# Patient Record
Sex: FEMALE | Race: WHITE | NOT HISPANIC OR LATINO | Employment: OTHER | URBAN - METROPOLITAN AREA
[De-identification: names, ages, dates, MRNs, and addresses within clinical notes are randomized per-mention and may not be internally consistent; named-entity substitution may affect disease eponyms.]

---

## 2017-01-09 ENCOUNTER — ALLSCRIPTS OFFICE VISIT (OUTPATIENT)
Dept: OTHER | Facility: OTHER | Age: 77
End: 2017-01-09

## 2017-04-17 ENCOUNTER — ALLSCRIPTS OFFICE VISIT (OUTPATIENT)
Dept: OTHER | Facility: OTHER | Age: 77
End: 2017-04-17

## 2017-05-08 ENCOUNTER — LAB REQUISITION (OUTPATIENT)
Dept: LAB | Facility: HOSPITAL | Age: 77
End: 2017-05-08
Payer: MEDICARE

## 2017-05-08 ENCOUNTER — ALLSCRIPTS OFFICE VISIT (OUTPATIENT)
Dept: OTHER | Facility: OTHER | Age: 77
End: 2017-05-08

## 2017-05-08 DIAGNOSIS — R30.0 DYSURIA: ICD-10-CM

## 2017-05-08 LAB
BILIRUB UR QL STRIP: NORMAL
CLARITY UR: NORMAL
COLOR UR: YELLOW
GLUCOSE (HISTORICAL): NORMAL
HGB UR QL STRIP.AUTO: 50
KETONES UR STRIP-MCNC: NORMAL MG/DL
LEUKOCYTE ESTERASE UR QL STRIP: 500
NITRITE UR QL STRIP: NORMAL
PH UR STRIP.AUTO: 5 [PH]
PROT UR STRIP-MCNC: NORMAL MG/DL
SP GR UR STRIP.AUTO: 1.02
UROBILINOGEN UR QL STRIP.AUTO: NORMAL

## 2017-05-08 PROCEDURE — 87186 SC STD MICRODIL/AGAR DIL: CPT | Performed by: FAMILY MEDICINE

## 2017-05-08 PROCEDURE — 87086 URINE CULTURE/COLONY COUNT: CPT | Performed by: FAMILY MEDICINE

## 2017-05-08 PROCEDURE — 87077 CULTURE AEROBIC IDENTIFY: CPT | Performed by: FAMILY MEDICINE

## 2017-05-10 LAB — BACTERIA UR CULT: NORMAL

## 2017-05-18 ENCOUNTER — GENERIC CONVERSION - ENCOUNTER (OUTPATIENT)
Dept: OTHER | Facility: OTHER | Age: 77
End: 2017-05-18

## 2017-07-24 ENCOUNTER — TRANSCRIBE ORDERS (OUTPATIENT)
Dept: ADMINISTRATIVE | Facility: HOSPITAL | Age: 77
End: 2017-07-24

## 2017-07-24 DIAGNOSIS — Z12.31 ENCOUNTER FOR MAMMOGRAM TO ESTABLISH BASELINE MAMMOGRAM: Primary | ICD-10-CM

## 2017-07-31 ENCOUNTER — HOSPITAL ENCOUNTER (OUTPATIENT)
Dept: RADIOLOGY | Facility: HOSPITAL | Age: 77
Discharge: HOME | End: 2017-07-31
Attending: OBSTETRICS & GYNECOLOGY
Payer: MEDICARE

## 2017-07-31 DIAGNOSIS — Z12.31 ENCOUNTER FOR MAMMOGRAM TO ESTABLISH BASELINE MAMMOGRAM: ICD-10-CM

## 2017-07-31 PROCEDURE — G0202 SCR MAMMO BI INCL CAD: HCPCS

## 2017-07-31 PROCEDURE — 77063 BREAST TOMOSYNTHESIS BI: CPT

## 2017-08-18 ENCOUNTER — TRANSCRIBE ORDERS (OUTPATIENT)
Dept: ADMINISTRATIVE | Facility: HOSPITAL | Age: 77
End: 2017-08-18

## 2017-08-18 DIAGNOSIS — R10.2 PELVIC PAIN IN FEMALE: Primary | ICD-10-CM

## 2017-08-28 ENCOUNTER — HOSPITAL ENCOUNTER (OUTPATIENT)
Dept: RADIOLOGY | Facility: HOSPITAL | Age: 77
Discharge: HOME | End: 2017-08-28
Attending: OBSTETRICS & GYNECOLOGY
Payer: MEDICARE

## 2017-08-28 DIAGNOSIS — R10.2 PELVIC PAIN IN FEMALE: ICD-10-CM

## 2017-08-28 PROCEDURE — 76830 TRANSVAGINAL US NON-OB: CPT

## 2017-08-28 PROCEDURE — 76856 US EXAM PELVIC COMPLETE: CPT

## 2017-10-16 ENCOUNTER — GENERIC CONVERSION - ENCOUNTER (OUTPATIENT)
Dept: OTHER | Facility: OTHER | Age: 77
End: 2017-10-16

## 2017-10-16 DIAGNOSIS — R06.09 OTHER FORMS OF DYSPNEA: ICD-10-CM

## 2017-10-16 DIAGNOSIS — R00.2 PALPITATIONS: ICD-10-CM

## 2017-10-16 DIAGNOSIS — R10.30 LOWER ABDOMINAL PAIN: ICD-10-CM

## 2017-10-18 ENCOUNTER — GENERIC CONVERSION - ENCOUNTER (OUTPATIENT)
Dept: OTHER | Facility: OTHER | Age: 77
End: 2017-10-18

## 2017-10-18 ENCOUNTER — ALLSCRIPTS OFFICE VISIT (OUTPATIENT)
Dept: OTHER | Facility: OTHER | Age: 77
End: 2017-10-18

## 2017-10-18 ENCOUNTER — TRANSCRIBE ORDERS (OUTPATIENT)
Dept: ADMINISTRATIVE | Facility: HOSPITAL | Age: 77
End: 2017-10-18

## 2017-10-18 ENCOUNTER — HOSPITAL ENCOUNTER (OUTPATIENT)
Dept: RADIOLOGY | Facility: HOSPITAL | Age: 77
Discharge: HOME | End: 2017-10-18
Attending: FAMILY MEDICINE
Payer: MEDICARE

## 2017-10-18 DIAGNOSIS — G47.19 TRANSIENT DISORDER OF INITIATING OR MAINTAINING WAKEFULNESS: Primary | ICD-10-CM

## 2017-10-18 DIAGNOSIS — R10.30 LOWER ABDOMINAL PAIN: ICD-10-CM

## 2017-10-18 DIAGNOSIS — R06.00 DOE (DYSPNEA ON EXERTION): ICD-10-CM

## 2017-10-18 PROCEDURE — 73502 X-RAY EXAM HIP UNI 2-3 VIEWS: CPT

## 2017-10-20 ENCOUNTER — GENERIC CONVERSION - ENCOUNTER (OUTPATIENT)
Dept: OTHER | Facility: OTHER | Age: 77
End: 2017-10-20

## 2017-10-24 ENCOUNTER — HOSPITAL ENCOUNTER (OUTPATIENT)
Dept: NON INVASIVE DIAGNOSTICS | Facility: HOSPITAL | Age: 77
Discharge: HOME | End: 2017-10-24
Payer: MEDICARE

## 2017-10-24 DIAGNOSIS — R06.00 DOE (DYSPNEA ON EXERTION): ICD-10-CM

## 2017-10-24 PROCEDURE — 93306 TTE W/DOPPLER COMPLETE: CPT

## 2017-10-26 ENCOUNTER — ALLSCRIPTS OFFICE VISIT (OUTPATIENT)
Dept: OTHER | Facility: OTHER | Age: 77
End: 2017-10-26

## 2017-10-27 ENCOUNTER — HOSPITAL ENCOUNTER (OUTPATIENT)
Dept: SLEEP CENTER | Facility: CLINIC | Age: 77
Discharge: HOME | End: 2017-10-27
Payer: MEDICARE

## 2017-10-27 DIAGNOSIS — G47.19 TRANSIENT DISORDER OF INITIATING OR MAINTAINING WAKEFULNESS: ICD-10-CM

## 2017-10-27 PROCEDURE — 95810 POLYSOM 6/> YRS 4/> PARAM: CPT

## 2017-10-28 ENCOUNTER — GENERIC CONVERSION - ENCOUNTER (OUTPATIENT)
Dept: OTHER | Facility: OTHER | Age: 77
End: 2017-10-28

## 2017-10-28 NOTE — CONSULTS
Assessment  1  Dyspnea on exertion (786 09) (R06 09)   2  Palpitations (785 1) (R00 2)   3  Bilateral leg edema (782 3) (R60 0)   4  Nausea (787 02) (R11 0)   5  Benign essential hypertension (401 1) (I10)    Plan  Palpitations    · HOLTER MONITOR - 48 HOUR; Status:Active; Requested VLL:[id number];    Perform:[facility]; NBP:08TUK2461; Last Updated By:Arlet Wiseman; 10/26/2017 2:52:34 PM;Ordered; For:Palpitations; Ordered By:Yo Saab;   · STRESS TEST ONLY, EXERCISE; Status:Active; Requested UEZ:[id number];    Perform:[facility]; YZL:[id number]; Last Updated By:Arlet Wiseman; 10/26/2017 2:52:34 PM;Ordered; For:Palpitations; Ordered By:Yo Saab;    Discussion/Summary    Dyspnea on exertion- unclear etiology  grade 1 diastolic dysfunction? normal EF  Normal PFT  possible component from deconditioning? uncontrolled bp with exertion?exercise treadmill stress  circadium rhythm/fatiguesleep study pending  holter monitor  The patient was counseled regarding diagnostic results,-- instructions for management,-- prognosis,-- patient and family education,-- risks and benefits of treatment options,-- importance of compliance with treatment  Chief Complaint  NP - echo done 10/24/17; breathing problems at night, left-sided chest heaviness  ylm/ma      History of Present Illness  Cardiology HPI Free Text Note Form [facility]: 68 yo with no prior cardiac hx with worsening shortness of breath and fluttering in her chest  Feels severe daytime fatigue  Previously could walk 2 blocks without difficulty  She now has to stop frequently  Some swelling in her legs at the end of day  Has significant arthritis  No recent fevers or chills  No syncope  HxDoctor- salon+ chemical exposuresmokingsignifcant drinkingfatigue + snoring  HxMI- latel 60s      Review of Systems      Cardiac: No complaints of chest pain, no palpitations, no fainting  Skin: No complaints of nonhealing sores or skin rash     Genitourinary: No complaints of recurrent urinary tract infections, frequent urination at night, difficult urination, blood in urine, kidney stones, loss of bladder control, kidney problems, denies any birth control or hormone replacement, is not post menopausal, not currently pregnant  Psychological: No complaints of feeling depressed, anxiety, panic attacks, or difficulty concentrating  General: No complaints of trouble sleeping, lack of energy, fatigue, appetite changes, weight changes, fever, frequent infections, or night sweats  Respiratory: shortness of breath, but-- No complaints of shortness of breath, cough with sputum, or wheezing  HEENT: No complaints of serious problems, hearing problems, nose problems, throat problems, or snoring  Gastrointestinal: No complaints of liver problems, nausea, vomiting, heartburn, constipation, bloody stools, diarrhea, problems swallowing, adbominal pain, or rectal bleeding  Hematologic: No complaints of bleeding disorders, anemia, blood clots, or excessive brusing  Neurological: No complaints of numbness, tingling, dizziness, weakness, seizures, headaches, syncope or fainting, AM fatigue, daytime sleepiness, no witnessed apnea episodes  Musculoskeletal: No complaints of arthritis, back pain, or painfull swelling  ROS reviewed  Active Problems  1  Abrasion On The Chest (911 0)   2  Acute bronchitis with bronchospasm (466 0) (J20 9)   3  Acute sinusitis (461 9) (J01 90)   4  Acute upper respiratory infection (465 9) (J06 9)   5  Allergic rhinitis (477 9) (J30 9)   6  Arm bruise (923 9) (S40 029A)   7  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)   8  Benign essential hypertension (401 1) (I10)   9  Bilateral leg edema (782 3) (R60 0)   10  BMI 35 0-35 9,adult (V85 35) (Z68 35)   11  BMI 36 0-36 9,adult (V85 36) (Z68 36)   12  Cataract (366 9) (H26 9)   13  Colon, diverticulosis (562 10) (K57 30)   14  Colonoscopy (Fiberoptic) Screening   15   Costochondritis (733 6) (M94 0) 16  Cough (786 2) (R05)   17  Daytime hypersomnolence (780 54) (G47 19)   18  Depression screen (V79 0) (Z13 89)   19  Diarrhea (787 91) (R19 7)   20  Dyspnea on exertion (786 09) (R06 09)   21  Dysuria (788 1) (R30 0)   22  Exposure to herpes (V01 79) (Z20 828)   23  Fear Of Possible Disease (V65 5)   24  Feeling weak (780 79) (R53 1)   25  Foreign body granuloma of skin and subcutaneous tissue (709 4) (L92 3)   26  Fracture of humerus (812 20) (S42 309A)   27  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) (M15 9)   28  Influenza vaccination declined (V64 06) (Z28 21)   29  Left buttock pain (729 1) (M79 1)   30  Nausea (787 02) (R11 0)   31  Overweight (278 02) (E66 3)   32  Preop examination (V72 84) (Z01 818)   33  Right foot pain (729 5) (M79 671)   34  Right-sided chest wall pain (786 52) (R07 89)   35  Screening for cardiovascular condition (V81 2) (Z13 6)   36  Screening for diabetes mellitus (V77 1) (Z13 1)   37  Screening for genitourinary condition (V81 6) (Z13 89)   38  Screening for thyroid disorder (V77 0) (Z13 29)   39  Severe lt groin pain (789 09) (R10 30)   40  Sleep disorder breathing (780 59) (G47 30)   41  Upper back pain on right side (724 5) (M54 9)   42  Urgency of urination (788 63) (R39 15)   43  Urinary tract infection (599 0) (N39 0)   44  UTI (urinary tract infection) (599 0) (N39 0)   45   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 (454 2) (I83 229,I83 219)    Past Medical History   · Acute bronchitis (466 0) (J20 9)   · History of Anxiety (300 00) (F41 9)   · History of Bleeding from varicose vein (454 8) (I83 899)   · History of Environmental allergies (V15 09) (Z91 09)   · History of abdominal pain (V13 89) (Z43 082)   · History of fracture of radius (V15 51) (Z87 81)   · History of obesity (V13 89) (Z86 39)   · History of scoliosis (V13 59) (Z87 39)   · History of Normal ECG   · History of Rash (782 1) (R21)   · History of Screening for genitourinary condition (V81 6) (Z13 89)   · History of Vaginitis and vulvovaginitis (616 10) (N76 0)    The active problems and past medical history were reviewed and updated today  Surgical History   · History of Cataract Surgery    The surgical history was reviewed and updated today  Family History  Mother    · No pertinent family history  Family History Reviewed: The family history was reviewed and updated today  Social History   · Being A Social Drinker   · Never A Smoker  The social history was reviewed and updated today  Current Meds   1  Aleve 220 MG Oral Tablet; take 1 tablet as needed; Therapy: (Recorded:08Jun2015) to Recorded   2  B Complex Vitamins CAPS; TAKE 1 CAPSULE Daily Recorded   3  Ciprofloxacin HCl - 500 MG Oral Tablet; TAKE 1 TABLET EVERY 12 HOURS DAILY; Therapy: 41FYY4180 to (Evaluate:19Tpe3860)  Requested for: 96DOA2597; Last   OQ:03NNV9707 Ordered   4  Fluticasone Propionate 50 MCG/ACT Nasal Suspension; USE 2 SPRAYS IN EACH   NOSTRIL DAILY  Requested for: 03XDH1124; Last Rx:74Kzp1155 Ordered   5  Nystatin-Triamcinolone 893884-1 0 UNIT/GM-% External Cream;   Therapy: (Recorded:26Sep2016) to Recorded   6  ZyrTEC Allergy 10 MG Oral Capsule; 1 Cap daily Recorded    The medication list was reviewed and updated today  Allergies  1  Penicillins   2  Sulfa Drugs    Vitals  Signs   Heart Rate: 68, L Radial  Systolic: 796, LUE, Sitting  Diastolic: 72, LUE, Sitting  Height: 5 ft 1 in  Weight: 206 lb   BMI Calculated: 38 92  BSA Calculated: 1 91  O2 Saturation: 99, RA    Physical Exam    Constitutional   General appearance: No acute distress, well appearing and well nourished  Eyes   Conjunctiva and Sclera examination: Conjunctiva pink, sclera anicteric  Ears, Nose, Mouth, and Throat - Oropharynx: Abnormal    Neck   Neck and thyroid: Normal, supple, trachea midline, no thyromegaly  Pulmonary   Respiratory effort: No increased work of breathing or signs of respiratory distress  Auscultation of lungs: Clear to auscultation, no rales, no rhonchi, no wheezing, good air movement  Cardiovascular rrr +s1 +s2 no murmur  Carotid pulses: Normal, 2+ bilaterally  Peripheral vascular exam: Normal pulses throughout, no tenderness, erythema or swelling  Pedal pulses: Normal, 2+ bilaterally  Examination of extremities for edema and/or varicosities: Abnormal     Abdomen   Abdomen: Non-tender and no distention  Liver and spleen: No hepatomegaly or splenomegaly  Musculoskeletal Gait and station: Normal gait  -- Digits and nails: Normal without clubbing or cyanosis  -- Inspection/palpation of joints, bones, and muscles: Normal, ROM normal     Skin - Skin and subcutaneous tissue: Normal without rashes or lesions  Skin is warm and well perfused, normal turgor  Neurologic - Cranial nerves: II - XII intact  -- Speech: Normal     Psychiatric - Orientation to person, place, and time: Normal -- Mood and affect: Normal       Results/Data  I personally reviewed the recording/images in the office today  My interpretation follows  normal sinus rhythm no acute st/t wave changes      Future Appointments    Date/Time Provider Specialty Site   01/22/2018 01:00 PM ORVILLE Nicole  [address] [place] [address]   12/04/2017 11:40 AM ORVILLE Harris  Cardiology [facility] CARDIOLOGY [facility]     End of Encounter Meds  1  Fluticasone Propionate 50 MCG/ACT Nasal Suspension; USE 2 SPRAYS IN EACH   NOSTRIL DAILY  Requested for: 99DSQ4554; Last Rx:29Lpm8788 Ordered  2  Ciprofloxacin HCl - 500 MG Oral Tablet; TAKE 1 TABLET EVERY 12 HOURS DAILY; Therapy: 51ABT9729 to (Evaluate:81Glr1720)  Requested for: 43NZT4721; Last   JW:[id number] Ordered  3  Aleve 220 MG Oral Tablet; take 1 tablet as needed; Therapy: (Recorded:08Jun2015) to Recorded   4  B Complex Vitamins CAPS; TAKE 1 CAPSULE Daily Recorded   5   Nystatin-Triamcinolone 287238-0 1 UNIT/GM-% External Cream;   Therapy: (Recorded:86Buq2363) to Recorded   6   ZyrTEC Allergy 10 MG Oral Capsule; 1 Cap daily Recorded    Signatures   Electronically signed by : ORVILLE Sue ; Oct 27 2017 10:06AM EST                       (Author)

## 2017-11-06 ENCOUNTER — HOSPITAL ENCOUNTER (OUTPATIENT)
Dept: NON INVASIVE DIAGNOSTICS | Facility: HOSPITAL | Age: 77
Discharge: HOME | End: 2017-11-06
Attending: INTERNAL MEDICINE
Payer: MEDICARE

## 2017-11-06 ENCOUNTER — GENERIC CONVERSION - ENCOUNTER (OUTPATIENT)
Dept: OTHER | Facility: OTHER | Age: 77
End: 2017-11-06

## 2017-11-06 DIAGNOSIS — R00.2 PALPITATIONS: ICD-10-CM

## 2017-11-06 PROCEDURE — 93226 XTRNL ECG REC<48 HR SCAN A/R: CPT

## 2017-11-06 PROCEDURE — 93225 XTRNL ECG REC<48 HRS REC: CPT

## 2017-11-06 PROCEDURE — 93017 CV STRESS TEST TRACING ONLY: CPT

## 2017-11-06 RX ORDER — COVID-19 ANTIGEN TEST
1 KIT MISCELLANEOUS DAILY
COMMUNITY

## 2017-11-08 ENCOUNTER — TRANSCRIBE ORDERS (OUTPATIENT)
Dept: ADMINISTRATIVE | Facility: HOSPITAL | Age: 77
End: 2017-11-08

## 2017-11-08 DIAGNOSIS — G47.33 OBSTRUCTIVE SLEEP APNEA SYNDROME: Primary | ICD-10-CM

## 2017-11-16 ENCOUNTER — HOSPITAL ENCOUNTER (OUTPATIENT)
Dept: SLEEP CENTER | Facility: CLINIC | Age: 77
Discharge: HOME | End: 2017-11-16
Payer: MEDICARE

## 2017-11-16 DIAGNOSIS — G47.33 OBSTRUCTIVE SLEEP APNEA SYNDROME: ICD-10-CM

## 2017-11-16 PROCEDURE — 95811 POLYSOM 6/>YRS CPAP 4/> PARM: CPT

## 2017-11-17 LAB
CHEST PAIN STATEMENT: NORMAL
MAX DIASTOLIC BP: 82 MMHG
MAX HEART RATE: 150 BPM
MAX PREDICTED HEART RATE: 144 BPM
MAX. SYSTOLIC BP: 200 MMHG
PROTOCOL NAME: NORMAL
TARGET HR FORMULA: NORMAL
TEST INDICATION: NORMAL
TIME IN EXERCISE PHASE: NORMAL

## 2017-12-01 ENCOUNTER — GENERIC CONVERSION - ENCOUNTER (OUTPATIENT)
Dept: OTHER | Facility: OTHER | Age: 77
End: 2017-12-01

## 2017-12-04 ENCOUNTER — ALLSCRIPTS OFFICE VISIT (OUTPATIENT)
Dept: OTHER | Facility: OTHER | Age: 77
End: 2017-12-04

## 2017-12-04 ENCOUNTER — TRANSCRIBE ORDERS (OUTPATIENT)
Dept: ADMINISTRATIVE | Facility: HOSPITAL | Age: 77
End: 2017-12-04

## 2017-12-04 DIAGNOSIS — R94.39 ABNORMAL STRESS TEST: Primary | ICD-10-CM

## 2017-12-04 DIAGNOSIS — I65.23 OCCLUSION AND STENOSIS OF BILATERAL CAROTID ARTERIES: ICD-10-CM

## 2017-12-06 NOTE — PROGRESS NOTES
Assessment  Assessed    1  Dyspnea on exertion (786 09) (R06 09)   2  Bilateral carotid artery stenosis (433 10,433 30) (I65 23)   3  Abnormal stress ECG with treadmill (794 39) (R94 39)    Plan  Abnormal stress ECG with treadmill, Dyspnea on exertion, Right-sided chest wall pain    · * NM MYOCARDIAL PERFUSION SPECT (STRESS AND/OR REST); Status:NeedInformation - Financial Authorization; Requested for:74Rxk4564;    Perform: [facility]; VSL:66PSF1574; Last Updated By:Brenda Marquez; 12/4/2017 2:52:46 PM;Ordered;stress ECG with treadmill, Dyspnea on exertion, Right-sided chest wall pain; Ordered By:Yo Saab;  Bilateral carotid artery stenosis    · VAS CAROTID COMPLETE STUDY; SIDE:Bilateral; Status:Active; Requestedfor:18Mwa8045;    Perform:[facility]; ALEX:45WVN5307; Last Updated By:Bradley Marquez; 12/4/2017 2:52:46 PM;Ordered;carotid artery stenosis; Ordered By:Yo Saab;    Discussion/Summary  Cardiology Discussion Summary Free Text Note Form [facility]:   Dyspnea on exertion- unclear etiology  grade 1 diastolic dysfunction? normal EF  Normal PFT  possible component from deconditioning? uncontrolled bp with exertion?lexiscan nuclear stress  circadium rhythm/fatiguesleep study pending  holter monitor  artery diseasecarotid u/s  Counseling Documentation With Imm: The patient was counseled regarding diagnostic results,-- instructions for management,-- prognosis,-- patient and family education,-- risks and benefits of treatment options,-- importance of compliance with treatment  Chief Complaint  Chief Complaint Free Text Note Form: PT is here for f/u after stress test  No cardiac complaint at the time of visit  History of Present Illness  Cardiology HPI Free Text Note Form [facility]: 68 yo with no prior cardiac hx with worsening shortness of breath and fluttering in her chest  Feels severe daytime fatigue  Previously could walk 2 blocks without difficulty   She now has to stop frequently  Some swelling in her legs at the end of day  Has significant arthritis  No recent fevers or chills  No syncope  She was unable to reach target on the exercise treadmill  She was also noted to have elevated blood pressure  She does not exercise frequently  She denies having any dizziness or lightheadedness  We reviewed through her studies and blood work  HxDoctor- salon+ chemical exposuresmokingsignifcant drinkingfatigue + snoring  HxMI- latel 60s      Review of Systems  Cardiology Female ROS:    Cardiac: No complaints of chest pain, no palpitations, no fainting  Skin: No complaints of nonhealing sores or skin rash  Genitourinary: No complaints of recurrent urinary tract infections, frequent urination at night, difficult urination, blood in urine, kidney stones, loss of bladder control, kidney problems, denies any birth control or hormone replacement, is not post menopausal, not currently pregnant  Psychological: No complaints of feeling depressed, anxiety, panic attacks, or difficulty concentrating  General: No complaints of trouble sleeping, lack of energy, fatigue, appetite changes, weight changes, fever, frequent infections, or night sweats  Respiratory: shortness of breath, but-- No complaints of shortness of breath, cough with sputum, or wheezing  HEENT: No complaints of serious problems, hearing problems, nose problems, throat problems, or snoring  Gastrointestinal: No complaints of liver problems, nausea, vomiting, heartburn, constipation, bloody stools, diarrhea, problems swallowing, adbominal pain, or rectal bleeding  Hematologic: No complaints of bleeding disorders, anemia, blood clots, or excessive brusing  Neurological: No complaints of numbness, tingling, dizziness, weakness, seizures, headaches, syncope or fainting, AM fatigue, daytime sleepiness, no witnessed apnea episodes  Musculoskeletal: No complaints of arthritis, back pain, or painfull swelling     ROS Reviewed: ROS reviewed  Active Problems  Problems    1  Abrasion On The Chest (911 0)   2  Acute bronchitis with bronchospasm (466 0) (J20 9)   3  Acute sinusitis (461 9) (J01 90)   4  Acute upper respiratory infection (465 9) (J06 9)   5  Allergic rhinitis (477 9) (J30 9)   6  Arm bruise (923 9) (S40 029A)   7  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)   8  Benign essential hypertension (401 1) (I10)   9  Bilateral leg edema (782 3) (R60 0)   10  BMI 35 0-35 9,adult (V85 35) (Z68 35)   11  BMI 36 0-36 9,adult (V85 36) (Z68 36)   12  Cataract (366 9) (H26 9)   13  Colon, diverticulosis (562 10) (K57 30)   14  Colonoscopy (Fiberoptic) Screening   15  Costochondritis (733 6) (M94 0)   16  Cough (786 2) (R05)   17  Daytime hypersomnolence (780 54) (G47 19)   18  Depression screen (V79 0) (Z13 89)   19  Diarrhea (787 91) (R19 7)   20  Dyspnea on exertion (786 09) (R06 09)   21  Dysuria (788 1) (R30 0)   22  Exposure to herpes (V01 79) (Z20 828)   23  Fear Of Possible Disease (V65 5)   24  Feeling weak (780 79) (R53 1)   25  Foreign body granuloma of skin and subcutaneous tissue (709 4) (L92 3)   26  Fracture of humerus (812 20) (S42 309A)   27  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) (M15 9)   28  Influenza vaccination declined (V64 06) (Z28 21)   29  Left buttock pain (729 1) (M79 1)   30  Nausea (787 02) (R11 0)   31  Obstructive sleep apnea (327 23) (G47 33)   32  Overweight (278 02) (E66 3)   33  Palpitations (785 1) (R00 2)   34  Preop examination (V72 84) (Z01 818)   35  Right foot pain (729 5) (M79 671)   36  Right-sided chest wall pain (786 52) (R07 89)   37  Screening for cardiovascular condition (V81 2) (Z13 6)   38  Screening for diabetes mellitus (V77 1) (Z13 1)   39  Screening for genitourinary condition (V81 6) (Z13 89)   40  Screening for thyroid disorder (V77 0) (Z13 29)   41  Severe lt groin pain (789 09) (R10 30)   42   Sleep disorder breathing (780 59) (G47 30)   43  Upper back pain on right side (724 5) (M54 9)   44  Urgency of urination (788 63) (R39 15)   45  Urinary tract infection (599 0) (N39 0)   46  UTI (urinary tract infection) (599 0) (N39 0)   47  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 (454 2) (I83 229,I83 219)    Past Medical History  Problems    1  Acute bronchitis (466 0) (J20 9)   2  History of Anxiety (300 00) (F41 9)   3  History of Bleeding from varicose vein (454 8) (I83 899)   4  History of Environmental allergies (V15 09) (Z91 09)   5  History of abdominal pain (V13 89) (Z87 898)   6  History of fracture of radius (V15 51) (Z87 81)   7  History of obesity (V12 29) (Z86 39)   8  History of scoliosis (V13 59) (Z87 39)   9  History of Normal ECG   10  History of Rash (782 1) (R21)   11  History of Screening for genitourinary condition (V81 6) (Z13 89)   12  History of Vaginitis and vulvovaginitis (616 10) (N76 0)  Active Problems And Past Medical History Reviewed: The active problems and past medical history were reviewed and updated today  Surgical History  Problems    1  History of Cataract Surgery  Surgical History Reviewed: The surgical history was reviewed and updated today  Family History  Mother    1  No pertinent family history  Family History Reviewed: The family history was reviewed and updated today  Social History  Problems    · Being A Social Drinker   · Never A Smoker  Social History Reviewed: The social history was reviewed and updated today  Current Meds   1  Aleve 220 MG Oral Tablet; take 1 tablet as needed; Therapy: (Recorded:08Jun2015) to Recorded   2  B Complex Vitamins CAPS; TAKE 1 CAPSULE Daily Recorded   3  Fluticasone Propionate 50 MCG/ACT Nasal Suspension; USE 2 SPRAYS IN EACH NOSTRIL DAILY  Requested for: 10SPI2286; Last Rx:26Sep2016 Ordered   4  Nystatin-Triamcinolone 531240-3 3 UNIT/GM-% External Cream; Therapy: (Recorded:26Sep2016) to Recorded   5   ZyrTEC Allergy 10 MG Oral Capsule; 1 Cap daily Recorded  Medication List Reviewed: The medication list was reviewed and updated today  Allergies  Medication    1  Penicillins   2  Sulfa Drugs    Vitals  Vital Signs    Recorded: 42ZFB9625 11:47AM   Heart Rate 63, Apical   Systolic 049, LUE, Sitting   Diastolic 78, LUE, Sitting   Height 5 ft 1 in   Weight 204 lb    BMI Calculated 38 55   BSA Calculated 1 91   O2 Saturation 98       Physical Exam   Constitutional  General appearance: No acute distress, well appearing and well nourished  Eyes  Conjunctiva and Sclera examination: Conjunctiva pink, sclera anicteric  Ears, Nose, Mouth, and Throat - Oropharynx: Abnormal   Neck  Neck and thyroid: Normal, supple, trachea midline, no thyromegaly  Pulmonary  Respiratory effort: No increased work of breathing or signs of respiratory distress  Auscultation of lungs: Clear to auscultation, no rales, no rhonchi, no wheezing, good air movement  Cardiovascular rrr +s1 +s2 no murmur  Carotid pulses: Normal, 2+ bilaterally  Peripheral vascular exam: Normal pulses throughout, no tenderness, erythema or swelling  Pedal pulses: Normal, 2+ bilaterally  Examination of extremities for edema and/or varicosities: Abnormal    Abdomen  Abdomen: Non-tender and no distention  Liver and spleen: No hepatomegaly or splenomegaly  Musculoskeletal Gait and station: Normal gait  -- Digits and nails: Normal without clubbing or cyanosis  -- Inspection/palpation of joints, bones, and muscles: Normal, ROM normal    Skin - Skin and subcutaneous tissue: Normal without rashes or lesions  Skin is warm and well perfused, normal turgor  Neurologic - Cranial nerves: II - XII intact  -- Speech: Normal    Psychiatric - Orientation to person, place, and time: Normal -- Mood and affect: Normal       Results/Data  Diagnostic Studies Reviewed Cardio: I personally reviewed the recording/images in the office today  My interpretation follows     ECG Report: normal sinus rhythm no acute st/t wave changes      Health Management  Moeller esophagus   COLONOSCOPY; every 5 years; Last 28Apr2014; Next Due: 14VQB5049; Active    Future Appointments    Date/Time Provider Specialty Site   01/22/2018 01:00 PM ORVILLE Vidales  [address] [place] [address]   01/15/2018 08:00 AM ORVILLE Scott  Cardiology [facility] CARDIOLOGY [facility]   01/19/2018 08:15 AM ORVILLE Horton   Pulmonary Medicine [facility] [address]       Signatures   Electronically signed by : ORVILLE Jj ; Dec  4 2017  5:08PM EST                       (Author)

## 2017-12-27 ENCOUNTER — HOSPITAL ENCOUNTER (OUTPATIENT)
Dept: RADIOLOGY | Facility: HOSPITAL | Age: 77
Discharge: HOME | End: 2017-12-27
Attending: INTERNAL MEDICINE
Payer: MEDICARE

## 2017-12-27 ENCOUNTER — HOSPITAL ENCOUNTER (OUTPATIENT)
Dept: NON INVASIVE DIAGNOSTICS | Facility: HOSPITAL | Age: 77
Discharge: HOME | End: 2017-12-27
Attending: INTERNAL MEDICINE
Payer: MEDICARE

## 2017-12-27 ENCOUNTER — GENERIC CONVERSION - ENCOUNTER (OUTPATIENT)
Dept: CARDIOLOGY CLINIC | Facility: CLINIC | Age: 77
End: 2017-12-27

## 2017-12-27 ENCOUNTER — GENERIC CONVERSION - ENCOUNTER (OUTPATIENT)
Dept: OTHER | Facility: OTHER | Age: 77
End: 2017-12-27

## 2017-12-27 DIAGNOSIS — I65.23 OCCLUSION AND STENOSIS OF BILATERAL CAROTID ARTERIES: ICD-10-CM

## 2017-12-27 DIAGNOSIS — R94.39 ABNORMAL STRESS TEST: ICD-10-CM

## 2017-12-27 PROCEDURE — A9502 TC99M TETROFOSMIN: HCPCS

## 2017-12-27 PROCEDURE — 93017 CV STRESS TEST TRACING ONLY: CPT

## 2017-12-27 PROCEDURE — 93880 EXTRACRANIAL BILAT STUDY: CPT

## 2017-12-27 PROCEDURE — 78452 HT MUSCLE IMAGE SPECT MULT: CPT

## 2017-12-28 LAB
CHEST PAIN STATEMENT: NORMAL
MAX DIASTOLIC BP: 70 MMHG
MAX HEART RATE: 146 BPM
MAX PREDICTED HEART RATE: 144 BPM
MAX. SYSTOLIC BP: 164 MMHG
PROTOCOL NAME: NORMAL
REASON FOR TERMINATION: NORMAL
TARGET HR FORMULA: NORMAL
TIME IN EXERCISE PHASE: NORMAL

## 2018-01-12 VITALS
OXYGEN SATURATION: 97 % | SYSTOLIC BLOOD PRESSURE: 132 MMHG | WEIGHT: 203 LBS | TEMPERATURE: 98.3 F | HEART RATE: 88 BPM | BODY MASS INDEX: 35.97 KG/M2 | HEIGHT: 63 IN | DIASTOLIC BLOOD PRESSURE: 72 MMHG | RESPIRATION RATE: 18 BRPM

## 2018-01-13 VITALS
DIASTOLIC BLOOD PRESSURE: 72 MMHG | HEART RATE: 68 BPM | WEIGHT: 206 LBS | HEIGHT: 61 IN | BODY MASS INDEX: 38.89 KG/M2 | SYSTOLIC BLOOD PRESSURE: 138 MMHG | OXYGEN SATURATION: 99 %

## 2018-01-13 NOTE — PROCEDURES
Procedures by Luis Daniel Cuba MD at 2017  11:59 PM      Author:  Luis Daniel Cuba MD Service:  Cardiology Author Type:  Physician    Filed:  2017 11:11 PM Date of Service:  2017 11:59 PM Status:  Signed    :  Luis Daniel Cuba MD (Physician)         PT NAME: Santhosh Castellano  : 1940  AGE: 69 y o  GENDER: female  MRN: [id number]  PROCEDURE: Holter monitor - 48hour    DATE OF PROCEDURE:      INDICATIONS:   48 hour Holter monitor was performed for palpitations     PROCEDURE:    Average heart rate was 71; minimum heart rate was sinus bradycardia at 51 BPM at 1:45am; and maximum heart rate was sinus tachycardia at 114 BPM at 6:47am      Ventricular ectopic activity consisted of 8, which comprises 0% of total beats  Supraventricular ectopic activity consisted of 608, which comprises 0 3% of total beats  602 were single pac    No significant bradyarrhythmias were present  Patient's diary included symptoms of tired  Corresponding events on monitor was no significant symptoms  IMPRESSION:  1  Normal 48 hour holter tracing report  2  Rhythm was sinus throughout monitoring period  3  There were few pac episodes of Supraventricular ectopic activity  4  There were no episodes of Ventricular ectopic activity  5  Patient's symptoms on diary corresponded to normal sinus rhythm on monitor  Interpreted by: Luis Daniel Cuba MD, Casey RENE    2017 11:11PM [facility] Standard Time

## 2018-01-14 VITALS
TEMPERATURE: 97.2 F | HEIGHT: 63 IN | RESPIRATION RATE: 18 BRPM | OXYGEN SATURATION: 96 % | HEART RATE: 74 BPM | DIASTOLIC BLOOD PRESSURE: 72 MMHG | SYSTOLIC BLOOD PRESSURE: 132 MMHG | WEIGHT: 204 LBS | BODY MASS INDEX: 36.14 KG/M2

## 2018-01-14 VITALS
WEIGHT: 204 LBS | SYSTOLIC BLOOD PRESSURE: 128 MMHG | BODY MASS INDEX: 36.14 KG/M2 | HEART RATE: 68 BPM | HEIGHT: 63 IN | TEMPERATURE: 95.6 F | DIASTOLIC BLOOD PRESSURE: 78 MMHG | OXYGEN SATURATION: 96 % | RESPIRATION RATE: 18 BRPM

## 2018-01-14 NOTE — MISCELLANEOUS
Message  Message Free Text Note Form: diagnostic study reveals severe GEORGINA with hypoxemia  She will need titration study  I called and left message to patient to call back        Signatures   Electronically signed by : CHANELL Delgado; Nov 6 2017  9:38AM EST                       (Author)

## 2018-01-15 ENCOUNTER — ALLSCRIPTS OFFICE VISIT (OUTPATIENT)
Dept: OTHER | Facility: OTHER | Age: 78
End: 2018-01-15

## 2018-01-15 NOTE — MISCELLANEOUS
Message  I called patient re xray showing mild deg changes will refer to surg to r/o hernia then f/u with ortho if they feel not a surgical issue instructions      Signatures   Electronically signed by : ORVILLE Villalobos ; Oct 18 2017  8:49AM EST                       (Author)

## 2018-01-16 NOTE — PROGRESS NOTES
Assessment   Assessed    1  Benign essential hypertension (401 1) (I10)   2  Obstructive sleep apnea (327 23) (G47 33)   3  Overweight (278 02) (E66 3)   4  Bilateral leg edema (782 3) (R60 0)   5  Carotid atherosclerosis, unspecified laterality (433 10) (I65 29)    Plan   Abnormal stress ECG with treadmill, Dyspnea on exertion    · EKG/ECG- POC; Status:Complete;   Done: [id number]   Perform: In Office; FHW:51LKA6714; Last Updated By:Muriel Ramos; 1/15/2018 8:25:33 AM;Ordered; For:Abnormal stress ECG with treadmill, Dyspnea on exertion; Ordered By:Yo Saab;  Benign essential hypertension    · (1) BASIC METABOLIC PROFILE; Status:Active; Requested for:01Roo7746; Perform:[facility]; OBD:[id number];ETBCFYY; For:Benign essential hypertension; Ordered By:Yo Saab;  Benign essential hypertension, Bilateral carotid artery stenosis    · Potassium Chloride ER 10 MEQ Oral Tablet Extended Release; take one tablet    daily in morning with hctz   Rx By: Ric Kimble; Dispense: 90 Days ; #:90 Tablet Extended Release; Refill: 1;For: Benign essential hypertension, Bilateral carotid artery stenosis; GOLD = N; Verified Transmission to [facility] ([place]; Last Updated By: System, SureScripts; 1/15/2018 8:40:30 AM  Bilateral leg edema    · HydroCHLOROthiazide 12 5 MG Oral Tablet; TAKE 1 TABLET DAILY IN THE    MORNING   Rx By: Ric Kimble; Dispense: 90 Days ; #:90 Tablet; Refill: 3;For: Bilateral leg edema; GOLD = N; Verified Transmission to [address] [facility] ([place]; Last Updated By: System, SureScripts; 1/15/2018 8:40:25 AM  Carotid atherosclerosis, unspecified laterality    · Aspirin 81 MG Oral Tablet Chewable;  Take one tablet daily   Rx By: Ric Kimble; Dispense: 90 Days ; #:90 Tablet Chewable; Refill: 0;For: Carotid atherosclerosis, unspecified laterality; GOLD = N; Verified Transmission to [address] [facility], [facility] within [facility] [facility]; Last Updated By: System, SureScripts; 1/15/2018 8:37:17 AM    Discussion/Summary   Cardiology Discussion Summary Free Text Note Form [facility]:    Dyspnea on exertion- unclear etiology  grade 1 diastolic dysfunction? normal EF  Normal PFT  possible component from deconditioning? uncontrolled bp with exertion? stress test with mild perfusion abnormality likely artifact hctz 12 5mg in am   circadium rhythm/fatigue cpap   holter monitor   artery disease aspirin 81mg  Counseling Documentation With Imm: The patient was counseled regarding diagnostic results,-- instructions for management,-- prognosis,-- patient and family education,-- risks and benefits of treatment options,-- importance of compliance with treatment  Chief Complaint   Chief Complaint Free Text Note Form: Patient is here to discuss Cardiac testing  C/o lightheadedness and some leg swelling   /cg      History of Present Illness   Cardiology HPI Free Text Note Form [facility]: 70 yo with no prior cardiac hx with worsening shortness of breath and fluttering in her chest  Feels severe daytime fatigue  Previously could walk 2 blocks without difficulty  She now has to stop frequently  Some swelling in her legs at the end of day  Has significant arthritis  No recent fevers or chills  No syncope  She was unable to reach target on the exercise treadmill  She was also noted to have elevated blood pressure  She does not exercise frequently  She denies having any dizziness or lightheadedness  We reviewed through her studies and blood work  We reviewed through her recent studies  Her  is present and states she is not walking much and is sedentary  She continues to have some lower extremity edema worse in her left leg  She denies having any dizziness or lightheadedness  She denies having any falls  She denies having any head trauma  She denies having any exertional chest tightness     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 smoking signifcant drinking fatigue + snoring   Hx MI- latel 60s      Review of Systems Cardiology Female ROS:         Cardiac: No complaints of chest pain, no palpitations, no fainting  Skin: No complaints of nonhealing sores or skin rash  Genitourinary: No complaints of recurrent urinary tract infections, frequent urination at night, difficult urination, blood in urine, kidney stones, loss of bladder control, kidney problems, denies any birth control or hormone replacement, is not post menopausal, not currently pregnant  Psychological: No complaints of feeling depressed, anxiety, panic attacks, or difficulty concentrating  General: No complaints of trouble sleeping, lack of energy, fatigue, appetite changes, weight changes, fever, frequent infections, or night sweats  Respiratory: shortness of breath, but-- No complaints of shortness of breath, cough with sputum, or wheezing  HEENT: No complaints of serious problems, hearing problems, nose problems, throat problems, or snoring  Gastrointestinal: No complaints of liver problems, nausea, vomiting, heartburn, constipation, bloody stools, diarrhea, problems swallowing, adbominal pain, or rectal bleeding  Hematologic: No complaints of bleeding disorders, anemia, blood clots, or excessive brusing  Neurological: No complaints of numbness, tingling, dizziness, weakness, seizures, headaches, syncope or fainting, AM fatigue, daytime sleepiness, no witnessed apnea episodes  Musculoskeletal: No complaints of arthritis, back pain, or painfull swelling  ROS Reviewed:    ROS reviewed  Active Problems   Problems    1  Abnormal stress ECG with treadmill (794 39) (R94 39)   2  Abrasion On The Chest (911 0)   3  Acute bronchitis with bronchospasm (466 0) (J20 9)   4  Acute sinusitis (461 9) (J01 90)   5  Acute upper respiratory infection (465 9) (J06 9)   6  Allergic rhinitis (477 9) (J30 9)   7  Arm bruise (923 9) (S40 029A)   8  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)   9   Benign essential hypertension (401 1) (I10)   10  Bilateral carotid artery stenosis (433 10,433 30) (I65 23)   11  Bilateral leg edema (782 3) (R60 0)   12  BMI 35 0-35 9,adult (V85 35) (Z68 35)   13  BMI 36 0-36 9,adult (V85 36) (Z68 36)   14  Cataract (366 9) (H26 9)   15  Colon, diverticulosis (562 10) (K57 30)   16  Colonoscopy (Fiberoptic) Screening   17  Costochondritis (733 6) (M94 0)   18  Cough (786 2) (R05)   19  Daytime hypersomnolence (780 54) (G47 19)   20  Depression screen (V79 0) (Z13 89)   21  Diarrhea (787 91) (R19 7)   22  Dyspnea on exertion (786 09) (R06 09)   23  Dysuria (788 1) (R30 0)   24  Exposure to herpes (V01 79) (Z20 828)   25  Fear Of Possible Disease (V65 5)   26  Feeling weak (780 79) (R53 1)   27  Foreign body granuloma of skin and subcutaneous tissue (709 4) (L92 3)   28  Fracture of humerus (812 20) (S42 309A)   29  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) (M15 9)   30  Influenza vaccination declined (V64 06) (Z28 21)   31  Left buttock pain (729 1) (M79 1)   32  Nausea (787 02) (R11 0)   33  Obstructive sleep apnea (327 23) (G47 33)   34  Overweight (278 02) (E66 3)   35  Palpitations (785 1) (R00 2)   36  Preop examination (V72 84) (Z01 818)   37  Right foot pain (729 5) (M79 671)   38  Right-sided chest wall pain (786 52) (R07 89)   39  Screening for cardiovascular condition (V81 2) (Z13 6)   40  Screening for diabetes mellitus (V77 1) (Z13 1)   41  Screening for genitourinary condition (V81 6) (Z13 89)   42  Screening for thyroid disorder (V77 0) (Z13 29)   43  Severe lt groin pain (789 09) (R10 30)   44  Sleep disorder breathing (780 59) (G47 30)   45  Upper back pain on right side (724 5) (M54 9)   46  Urgency of urination (788 63) (R39 15)   47  Urinary tract infection (599 0) (N39 0)   48  UTI (urinary tract infection) (599 0) (N39 0)   49  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 (454 2) (I83 229,I83 219)    Past Medical History   Problems    1   Acute bronchitis (466 0) (J20 9)   2  History of Anxiety (300 00) (F41 9)   3  History of Bleeding from varicose vein (454 8) (I83 899)   4  History of Environmental allergies (V15 09) (Z91 09)   5  History of abdominal pain (V13 89) (Z87 898)   6  History of fracture of radius (V15 51) (Z87 81)   7  History of obesity (V12 29) (Z86 39)   8  History of scoliosis (V13 59) (Z87 39)   9  History of Normal ECG   10  History of Rash (782 1) (R21)   11  History of Screening for genitourinary condition (V81 6) (Z13 89)   12  History of Vaginitis and vulvovaginitis (616 10) (N76 0)  Active Problems And Past Medical History Reviewed: The active problems and past medical history were reviewed and updated today  Surgical History   Problems    1  History of Cataract Surgery  Surgical History Reviewed: The surgical history was reviewed and updated today  Family History   Mother    1  No pertinent family history  Family History Reviewed: The family history was reviewed and updated today  Social History   Problems    · Being A Social Drinker   · Never A Smoker  Social History Reviewed: The social history was reviewed and updated today  Current Meds    1  Aleve 220 MG Oral Tablet; take 1 tablet as needed; Therapy: (Recorded:08Jun2015) to Recorded   2  B Complex Vitamins CAPS; TAKE 1 CAPSULE Daily Recorded   3  Fluticasone Propionate 50 MCG/ACT Nasal Suspension; USE 2 SPRAYS IN EACH     NOSTRIL DAILY  Requested for: 80MOP9711; Last Rx:02Jan2018 Ordered   4  Nystatin-Triamcinolone 912592-7 1 UNIT/GM-% External Cream;     Therapy: (Recorded:74Nzm9819) to Recorded   5  ZyrTEC Allergy 10 MG Oral Capsule; 1 Cap daily Recorded  Medication List Reviewed: The medication list was reviewed and updated today  Allergies   Medication    1  Penicillins   2   Sulfa Drugs    Vitals   Vital Signs    Recorded: 91MTQ2188 08:20AM   Heart Rate 69, Apical   Systolic 660, LUE, Sitting   Diastolic 76, LUE, Sitting   BP CUFF SIZE Large Height 5 ft 1 in   Weight 199 lb    BMI Calculated 37 6   BSA Calculated 1 89   O2 Saturation 96, RA     Physical Exam        Constitutional      General appearance: No acute distress, well appearing and well nourished  Eyes      Conjunctiva and Sclera examination: Conjunctiva pink, sclera anicteric  Ears, Nose, Mouth, and Throat - Oropharynx: Abnormal       Neck      Neck and thyroid: Normal, supple, trachea midline, no thyromegaly  Pulmonary      Respiratory effort: No increased work of breathing or signs of respiratory distress  Auscultation of lungs: Clear to auscultation, no rales, no rhonchi, no wheezing, good air movement  Cardiovascular rrr +s1 +s2 no murmur  Carotid pulses: Normal, 2+ bilaterally  Peripheral vascular exam: Normal pulses throughout, no tenderness, erythema or swelling  Pedal pulses: Normal, 2+ bilaterally  Examination of extremities for edema and/or varicosities: Abnormal        Abdomen      Abdomen: Non-tender and no distention  Liver and spleen: No hepatomegaly or splenomegaly  Musculoskeletal Gait and station: Normal gait  -- Digits and nails: Normal without clubbing or cyanosis  -- Inspection/palpation of joints, bones, and muscles: Normal, ROM normal        Skin - Skin and subcutaneous tissue: Normal without rashes or lesions  Skin is warm and well perfused, normal turgor  Neurologic - Cranial nerves: II - XII intact  -- Speech: Normal        Psychiatric - Orientation to person, place, and time: Normal -- Mood and affect: Normal       Results/Data   Diagnostic Studies Reviewed Cardio: I personally reviewed the recording/images in the office today  My interpretation follows  Stress Test: Nuclear stress test exercise ECG portion was negative  The perfusion study which I reviewed shows a mild apical abnormality most likely artifact  This is a low risk nuclear stress study        Echocardiogram/NATALIE: Echocardiogram with normal EF  Mild grade 1 diastolic dysfunction  Vascular imaging: Carotid imaging with less than 50% unilateral stenosis  ECG Report: normal sinus rhythm no acute st/t wave changes VAS CAROTID COMPLETE STUDY 86Qos0494 08:43AM Annetta CHAVIS Order Number: [id number]       - Patient Instructions: To schedule this appointment, please contact Central Scheduling at [phone]  Test Name Result Flag Reference   VAS CAROTID COMPLETE STUDY (Report)     [facility] REPORT      CLINICAL:      Indications: Carotid disease w/o CVA [I65 23]  Patient presents for a general      health evaluation secondary to other cardiovascular symptoms  Patient is      asymptomatic from a cerebral vascular standpoint  Risk Factors      The patient has history of HTN  Clinical      Right Pressure: 138/68 mm Hg, Left Pressure: 130/60 mm Hg  FINDINGS:            Right        Impression PSV EDV (cm/s) Direction of Flow Ratio       Dist  ICA             94     26           1 24       Mid  ICA             56     20           0 74       Prox  ICA      1 - 49%   59     19           0 78       Dist CCA             73                          Mid CCA              76                 0 95       Prox CCA             80                 1 75       Ext Carotid            91                 1 20       Prox Vert             35       Antegrade               Subclavian            154                          Innominate            46                                Left         Impression PSV EDV (cm/s) Direction of Flow Ratio       Dist  ICA            123     36           1 14       Mid  ICA             93     24           0 86       Prox   ICA      Normal    66     22           0 61       Carotid Bifurcation        50                          Dist CCA             52                          Mid CCA             108                 1 29       Prox CCA             84                          Ext Carotid           103 0 95       Prox Vert             45       Antegrade               Subclavian            127                                            CONCLUSION:      Impression      RIGHT:      There is <50% stenosis noted in the internal carotid artery  Plaque is      heterogenous and smooth  Vertebral artery flow is antegrade  There is no significant subclavian artery      disease  LEFT:      There is no evidence of significant stenosis noted  Vertebral artery flow is antegrade  There is no significant subclavian artery      disease  No prior study available for comparison  Internal carotid artery stenosis determination by consensus criteria from:      Jason Tan , et al  Carotid Artery Stenosis: Gray-Scale and Doppler US Diagnosis      - Society of Radiologists in [facility] [address], Radiology 2003;      [phone]                    Next Follow-up One Year            SIGNATURE:      Electronically Signed by: Dana Camargo MD on 2017 08:59:00 PM      * NM MYOCARDIAL PERFUSION SPECT (STRESS AND/OR REST) [id number] 08:42AM Burnard Gain      Test Name Result Flag Reference   NM MYOCARDIAL PERFUSION SPECT (STRESS AND/OR REST) (Report)     [facility] [address] [place], [address]      [phone]           Rest/Stress Gated SPECT Myocardial Perfusion Imaging After Exercise           Patient: Viola Gordillo      MR number: [id number]      Account number: [de-identified]      : 1940      Age: 69 years      Gender: Female      Status: Outpatient      Location: Stress lab      Height: 61 in      Weight: 204 lb      BP: 144/ 69 mmHg           Allergies: ALLERGIES NOT ON FILE           Diagnosis: R94 39 - Abnormal result of other cardiovascular function study           Primary Physician: Dianna Keene MD      Technician: Audie Maxwell      RN: MARCELO Batres      Referring Physician: Minor Solis MD Group: [facility] Blevins      Report Prepared By[de-identified] BRYANNA Del CidN      Interpreting Physician: Nickolas Bonner MD           INDICATIONS: Assess for Coronary artery disease  HISTORY: The patient is a 69year old  female  Chest pain status: no chest pain  Other symptoms: dyspnea  Cardiovascular history: none significant  Co-morbidity: obesity, age  Medications: no cardiac drugs  Previous test results:      abnormal stress test            PHYSICAL EXAM: Baseline physical exam screening: normal            REST ECG: Normal sinus rhythm  PROCEDURE: The study was performed in the stress lab  Treadmill exercise testing was performed, using the Leno protocol  Gated SPECT myocardial perfusion imaging was performed after stress and at rest  Systolic blood pressure was 144      mmHg, at the start of the study  Diastolic blood pressure was 69 mmHg, at the start of the study  The heart rate was 61 bpm, at the start of the study  IV double checked  LENO PROTOCOL:      HR bpm SBP mmHg DBP mmHg Symptoms Rhythm/conduct      Baseline 81 144 69 none NSR      Stage 1 121 146 70 mild dyspnea --      Stage 2 146 -- -- moderate dyspnea --      Immediate 137 164 70 same as above --      Recovery 1 116 158 70 subsiding --      Recovery 2 82 144 70 none --      No medications or fluids given  STRESS SUMMARY: Duration of exercise was 4 min and 12 sec  Maximal heart rate during stress was 146 bpm ( 101 % of maximal predicted heart rate)  The heart rate response to stress was exaggerated  There was resting hypertension with a      hypertensive blood pressure response to stress  The rate-pressure product for the peak heart rate and blood pressure was 26954  There was no chest pain during stress  The stress test was terminated due to achievement of target heart rate      and moderate dyspnea  Pre oxygen saturation: 97 %  Peak oxygen saturation: 98 %   The stress ECG was negative for ischemia and normal  There were no stress arrhythmias or conduction abnormalities  ISOTOPE ADMINISTRATION:      Resting isotope administration Stress isotope administration      Agent Tetrofosmin Tetrofosmin      Dose 10 8 mCi 32 2 mCi      Date 12/27/2017 12/27/2017           Radiopharmaceutical was administered 3 min, 24 sec into the stress protocol  There was 1 min of exercise after the injection  MYOCARDIAL PERFUSION IMAGING:      The image quality was fair  Rotating projection images reveal mild breast attenuation  Left ventricular size was normal  The TID ratio was 0 95  PERFUSION DEFECTS:      - There was a small, mildly severe, partially reversible myocardial perfusion defect of the apical anterior and apical wall possibly due to attenuation from breast tissue  GATED SPECT:      The calculated left ventricular ejection fraction was 75 %  Left ventricular ejection fraction was within normal limits by visual estimate  There was no diagnostic evidence for left ventricular regional abnormality  SUMMARY:      - Stress results: Duration of exercise was 4 min and 12 sec  Target heart rate was achieved  There was resting hypertension with a hypertensive blood pressure response to stress  There was no chest pain during stress  - ECG conclusions: The stress ECG was negative for ischemia and normal       - Perfusion imaging: There was a small, mildly severe, partially reversible myocardial perfusion defect of the apical anterior and apical wall possibly due to attenuation from breast tissue       - Gated SPECT: The calculated left ventricular ejection fraction was 75 %  Left ventricular ejection fraction was within normal limits by visual estimate  There was no diagnostic evidence for left ventricular regional abnormality  IMPRESSIONS: Equivocal study after pharmacologic vasodilation   There is very mild focal apical defect seen in stress images, worse in stress images  Due to focality of nature of artifact, can not rule out it as artifactual vs minimal focal      ischemia  Normal EF of 77%  Left ventricular systolic function was normal            Prepared and signed by           Ajay Cates MD      Signed 2017 13:29:46      STRESS TEST ONLY, EXERCISE [id number] 08:24AM Torey Simmons    Order Number: [id number]       - Patient Instructions: To schedule this appointment, please contact Central Scheduling at [phone]  Test Name Result Flag Reference   STRESS TEST ONLY, EXERCISE (Report)     [facility]      [address]      [address] [facility], [place] [address]      [phone]           Stress Electrocardiography during Exercise           Name: Jose A Nowak      MR #: [id number]      Account #: [de-identified]      Study date: 2017      : 1940      Age: 69 years      Gender: Female      Height: 61 in      Weight: 206 lb      BSA: 1 91 m squared           Allergies: ALLERGIES NOT ON FILE           Diagnosis: R00 2 - Palpitations           RN: BRYANNA JainN      Group: Dex Lujan      Interpreting Physician: Courtney Ibrahim MD           CLINICAL QUESTION: Detection of coronary artery disease  HISTORY: The patient is a 69year old  female  Chest pain status: no chest pain  Other symptoms: dyspnea and edema  Coronary artery disease risk factors: hypertension  Cardiovascular history: none significant  Medications: no      cardiac drugs  PHYSICAL EXAM: Baseline physical exam screening: normal            REST ECG: Normal sinus rhythm  PROCEDURE: Treadmill exercise testing was performed, using the Leno protocol  Stress electrocardiographic evaluation was performed  The heart rate was 74, at the start of the study  Systolic blood pressure was 122 mmHg, at the start of      the study  Diastolic blood pressure was 72 mmHg, at the start of the study             LENO PROTOCOL:      HR bpm SBP mmHg DBP mmHg Symptoms Rhythm/conduct      Baseline 74 122 72 none NSR      Stage 1 129 142 72 mild dyspnea --      Stage 2 150 -- -- moderate dyspnea, moderate fatigue --      Immediate 150 200 82 same as above --      Recovery 1 90 162 80 subsiding --      Recovery 2 88 150 80 subsiding --      Recovery 3 87 140 82 none --      No medications or fluids given  STRESS SUMMARY: Duration of exercise was 4 min  The patient exercised to protocol stage 2  Maximal work rate was 7 METs  Maximal heart rate during stress was 150 bpm ( 104 % of maximal predicted heart rate)  The heart rate response to      stress was exaggerated  There was normal resting blood pressure with a hypertensive response to stress  The rate-pressure product for the peak heart rate and blood pressure was 80362  There was no chest pain during stress  The stress test      was terminated due to achievement of target heart rate, moderate dyspnea, and moderate fatigue  Pre SaO2=96%      Peak SaO2= 96% <1mm upsloping diffuse st elevations were noted           SUMMARY:      - Stress results: Duration of exercise was 4 min  Target heart rate was achieved  There was normal resting blood pressure with a hypertensive response to stress  There was no chest pain during stress  - ECG conclusions: <1mm upsloping diffuse st elevations were noted      - Impressions and recommendations: Equivocal study after maximal exercise  - Summary: Decrease work capacity noted           IMPRESSIONS: Equivocal study after maximal exercise             Prepared and signed by           Yanique Givens MD      Signed 11/07/2017 11:25:58      ECHO COMPLETE WITH CONTRAST IF INDICATED 24Oct2017 01:39PM Donta Morales      Test Name Result Flag Reference   ECHO COMPLETE WITH CONTRAST IF INDICATED (Report)     [facility] [address]      [phone]           Transthoracic Echocardiogram      2D, M-mode, Doppler, and Color Doppler           Study date: 24-Oct-2017           Patient: Shaylee Dutton      MR number: [id number]      Account number: [de-identified]      : 1940      Age: 69 years      Gender: Female      Status: Routine      Location: Echo lab      Height: 63 in      Weight: 203 5 lb      BP: 132/ 77 mmHg           Indications: Dyspnea           Diagnoses: R06 00 - Dyspnea, unspecified           Sonographer: ELY Dupont      Primary Physician: Nii Luong MD      Referring Physician: MARIPOSA Da Silva      Group: Juan Rivas      Interpreting Physician: Allison Henry MD           SUMMARY           PROCEDURE INFORMATION:      This was a technically difficult study  LEFT VENTRICLE:      Systolic function was normal  Ejection fraction was estimated in the range of 55 % to 60 % to be 55 %  Although no diagnostic regional wall motion abnormality was identified, this possibility cannot be completely excluded on the basis of this study  Wall thickness was mildly increased  There was mild concentric hypertrophy  Doppler parameters were consistent with abnormal left ventricular relaxation (grade 1 diastolic dysfunction)  LEFT ATRIUM:      The atrium was mildly dilated  MITRAL VALVE:      There was mild regurgitation  TRICUSPID VALVE:      There was mild regurgitation  Estimated peak PA pressure was 35 mmHg  HISTORY: PRIOR HISTORY: HTN, Bronchitis           PROCEDURE: The procedure was performed in the echo lab  This was a routine study  The transthoracic approach was used  The study included complete 2D imaging, M-mode, complete spectral Doppler, and color Doppler  The heart rate was 64 bpm,      at the start of the study  Images were obtained from the parasternal, apical, subcostal, and suprasternal notch acoustic windows  This was a technically difficult study             LEFT VENTRICLE: Size was normal  Systolic function was normal  Ejection fraction was estimated in the range of 55 % to 60 % to be 55 %  Although no diagnostic regional wall motion abnormality was identified, this possibility cannot be      completely excluded on the basis of this study  Wall thickness was mildly increased  There was mild concentric hypertrophy  DOPPLER: Doppler parameters were consistent with abnormal left ventricular relaxation (grade 1 diastolic      dysfunction)  RIGHT VENTRICLE: The size was normal  Systolic function was normal            LEFT ATRIUM: The atrium was mildly dilated  RIGHT ATRIUM: Size was normal            MITRAL VALVE: There was normal leaflet separation  DOPPLER: The transmitral velocity was within the normal range  There was no evidence for stenosis  There was mild regurgitation  AORTIC VALVE: The valve was trileaflet  Leaflets exhibited mildly increased thickness, mild calcification, and normal cuspal separation  DOPPLER: Transaortic velocity was within the normal range  There was no evidence for stenosis  There      was no regurgitation  TRICUSPID VALVE: DOPPLER: There was mild regurgitation  Estimated peak PA pressure was 35 mmHg  PULMONIC VALVE: DOPPLER: There was trace regurgitation  PERICARDIUM: There was no thickening or calcification  There was no pericardial effusion  AORTA: The root exhibited normal size  SYSTEMIC VEINS: IVC: The inferior vena cava was normal in size   Respirophasic changes were normal            SYSTEM MEASUREMENT TABLES           2D mode      AoR Diam 2D: 3 1 cm      LA Diam (2D): 3 6 cm      LA/Ao (2D): 1 16      FS (2D Teich): 25 5 %      IVSd (2D): 1 52 cm      LVDEV: 41 6 cmï¾³      LVEDV MOD BP: 111 cmï¾³      LVESV: 20 2 cmï¾³      LVIDd(2D): 3 22 cm      LVISd (2D): 2 4 cm      LVOT Area 2D: 3 46 cm squared      LVPWd (2D): 1 39 cm      SV (Teich): 21 4 cmï¾³           Apical four chamber      LVEF A4C: 53 % Apical two chamber      LA Area: 15 cm squared      LA Volume: 38 cmï¾³      LVEF A2C: 58 %           Unspecified Scan Mode      JELLY Cont Eq (Peak Baron): 2 44 cm squared      LVOT Diam : 2 1 cm      LVOT Vmax: 972 mm/s      LVOT Vmax; Mean: 972 mm/s      Peak Grad ; Mean: 4 mm[Hg]      MV Peak A Baron: 943 mm/s      MV Peak E Baron  Mean: 701 mm/s      MVA (PHT): 3 49 cm squared      PHT: 63 ms      Max P mm[Hg]      V Max: 2250 mm/s      Vmax: 2500 mm/s      RA Area: 11 5 cm squared      RA Volume: 25 6 cmï¾³      TAPSE: 2 cm           [facility] Accredited Echocardiography Laboratory           Prepared and electronically signed by           Srinath Ewing MD      Signed 25-Oct-2017 12:00:23      Health Management   Moeller esophagus   COLONOSCOPY; every 5 years; Last 2014; Next Due: [id number]; Active    Future Appointments      Date/Time Provider Specialty Site   2018 01:00 PM ORVILLE Hurd  [address] [facility] PRACT[facility]   2018 08:15 AM ORVILLE Cisneros   Pulmonary Medicine [address]     Signatures    Electronically signed by : ORVILLE Hodges ; Cisco 15 2018 12:44PM EST                       (Author)

## 2018-01-16 NOTE — CONSULTS
Assessment    1  Dyspnea on exertion (786 09) (R06 09)   2  Daytime hypersomnolence (780 54) (G47 19)   3  BMI 35 0-35 9,adult (V85 35) (Z68 35)    Plan  Daytime hypersomnolence    · *Polysomnography, Sleep Study, Diagnostic; Status:Need Information - Financial  Authorization; Requested ONV:[id number];    Perform:[facility]; Due:18Oct2018; Ordered; For:Daytime hypersomnolence; Ordered By:Sheri Mullen;  there any other medical conditions or medications that would explain these      symptoms? : No  is the sleep disturbance affecting the patient's ability to function? : yes  History/Symptoms: : snores  Study Only or Consult : Sleep Study and Consult and F/U with Sleep Specialist  Dyspnea on exertion    · ECHO FOLLOW UP/LIMITED; Status:Need Information - Financial Authorization; Requested for:18Oct2017;    Perform:[facility]; Order Comments:DR Roxana Sanchez; Due:18Oct2018; Ordered; For:Dyspnea on exertion; Ordered By:Sheri Mullen; Results/Data  PFT Results v2:     Spirometry: Forced vital capacity: 2 27L and 94% Predicted Values  Forced expiratory volume in one second: 2 10L  FEV1/FVC ratio is 92  Post Bronchodilator Spirometry:   Lung Volumes:   DLCO:    PFT Interpretation:   normal spirometry  Discussion/Summary  Discussion Summary:   Roxanne pichardo is a 26-year-old female who likely has diagnosis of obstructive sleep apnea  [place] Sleepiness Scale is 20 , she has had witnessed apnea and is agreeable to diagnostic study  I reviewed anatomy the upper airway diagnosis of GEORGINA and treatment  She is aware that gold standard treatment for obstructive sleep apnea is CPAP    And he has exertional dyspnea PFTs and flow volume loop were near normal oxygen saturation on room air was 94% with ambulation remains at 94-95%  She has reported feeling more short of breath in the past year and in walking short distances has to stop   I am referring her to Cardiology for evaluation and also for 2D echocardiogram     Dyspnea could be multifactorial: Obesity, deconditioning and possible coronary etiology are possibilities  Follow-up after diagnostic sleep study  Goals and Barriers: The patient has the current Goals: Nancy will have 2D echo and diagnostic sleep study  Patient's Capacity to Self-Care: Patient is able to Self-Care  Patient Education: Educational resources provided: GEORGINA        Active Problems    · Abrasion On The Chest (911 0)   · Acute bronchitis with bronchospasm (466 0) (J20 9)   · Acute sinusitis (461 9) (J01 90)   · Acute upper respiratory infection (465 9) (J06 9)   · Allergic rhinitis (477 9) (J30 9)   · Arm bruise (923 9) (S40 029A)   · 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)   · Benign essential hypertension (401 1) (I10)   · Bilateral leg edema (782 3) (R60 0)   · BMI 35 0-35 9,adult (V85 35) (Z68 35)   · BMI 36 0-36 9,adult (V85 36) (Z68 36)   · Cataract (366 9) (H26 9)   · Colon, diverticulosis (562 10) (K57 30)   · Colonoscopy (Fiberoptic) Screening   · Costochondritis (733 6) (M94 0)   · Cough (786 2) (R05)   · Depression screen (V79 0) (Z13 89)   · Diarrhea (787 91) (R19 7)   · Dysuria (788 1) (R30 0)   · Exposure to herpes (V01 79) (Z20 828)   · Fear Of Possible Disease (V65 5)   · Feeling weak (780 79) (R53 1)   · Foreign body granuloma of skin and subcutaneous tissue (709 4) (L92 3)   · Fracture of humerus (812 20) (S42 309A)   · 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) (M15 9)   · Influenza vaccination declined (V64 06) (Z28 21)   · Left buttock pain (729 1) (M79 1)   · Nausea (787 02) (R11 0)   · Overweight (278 02) (E66 3)   · Preop examination (V72 84) (Z01 818)   · Right foot pain (729 5) (M79 671)   · Right-sided chest wall pain (786 52) (R07 89)   · Screening for cardiovascular condition (V81 2) (Z13 6)   · Screening for diabetes mellitus (V77 1) (Z13 1)   · Screening for genitourinary condition (V81 6) (Z13 89)   · Screening for thyroid disorder (V77 0) (Z13 29)   · Severe lt groin pain (789 09) (R10 30)   · Sleep disorder breathing (780 59) (G47 30)   · Upper back pain on right side (724 5) (M54 9)   · Urgency of urination (788 63) (R39 15)   · Urinary tract infection (599 0) (N39 0)   · UTI (urinary tract infection) (599 0) (N39 0)   · 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 (454 2) (I83 229,I83 219)    Chief Complaint  Chief Complaint Free Text Note Form: Steve Luo is here for a sleep consult  She states her daughter recognized she stopped breathing several times during the night  This was also observed by her   She also c/o "constant stuffiness in her right sinus"      History of Present Illness  HPI: Enzo Hollingsworth is a 71-year-old female with benign essential hypertension and snoring  According to patient she was stating her daughters and her daughter had observed her during sleep with apneic events  She is very tired when she wakes up in the morning an [place] Sleepiness Scale is a ten  Nancy has never smoked  She is retired  She does report less chest heaviness before sleeping  And has noticed exertional dyspnea which has worsened thank you      Review of Systems  Complete-Female - Pulm:   Constitutional: snores, but No fever, no chills, feels well, no tiredness, no recent weight gain or weight loss  Eyes: no complaints of vision problems  ENT: no rhinitis, no PND, no epistaxis  Cardiovascular: no palpitations, no chest pain  Respiratory: shortness of breath during exertion, but as noted in HPI and as noted in HPI  Gastrointestinal: no complaints of esophageal reflux, no abdominal pain  Genitourinary: no dysuria  Musculoskeletal: no arthralgias, no joint swelling, no myalgias  Integumentary: no rash, no lesions  Neurological: no headache, no fainting, no weakness  Psychiatric: no anxiety, no depression  Hematologic/Lymphatic: - no complaints of swollen glands  Past Medical History    1   Acute bronchitis (466 0) (J20 9)   2  History of Anxiety (300 00) (F41 9)   3  History of Bleeding from varicose vein (454 8) (I83 899)   4  History of Environmental allergies (V15 09) (Z91 09)   5  History of abdominal pain (V13 89) (Z87 898)   6  History of fracture of radius (V15 51) (Z87 81)   7  History of obesity (V13 89) (Z86 39)   8  History of scoliosis (V13 59) (Z87 39)   9  History of Normal ECG   10  History of Rash (782 1) (R21)   11  History of Screening for genitourinary condition (V81 6) (Z13 89)   12  History of Vaginitis and vulvovaginitis (616 10) (N76 0)  Active Problems And Past Medical History Reviewed: The active problems and past medical history were reviewed and updated today  Surgical History    1  History of Cataract Surgery  Surgical History Reviewed: The surgical history was reviewed and updated today  Family History  Mother    1  No pertinent family history  Family History Reviewed: The family history was reviewed and updated today  Social History    · Being A Social Drinker   · Never A Smoker  Social History Reviewed: The social history was reviewed and updated today  Current Meds   1  Aleve 220 MG Oral Tablet; take 1 tablet as needed; Therapy: (Recorded:08Jun2015) to Recorded   2  B Complex Vitamins CAPS; TAKE 1 CAPSULE Daily Recorded   3  Ciprofloxacin HCl - 500 MG Oral Tablet; TAKE 1 TABLET EVERY 12 HOURS DAILY; Therapy: 94DHJ0664 to (Evaluate:42Mee5777)  Requested for: 63KWR7455; Last   RZ:77CFV0512 Ordered   4  Fluticasone Propionate 50 MCG/ACT Nasal Suspension; USE 2 SPRAYS IN EACH   NOSTRIL DAILY  Requested for: 53ZVI9214; Last Rx:97Clk4846 Ordered   5  Nystatin-Triamcinolone 993607-2 5 UNIT/GM-% External Cream;   Therapy: (Recorded:26Sep2016) to Recorded   6  ZyrTEC Allergy 10 MG Oral Capsule; 1 Cap daily Recorded  Medication List Reviewed: The medication list was reviewed and updated today  Allergies    1  Penicillins   2   Sulfa Drugs    Vitals  Vital Signs Recorded: 77XJX0053 01:26PM   Temperature 98 5 F, Oral   Heart Rate 77   Pulse Quality Normal   Respiration Quality Normal   Respiration 20   Systolic 524, RUE, Sitting   Diastolic 72, RUE, Sitting   Height 5 ft 1 in   Weight 204 lb    BMI Calculated 38 55   BSA Calculated 1 91   O2 Saturation 96     Physical Exam    Constitutional   General appearance: No acute distress, well appearing and well nourished  Eyes   Examination of pupil and irises: Anicteric, pupils reactive  Ears, Nose, Mouth, and Throat   External inspection of ears and nose: Normal     Nasal mucosa, septum, and turbinates: Normal without edema or erythema  Lips, teeth, and gums: Normal, good dentition  Oropharynx: Normal with no erythema, edema, exudate or lesions  Mallampati Classification: 2  Neck   Neck: Supple, symmetric, trachea midline, no masses  Jugular veins: Normal     Pulmonary   Chest: Normal     Respiratory effort: No increased work of breathing or signs of respiratory distress  Auscultation of lungs: Clear to auscultation, no rales, no crackles, no wheezing  Cardiovascular   Auscultation of heart: Normal rate and rhythm, normal S1 and S2, no murmurs  Examination of extremities for edema and/or varicosities: Normal     Abdomen   Abdomen: Soft, non-tender  Lymphatic   Palpation of lymph nodes in neck: No lymphadenopathy  Musculoskeletal   Gait and station: Normal     Digits and nails: Normal without clubbing or cyanosis  Neurologic   Mental Status: Normal  Not confused, no evidence of dementia, good comprehension, good concentration  Skin   Skin and subcutaneous tissue: Limited exam shows no rash  Psychiatric   Orientation to person, place and time: Normal     Mood and affect: Normal        Future Appointments    Date/Time Provider Specialty Site   01/22/2018 01:00 PM ORVILLE Ku   Family Medicine [facility]     Signatures   Electronically signed by : CHANELL Moreau; Oct 18 2017  2:11PM EST                       (Author)

## 2018-01-17 NOTE — MISCELLANEOUS
Message  urine cult grew out > 100,000 ecoli sens to everything will rx with cipro 500 bid since all to sulfa and monitor instructions      Plan  Urinary tract infection    · Ciprofloxacin HCl - 500 MG Oral Tablet; 1 PO BID    Signatures   Electronically signed by : ORVILLE Osborne ; Jun 13 2016 10:04AM EST                       (Author)

## 2018-01-18 NOTE — RESULT NOTES
Verified Results  (1) URINE CULTURE [id number] 02:00PM [facility]     Test Name Result Flag Reference   CLINICAL REPORT (Report)     Test:        Urine culture  Specimen Type:   Urine  Specimen Date:   5/8/2017 2:00 PM  Result Date:    5/10/2017 12:16 PM  Result Status:   Final result  Resulting Lab:   [facility] [address] [address]            Tel: [phone]      CULTURE                                       ------------------                                   >100,000 cfu/ml Citrobacter koseri      SUSCEPTIBILITY                                   ------------------                                                       Citrobacter koseri  METHOD                 ADRIENNE  -------------------------------------  -------------------------  AMOXICILLIN + CLAVULANATE        <=8/4 ug/ml  Susceptible  AMPICILLIN ($$)             >16 00 ug/ml Resistant  AMPICILLIN + SULBACTAM ($)       <=8/4 ug/ml  Susceptible  AZTREONAM ($$$)             <=8 ug/ml   Susceptible  CEFAZOLIN ($)              <=8 00 ug/ml Susceptible  CIPROFLOXACIN ($)            <=1 00 ug/ml Susceptible  ERTAPENEM ($$$)             <=2 0 ug/ml  Susceptible  GENTAMICIN ($$)             <=4 ug/ml   Susceptible  IMIPENEM                <=4 ug/ml   Susceptible  LEVOFLOXACIN ($)            <=2 00 ug/ml Susceptible  MEROPENEM ($$)             <=4 00 ug/ml Susceptible  NITROFURANTOIN             <=32 ug/ml  Susceptible  PIPERACILLIN + TAZOBACTAM ($$$)     <=16 ug/ml  Susceptible  TETRACYCLINE              <=4 ug/ml   Susceptible  TOBRAMYCIN ($)             <=4 ug/ml   Susceptible  TRIMETHOPRIM + SULFAMETHOXAZOLE ($$$)  <=2/38 ug/ml Susceptible     Urine Dip Automated- POC [id number] 10:46AM [facility]     Test Name Result Flag Reference   Color Yellow     Clarity Transparent     Leukocytes 500     Nitrite neg     Blood 50     Bilirubin neg     Urobilinogen norm     Protein neg     Ph 5     Specific Gravity 1 025     Ketone neg     Glucose norm         Plan  Dysuria    · (1) URINE CULTURE; Source:Urine, Clean Catch; Status:Active;  Requested  for:27Aaz8351;

## 2018-01-19 ENCOUNTER — ALLSCRIPTS OFFICE VISIT (OUTPATIENT)
Dept: OTHER | Facility: OTHER | Age: 78
End: 2018-01-19

## 2018-01-20 NOTE — PROGRESS NOTES
Assessment   1  Obstructive sleep apnea (327 23) (G47 33)   2  Dyspnea on exertion (786 09) (R06 09)   3  Allergic rhinitis (477 9) (J30 9)    Plan   Obstructive sleep apnea    · Follow-up visit in 6 months Evaluation and Treatment  Follow-up  Status: Hold For -    Scheduling  Requested for: 01HQS7716   Ordered; For: Obstructive sleep apnea; Ordered By: Cassy Bravo Performed:  Due: 95EXJ3426    Results/Data   Results Free Text Form  [facility]:    Results      Other Compliance data is reviewed and shows average time in leak on a daily basis is 26 minutes and average AHI on CPAP 7 cm of water is 9 /hour  This is due to an elevated hypopnea index  Spirometry performed 10/18/2017 showed normal values  Discussion/Summary   Discussion Summary:    1  GEORGINA- Given increased air leak this is likely the cause for her elevated AHI as during her titration study she did not have this elevation in  She will be fitted for nasal pillows today in order to decrease this air leak  Would not change her pressures at this time  Obtain repeat compliance download in approximately 1 month and assess residual AHI  patient still feels that the air is still warm for her despite change in settings  She will be referred to young medical therapist to assess that all of the settings are intact  Periodic mask, tubing, filter, cushion replacement every 1-3 months as well as periodic  yearly is recommended  Patient is advised to avoid narcotics, sedatives, alcohol, supine sleep  The patient is advised not to drive drowsy under any circumstance  Utilization of CPAP during all sleep periods including is emphasized  The patient is advised to take the machine with them when they travel  Avoid weight gain and advise weight loss  2  Dyspnea on exertion is likely in relation to body habitus/ Deconditioning -this is improving per her   utility of daily exercise and physical activity is discussed with her in detail     Hypertension Better controlled     4  Allergic rhinitis for which Flonase works well for her  If she should find out which pharmacy supplies the Flonase with the manufacture that works for her then we will place prescription for her  follow-up in 6 months or sooner if needed  All questions are answered to her satisfaction understanding  She verbalized understanding  She is encouraged to call with any further questions or concerns  Counseling Documentation With Imm: The patient, patient's family was counseled regarding diagnostic results  Patient's Capacity to Self-Care: Patient is able to Self-Care  Medication SE Review and Pt Understands Tx: Possible side effects of new medications were reviewed with the patient/guardian today  The treatment plan was reviewed with the patient/guardian  The patient/guardian understands and agrees with the treatment plan      Active Problems   1  Abnormal stress ECG with treadmill (794 39) (R94 39)   2  Abrasion On The Chest (911 0)   3  Acute bronchitis with bronchospasm (466 0) (J20 9)   4  Acute sinusitis (461 9) (J01 90)   5  Acute upper respiratory infection (465 9) (J06 9)   6  Allergic rhinitis (477 9) (J30 9)   7  Arm bruise (923 9) (S40 029A)   8  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)   9  Benign essential hypertension (401 1) (I10)   10  Bilateral carotid artery stenosis (433 10,433 30) (I65 23)   11  Bilateral leg edema (782 3) (R60 0)   12  BMI 35 0-35 9,adult (V85 35) (Z68 35)   13  BMI 36 0-36 9,adult (V85 36) (Z68 36)   14  Carotid atherosclerosis, unspecified laterality (433 10) (I65 29)   15  Cataract (366 9) (H26 9)   16  Colon, diverticulosis (562 10) (K57 30)   17  Colonoscopy (Fiberoptic) Screening   18  Costochondritis (733 6) (M94 0)   19  Cough (786 2) (R05)   20  Daytime hypersomnolence (780 54) (G47 19)   21  Depression screen (V79 0) (Z13 89)   22  Diarrhea (787 91) (R19 7)   23  Dyspnea on exertion (786 09) (R06 09)   24  Dysuria (788 1) (R30 0)   25   Exposure to herpes (V01 79) (Z20 828)   26  Fear Of Possible Disease (V65 5)   27  Feeling weak (780 79) (R53 1)   28  Foreign body granuloma of skin and subcutaneous tissue (709 4) (L92 3)   29  Fracture of humerus (812 20) (S42 309A)   30  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) (M15 9)   31  Influenza vaccination declined (V64 06) (Z28 21)   32  Left buttock pain (729 1) (M79 1)   33  Nausea (787 02) (R11 0)   34  Obstructive sleep apnea (327 23) (G47 33)   35  Overweight (278 02) (E66 3)   36  Palpitations (785 1) (R00 2)   37  Preop examination (V72 84) (Z01 818)   38  Right foot pain (729 5) (M79 671)   39  Right-sided chest wall pain (786 52) (R07 89)   40  Screening for cardiovascular condition (V81 2) (Z13 6)   41  Screening for diabetes mellitus (V77 1) (Z13 1)   42  Screening for genitourinary condition (V81 6) (Z13 89)   43  Screening for thyroid disorder (V77 0) (Z13 29)   44  Severe lt groin pain (789 09) (R10 30)   45  Sleep disorder breathing (780 59) (G47 30)   46  Upper back pain on right side (724 5) (M54 9)   47  Urgency of urination (788 63) (R39 15)   48  Urinary tract infection (599 0) (N39 0)   49  UTI (urinary tract infection) (599 0) (N39 0)   50  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 (454 2) (I83 229,I83 219)    Chief Complaint   Chief Complaint Free Text Note Form: Pt presents today for sleep compliance  Pt states the machine gets too hot when she uses which makes her mouth feel dry  Chief Complaint Chronic Condition [facility]: Patient is here today for follow up of chronic conditions described in HPI  History of Present Illness   HPI: Patients a 50-year-old female With history of hypertension, osteoarthritis, severe obstructive sleep apnea, Moeller's esophagus, allergic rhinitis, diverticulosis, carotid artery disease who presents for follow-up visit  she has been experiencing increased nasal congestion as the weather has been happy as ordered   She does use generic Flonase however most recently her generic Flonase many factor was switched due to the pharmacy that she obtains it from  With this new Flonase she has been experiencing nose bleeds  This had happened to her in the past when her Flonase was switched and when she switched back to the 1 that worked well for her nose bleeds improved  She is using her CPAP machine every night for the entire duration of her sleep time  She has noticed that there has been some leakage of air into her eyes  Also she is still having difficulty with the humidification and she feels that this is still warm  She does occasionally nap during the day however this is not planned and therefore she is not using the CPAP machine at that time  Her [place] on today's visit is 9/24 was ill with stomach flu recently  Blood pressures have been better controlled  Her meds were adjusted by Cardiology  has been experiencing left lower extremity edema which she has had for many years now  She states it is in relation to a broken ankle as well as osteoarthritis of that knee  dyspnea on exertion is much improved  She denies any wheezing  No shortness of breath at rest  No cough  12/1/2017: received her machine yesterday  She does not like the heated humidification she states that in the sleep lab she did have cold humidification and that is what she would like to use at home  The position in which she slept at the [address] is different from how she sleeps at home  She states that at home she sleeps with multiple pillows at a slight incline with her head rolled over a pillow in order to relieve obstruction  Her breathing has been a little better  however she still experiences shortness of breath on exertion  She can walk a distance and then needs to stop due to fatigue and shortness of Breath and then is able to walk the rest of the distance     She states that she did recently have a stress test and she did well doing a stress test and she feels that that was because she was able to support her upper body with the use of the side bars on the treadmill  She was able to walk faster without any discomfort in terms of fatigue her breathing  She denies any cough  No wheezing  She does state that she has been having difficulty controlling her high blood pressure  She has a follow-up with Cardiology next week  11/24      Review of Systems   Complete-Female - Pulm:      Constitutional: no fever,-- no chills,-- not feeling tired-- and-- no recent weight loss  Eyes: no purulent discharge from the eyes  ENT: no nasal discharge  Cardiovascular: as noted in HPI  Respiratory: as noted in HPI  Gastrointestinal: no abdominal pain,-- no nausea-- and-- no vomiting  Genitourinary: no dysuria  Musculoskeletal: arthralgias  Integumentary: no rashes  Neurological: no headache  Psychiatric: no anxiety-- and-- no depression  Endocrine: no muscle weakness  Hematologic/Lymphatic: no swollen glands  Past Medical History   1  Acute bronchitis (466 0) (J20 9)   2  History of Anxiety (300 00) (F41 9)   3  History of Bleeding from varicose vein (454 8) (I83 899)   4  History of Environmental allergies (V15 09) (Z91 09)   5  History of abdominal pain (V13 89) (Z87 898)   6  History of fracture of radius (V15 51) (Z87 81)   7  History of obesity (V12 29) (Z86 39)   8  History of scoliosis (V13 59) (Z87 39)   9  History of Normal ECG   10  History of Rash (782 1) (R21)   11  History of Screening for genitourinary condition (V81 6) (Z13 89)   12  History of Vaginitis and vulvovaginitis (616 10) (N76 0)    Surgical History   1  History of Cataract Surgery    Family History   Mother    1  No pertinent family history    Social History    · Being A Social Drinker   · Never A Smoker    Current Meds    1  Aleve 220 MG Oral Tablet; take 1 tablet as needed;      Therapy: (Recorded:08Jun2015) to Recorded 2  Aspirin 81 MG Oral Tablet Chewable; Take one tablet daily; Therapy: 24EEF6000 to [id number])  Requested for: 88JWZ9627; Last     Rx:15Jan2018 Ordered   3  B Complex Vitamins CAPS; TAKE 1 CAPSULE Daily Recorded   4  Fluticasone Propionate 50 MCG/ACT Nasal Suspension; USE 2 SPRAYS IN EACH     NOSTRIL DAILY  Requested for: 86FOI3712; Last Rx:02Jan2018 Ordered   5  HydroCHLOROthiazide 12 5 MG Oral Tablet; TAKE 1 TABLET DAILY IN THE MORNING; Therapy: 64GUT6810 to (Evaluate:10Jan2019)  Requested for: 29XLV4287; Last     Rx:15Jan2018 Ordered   6  Nystatin-Triamcinolone 248822-2 7 UNIT/GM-% External Cream;     Therapy: (Recorded:26Sep2016) to Recorded   7  Potassium Chloride ER 10 MEQ Oral Tablet Extended Release; take one tablet daily in     morning with hctz; Therapy: 91NHM0530 to (Nataliya Moran)  Requested for: 13GJE8563; Last     Rx:15Jan2018 Ordered   8  ZyrTEC Allergy 10 MG Oral Capsule; 1 Cap daily Recorded  Medication List Reviewed: The medication list was reviewed and updated today  Allergies   1  Penicillins   2  Sulfa Drugs    Vitals   Vital Signs    Recorded: 26JKW0740 08:12AM   Temperature 98 3 F, Oral   Heart Rate 73   Pulse Quality Normal   Respiration Quality Normal   Respiration 12   Systolic 604, LUE, Sitting   Diastolic 46, LUE, Sitting   Height 5 ft 1 in   Weight 201 lb    BMI Calculated 37 98   BSA Calculated 1 89   O2 Saturation 94     Physical Exam        Constitutional      General appearance: No acute distress, well appearing and well nourished  Ears, Nose, Mouth, and Throat      External inspection of ears and nose: Normal        Oropharynx: Normal with no erythema, edema, exudate or lesions  Mallampati Classification: 1  Neck      Neck: Supple, symmetric, trachea midline, no masses  Pulmonary      Chest: Normal        Respiratory effort: No increased work of breathing or signs of respiratory distress         Auscultation of lungs: Clear to auscultation, no rales, no crackles, no wheezing  Cardiovascular      Auscultation of heart: Normal rate and rhythm, normal S1 and S2, no murmurs  Examination of extremities for edema and/or varicosities: Abnormal  -- non pitting edema of left lower extremity edema  Abdomen      Abdomen: Soft, non-tender  Lymphatic      Palpation of lymph nodes in neck: No lymphadenopathy  Musculoskeletal      Gait and station: Normal        Neurologic      Mental Status: Normal  Not confused, no evidence of dementia, good comprehension, good concentration  Skin      Skin and subcutaneous tissue: Limited exam shows no rash  Psychiatric      Orientation to person, place and time: Normal        Mood and affect: Normal        Health Management   Moeller esophagus   COLONOSCOPY; every 5 years; Last 28Apr2014; Next Due: 60NCI6618; Active    Future Appointments      Date/Time Provider Specialty Site   01/22/2018 01:00 PM ORVILLE Parrish   Family Medicine [facility]     Signatures    Electronically signed by : ORVILLE Pierre ; Jan 19 2018  9:28AM EST                       (Author)

## 2018-01-22 VITALS
DIASTOLIC BLOOD PRESSURE: 76 MMHG | HEART RATE: 69 BPM | SYSTOLIC BLOOD PRESSURE: 132 MMHG | BODY MASS INDEX: 37.57 KG/M2 | OXYGEN SATURATION: 96 % | WEIGHT: 199 LBS | HEIGHT: 61 IN

## 2018-01-22 VITALS
HEIGHT: 61 IN | OXYGEN SATURATION: 94 % | TEMPERATURE: 98.3 F | WEIGHT: 201 LBS | BODY MASS INDEX: 37.95 KG/M2 | HEART RATE: 73 BPM | SYSTOLIC BLOOD PRESSURE: 136 MMHG | RESPIRATION RATE: 12 BRPM | DIASTOLIC BLOOD PRESSURE: 46 MMHG

## 2018-01-22 VITALS
BODY MASS INDEX: 38.14 KG/M2 | RESPIRATION RATE: 12 BRPM | HEART RATE: 72 BPM | DIASTOLIC BLOOD PRESSURE: 82 MMHG | OXYGEN SATURATION: 98 % | WEIGHT: 202 LBS | TEMPERATURE: 98.7 F | HEIGHT: 61 IN | SYSTOLIC BLOOD PRESSURE: 142 MMHG

## 2018-01-22 VITALS
SYSTOLIC BLOOD PRESSURE: 140 MMHG | HEIGHT: 63 IN | RESPIRATION RATE: 18 BRPM | HEART RATE: 91 BPM | OXYGEN SATURATION: 95 % | WEIGHT: 203 LBS | BODY MASS INDEX: 35.97 KG/M2 | TEMPERATURE: 96.4 F | DIASTOLIC BLOOD PRESSURE: 70 MMHG

## 2018-01-22 VITALS
HEIGHT: 61 IN | TEMPERATURE: 98.5 F | DIASTOLIC BLOOD PRESSURE: 72 MMHG | RESPIRATION RATE: 20 BRPM | OXYGEN SATURATION: 96 % | BODY MASS INDEX: 38.51 KG/M2 | WEIGHT: 204 LBS | HEART RATE: 77 BPM | SYSTOLIC BLOOD PRESSURE: 132 MMHG

## 2018-01-23 VITALS
BODY MASS INDEX: 38.51 KG/M2 | OXYGEN SATURATION: 98 % | HEART RATE: 63 BPM | HEIGHT: 61 IN | SYSTOLIC BLOOD PRESSURE: 132 MMHG | DIASTOLIC BLOOD PRESSURE: 78 MMHG | WEIGHT: 204 LBS

## 2018-01-24 VITALS
WEIGHT: 202 LBS | OXYGEN SATURATION: 98 % | DIASTOLIC BLOOD PRESSURE: 82 MMHG | RESPIRATION RATE: 12 BRPM | BODY MASS INDEX: 38.14 KG/M2 | HEIGHT: 61 IN | SYSTOLIC BLOOD PRESSURE: 142 MMHG | TEMPERATURE: 98.7 F | HEART RATE: 72 BPM

## 2018-01-30 ENCOUNTER — OFFICE VISIT (OUTPATIENT)
Dept: FAMILY MEDICINE CLINIC | Facility: CLINIC | Age: 78
End: 2018-01-30
Payer: MEDICARE

## 2018-01-30 VITALS
TEMPERATURE: 99.3 F | DIASTOLIC BLOOD PRESSURE: 62 MMHG | BODY MASS INDEX: 37.6 KG/M2 | HEART RATE: 90 BPM | OXYGEN SATURATION: 96 % | RESPIRATION RATE: 22 BRPM | SYSTOLIC BLOOD PRESSURE: 160 MMHG | WEIGHT: 199 LBS

## 2018-01-30 DIAGNOSIS — J32.0 CHRONIC SINUSITIS OF BOTH MAXILLARY SINUSES: ICD-10-CM

## 2018-01-30 DIAGNOSIS — R10.13 EPIGASTRIC PAIN: Primary | ICD-10-CM

## 2018-01-30 DIAGNOSIS — J06.9 VIRAL UPPER RESPIRATORY TRACT INFECTION: Primary | ICD-10-CM

## 2018-01-30 PROCEDURE — 99213 OFFICE O/P EST LOW 20 MIN: CPT | Performed by: FAMILY MEDICINE

## 2018-01-30 RX ORDER — HYDROCHLOROTHIAZIDE 12.5 MG/1
1 TABLET ORAL DAILY
COMMUNITY
Start: 2018-01-15 | End: 2019-01-21

## 2018-01-30 RX ORDER — POTASSIUM CHLORIDE 750 MG/1
CAPSULE, EXTENDED RELEASE ORAL
COMMUNITY
Start: 2018-01-15 | End: 2018-07-09

## 2018-01-30 RX ORDER — FLUTICASONE PROPIONATE 50 MCG
2 SPRAY, SUSPENSION (ML) NASAL DAILY
COMMUNITY
End: 2018-03-20 | Stop reason: SDUPTHER

## 2018-01-30 RX ORDER — ASPIRIN 81 MG/1
1 TABLET, CHEWABLE ORAL DAILY
COMMUNITY
Start: 2018-01-15

## 2018-01-30 NOTE — PROGRESS NOTES
Assessment/Plan:    Patient was here complaining of dry cough and funny taste, and sinus pressure,  Discussed with patient that this is most likely viral infection and her chronic sinusitis,  Treatment is supportive, there is no need for antibiotics, hold diuretics for coming to 3 days,  Can use honey with cinnamon for the cough,  Continue to  Use Flonase  2 sprays in each nostril daily for at least 2 months,  Use humidifier,  Continue to use CPAP  If her symptoms will get worse or she will develop  High fever which would not resolve in 3 days patient should return back to the clinic to be re-evaluated  Offered her flu vaccine but patient declined       Diagnoses and all orders for this visit:    Viral upper respiratory tract infection    Chronic sinusitis of both maxillary sinuses          Subjective:      Patient ID: Zaria Kothari is a 69 y o  female  28-year-old female came to the clinic complaining of dry cough, funny taste in her mouth, nasal congestion but no runny nose, no fever but sometimes she feels chills, she was taking TheraFlu,  Which helped a little bit,  She denies any nausea, vomiting, shortness of breath,  She does have a feeling of pressure in her cheeks,  She uses Flonase for her sinusitis but not regularly,  She has post nasal drip  She recently so her cardiologist which started her on low-dose diuretic  With potassium and baby aspirin  She also was diagnosed with sleep apnea, follows with Pulmonary and uses CPAP every night,  She did not have any problem with sleeping last night  Her  was admitted for community-acquired pneumonia in late December  The following portions of the patient's history were reviewed and updated as appropriate: allergies, current medications, past family history, past medical history, past social history, past surgical history and problem list     Review of Systems   Constitutional: Negative      HENT: Positive for congestion, postnasal drip, sinus pain, sinus pressure and voice change  Negative for drooling, ear discharge, ear pain, facial swelling, hearing loss, mouth sores, nosebleeds, rhinorrhea, sneezing, sore throat, tinnitus and trouble swallowing  Respiratory: Positive for cough  Negative for apnea, choking, chest tightness, shortness of breath, wheezing and stridor  Cardiovascular: Negative  Gastrointestinal: Negative  Genitourinary: Negative for dysuria, frequency, pelvic pain and urgency  Musculoskeletal: Negative  Skin: Negative  Allergic/Immunologic: Negative for immunocompromised state  Neurological: Negative  Hematological: Negative  Psychiatric/Behavioral: Negative  Objective:     Physical Exam   Constitutional: She is oriented to person, place, and time  She appears well-developed and well-nourished  No distress  HENT:   Head: Normocephalic and atraumatic  Right Ear: External ear normal    Left Ear: External ear normal    Mouth/Throat: Oropharynx is clear and moist  No oropharyngeal exudate  Mild erythema in the nasal mucosa with no discharge, mild tenderness at  the maxillary sinuses   Eyes: Conjunctivae and EOM are normal  Pupils are equal, round, and reactive to light  Right eye exhibits no discharge  Left eye exhibits no discharge  No scleral icterus  Neck: Normal range of motion  Neck supple  No JVD present  No tracheal deviation present  No thyromegaly present  Cardiovascular: Normal rate, regular rhythm, normal heart sounds and intact distal pulses  Exam reveals no gallop and no friction rub  No murmur heard  Pulmonary/Chest: Effort normal and breath sounds normal  No stridor  No respiratory distress  She has no wheezes  She has no rales  She exhibits no tenderness  Abdominal: Soft  Bowel sounds are normal  She exhibits no distension and no mass  There is no tenderness  Musculoskeletal: Normal range of motion  She exhibits no edema, tenderness or deformity  Lymphadenopathy:     She has no cervical adenopathy  Neurological: She is alert and oriented to person, place, and time  She has normal reflexes  She exhibits normal muscle tone  Skin: Skin is warm  No rash noted  She is not diaphoretic  No erythema  No pallor  Psychiatric: She has a normal mood and affect   Her behavior is normal  Judgment and thought content normal

## 2018-01-30 NOTE — PATIENT INSTRUCTIONS
Rhinosinusitis   WHAT YOU NEED TO KNOW:   Rhinosinusitis (RS) is inflammation of your nose and sinuses  It commonly begins as a virus, often as a common cold  Viruses usually last 7 to 10 days and do not need treatment  When the virus does not get better on its own, you may have bacterial RS  This means that bacteria have begun to grow inside your sinuses  Acute RS lasts less than 4 weeks  Chronic RS lasts 12 weeks or more  Recurrent RS is when you have 4 or more episodes of RS in one year  DISCHARGE INSTRUCTIONS:   Return to the emergency department if:   · Your eye and eyelid are red, swollen, and painful  · You cannot open your eye  · You have double vision or you cannot see  · Your eyeball bulges out or you cannot move your eye  · You are more sleepy than normal or you notice changes in your ability to think, move, or talk  · You have a stiff neck, a fever, or a bad headache  · You have swelling of your forehead or scalp  Contact your healthcare provider if:   · Your symptoms are worse or do not improve after 3 to 5 days of treatment  · You have questions or concerns about your condition or care  Medicines: You may need any of the following:  · Acetaminophen  decreases pain and fever  It is available without a doctor's order  Ask how much to take and how often to take it  Follow directions  Acetaminophen can cause liver damage if not taken correctly  · NSAIDs , such as ibuprofen, help decrease swelling, pain, and fever  This medicine is available with or without a doctor's order  NSAIDs can cause stomach bleeding or kidney problems in certain people  If you take blood thinner medicine, always ask your healthcare provider if NSAIDs are safe for you  Always read the medicine label and follow directions  · Nasal steroid sprays  decrease inflammation in your nose and sinuses  · Decongestants  reduce swelling and drain mucus in the nose and sinuses   They may help you breathe easier  · Antihistamines  dry mucus in the nose and relieve sneezing  · Antibiotics  treat a bacterial infection and may be needed if your symptoms do not improve or they get worse  · Take your medicine as directed  Contact your healthcare provider if you think your medicine is not helping or if you have side effects  Tell him or her if you are allergic to any medicine  Keep a list of the medicines, vitamins, and herbs you take  Include the amounts, and when and why you take them  Bring the list or the pill bottles to follow-up visits  Carry your medicine list with you in case of an emergency  Self-care:   · Rinse your sinuses  Use a sinus rinse device to rinse your nasal passages with a saline (salt water) solution  This will help thin the mucus in your nose and rinse away pollen and dirt  It will also help reduce swelling so you can breathe normally  Ask your healthcare provider how often to do this  · Breathe in steam   Heat a bowl of water until you see steam  Lean over the bowl and make a tent over your head with a large towel  Breathe deeply for about 20 minutes  Be careful not to get too close to the steam or burn yourself  Do this 3 times a day  You can also breathe deeply when you take a hot shower  · Sleep with your head elevated  Place an extra pillow under your head before you go to sleep to help your sinuses drain  · Drink liquids as directed  Ask your healthcare provider how much liquid to drink each day and which liquids are best for you  Liquids will thin the mucus in your nose and help it drain  Avoid drinks that contain alcohol or caffeine  · Do not smoke, and avoid secondhand smoke  Nicotine and other chemicals in cigarettes and cigars can make your symptoms worse  Ask your healthcare provider for information if you currently smoke and need help to quit  E-cigarettes or smokeless tobacco still contain nicotine   Talk to your healthcare provider before you use these products  Follow up with your healthcare provider as directed: Follow up if your symptoms are worse or not better after 3 to 5 days of treatment  Write down your questions so you remember to ask them during your visits  © 2017 [employer] Information is for End User's use only and may not be sold, redistributed or otherwise used for commercial purposes  All illustrations and images included in CareNotes® are the copyrighted property of A [employer] M [employer] Inc  or Jaiden Diaz  The above information is an  only  It is not intended as medical advice for individual conditions or treatments  Talk to your doctor, nurse or pharmacist before following any medical regimen to see if it is safe and effective for you

## 2018-01-31 PROBLEM — I65.23 BILATERAL CAROTID ARTERY STENOSIS: Status: ACTIVE | Noted: 2017-12-04

## 2018-01-31 PROBLEM — M15.9 GENERALIZED OSTEOARTHRITIS OF MULTIPLE SITES: Status: ACTIVE | Noted: 2017-01-09

## 2018-01-31 PROBLEM — G47.33 OBSTRUCTIVE SLEEP APNEA: Status: ACTIVE | Noted: 2017-11-01

## 2018-01-31 PROBLEM — R94.39 ABNORMAL STRESS ECG WITH TREADMILL: Status: ACTIVE | Noted: 2017-12-04

## 2018-02-01 ENCOUNTER — OFFICE VISIT (OUTPATIENT)
Dept: FAMILY MEDICINE CLINIC | Facility: CLINIC | Age: 78
End: 2018-02-01
Payer: MEDICARE

## 2018-02-01 VITALS
HEIGHT: 63 IN | TEMPERATURE: 97.6 F | BODY MASS INDEX: 35.08 KG/M2 | DIASTOLIC BLOOD PRESSURE: 60 MMHG | HEART RATE: 77 BPM | WEIGHT: 198 LBS | OXYGEN SATURATION: 95 % | RESPIRATION RATE: 18 BRPM | SYSTOLIC BLOOD PRESSURE: 116 MMHG

## 2018-02-01 DIAGNOSIS — R06.2 WHEEZING: Primary | ICD-10-CM

## 2018-02-01 PROCEDURE — 99213 OFFICE O/P EST LOW 20 MIN: CPT | Performed by: NURSE PRACTITIONER

## 2018-02-01 RX ORDER — ALBUTEROL SULFATE 2.5 MG/3ML
2.5 SOLUTION RESPIRATORY (INHALATION) EVERY 6 HOURS PRN
Qty: 75 ML | Refills: 0 | Status: SHIPPED | OUTPATIENT
Start: 2018-02-01 | End: 2018-07-09

## 2018-02-01 NOTE — PROGRESS NOTES
Assessment/Plan:    No problem-specific Assessment & Plan notes found for this encounter  Diagnoses and all orders for this visit:    Wheezing        1  Buy Mucinex over-the-counter and start that  2  Nebulized cell 4 times a day for the next couple of days  3  Comes seen me on Monday for recheck  4  Follow up if condition changes or worsens    Subjective:      Patient ID: Dorina Negrete is a 69 y o  female  71-year-old female presents with cough since Sunday  Dry hacking cough  Has been using Flonase  Not helping  Some wheezing             The following portions of the patient's history were reviewed and updated as appropriate: allergies and current medications  Review of Systems   Constitutional: Negative  HENT: Negative  Respiratory: Positive for cough and wheezing  Cardiovascular: Negative  Objective:     Physical Exam   Constitutional: She appears well-developed and well-nourished  HENT:   Head: Normocephalic and atraumatic  Right Ear: External ear normal    Left Ear: External ear normal    Clear postnasal drip   Cardiovascular: Normal rate, regular rhythm and normal heart sounds  Pulmonary/Chest: She has wheezes

## 2018-02-01 NOTE — PATIENT INSTRUCTIONS
Wheezing   WHAT YOU NEED TO KNOW:   Wheezing happens when air flows through a narrowed airway  Wheezing can happen when you breathe in, breathe out, or both  Wheezes may sound like a whistle, squeal, groan, or creak  Wheezes may also sound musical or high-pitched  Wheezing cannot be stopped by coughing  Asthma, allergies, or infection are the most common causes of wheezing  A foreign body, asthma, extra mucus, or smoking can also cause wheezing  DISCHARGE INSTRUCTIONS:   Call 911 if:   · You have sudden trouble breathing  · Your throat feels like it is swelling or feels tight  · You are dizzy, lightheaded, confused, or feel faint  · You have chest pain or tightness  Return to the emergency department if:   · You have shortness of breath  · You are coughing up blood  · You have chest pain  Contact your healthcare provider if:   · You have a fever  · Your wheezing does not get better or it gets worse  · You have questions or concerns about your condition or care  Medicines:   · Medicines  decrease inflammation, open airways, and make it easier to breathe  · Take your medicine as directed  Contact your healthcare provider if you think your medicine is not helping or if you have side effects  Tell him of her if you are allergic to any medicine  Keep a list of the medicines, vitamins, and herbs you take  Include the amounts, and when and why you take them  Bring the list or the pill bottles to follow-up visits  Carry your medicine list with you in case of an emergency  Follow up with your healthcare provider as directed: You may be referred to a specialist  Write down your questions so you remember to ask them during your visits  Manage your symptoms:   · Avoid allergy triggers , such as animals, grass, pollen, or dust     · Return to your usual activity as directed  You may need to limit certain activities until you follow up with your healthcare provider or your symptoms improve  Your child may need to avoid sports until his symptoms improve  · Take deep breaths and cough several times a day  This will decrease your risk for a lung infection and help decrease wheezing  Take a deep breath and hold it for as long as you can  Let the air out and then cough strongly  Deep breaths help open your airway  You may be given an incentive spirometer to help you take deep breaths  Put the plastic piece in your mouth and take a slow, deep breath, then let the air out and cough  Repeat these steps 10 times every hour  · Drink liquids as directed  You may need to drink more liquids than usual to thin your mucus and prevent dehydration  Ask how much liquid to drink each day and which liquids are best for you  © 2017 [facility] Information is for End User's use only and may not be sold, redistributed or otherwise used for commercial purposes  All illustrations and images included in CareNotes® are the copyrighted property of [employer]  or Jaiden Diaz  The above information is an  only  It is not intended as medical advice for individual conditions or treatments  Talk to your doctor, nurse or pharmacist before following any medical regimen to see if it is safe and effective for you

## 2018-02-05 ENCOUNTER — TRANSCRIBE ORDERS (OUTPATIENT)
Dept: ADMINISTRATIVE | Facility: HOSPITAL | Age: 78
End: 2018-02-05

## 2018-02-05 ENCOUNTER — HOSPITAL ENCOUNTER (OUTPATIENT)
Dept: RADIOLOGY | Facility: HOSPITAL | Age: 78
Discharge: HOME | End: 2018-02-05
Payer: MEDICARE

## 2018-02-05 ENCOUNTER — OFFICE VISIT (OUTPATIENT)
Dept: FAMILY MEDICINE CLINIC | Facility: CLINIC | Age: 78
End: 2018-02-05
Payer: MEDICARE

## 2018-02-05 VITALS
WEIGHT: 200 LBS | TEMPERATURE: 97.8 F | BODY MASS INDEX: 35.43 KG/M2 | RESPIRATION RATE: 16 BRPM | SYSTOLIC BLOOD PRESSURE: 136 MMHG | OXYGEN SATURATION: 96 % | DIASTOLIC BLOOD PRESSURE: 74 MMHG | HEART RATE: 74 BPM

## 2018-02-05 DIAGNOSIS — I10 ESSENTIAL (PRIMARY) HYPERTENSION: ICD-10-CM

## 2018-02-05 DIAGNOSIS — J40 BRONCHITIS: Primary | ICD-10-CM

## 2018-02-05 DIAGNOSIS — J40 BRONCHITIS: ICD-10-CM

## 2018-02-05 PROCEDURE — 96372 THER/PROPH/DIAG INJ SC/IM: CPT

## 2018-02-05 PROCEDURE — 71046 X-RAY EXAM CHEST 2 VIEWS: CPT

## 2018-02-05 PROCEDURE — 99213 OFFICE O/P EST LOW 20 MIN: CPT | Performed by: NURSE PRACTITIONER

## 2018-02-05 RX ORDER — METHYLPREDNISOLONE SODIUM SUCCINATE 125 MG/2ML
62.5 INJECTION, POWDER, LYOPHILIZED, FOR SOLUTION INTRAMUSCULAR; INTRAVENOUS ONCE
Status: COMPLETED | OUTPATIENT
Start: 2018-02-05 | End: 2018-02-05

## 2018-02-05 RX ORDER — PREDNISONE 10 MG/1
10 TABLET ORAL DAILY
Qty: 30 TABLET | Refills: 0 | Status: SHIPPED | OUTPATIENT
Start: 2018-02-05 | End: 2018-02-06 | Stop reason: SDUPTHER

## 2018-02-05 RX ADMIN — METHYLPREDNISOLONE SODIUM SUCCINATE 62.5 MG: 125 INJECTION, POWDER, LYOPHILIZED, FOR SOLUTION INTRAMUSCULAR; INTRAVENOUS at 09:16

## 2018-02-05 NOTE — PATIENT INSTRUCTIONS
Acute Bronchitis   WHAT YOU NEED TO KNOW:   Acute bronchitis is swelling and irritation in the air passages of your lungs  This irritation may cause you to cough or have other breathing problems  Acute bronchitis often starts because of another illness, such as a cold or the flu  The illness spreads from your nose and throat to your windpipe and airways  Bronchitis is often called a chest cold  Acute bronchitis lasts about 3 to 6 weeks and is usually not a serious illness  Your cough can last for several weeks  DISCHARGE INSTRUCTIONS:   Return to the emergency department if:   · You cough up blood  · Your lips or fingernails turn blue  · You feel like you are not getting enough air when you breathe  Contact your healthcare provider if:   · You have a fever  · Your breathing problems do not go away or get worse  · Your cough does not get better within 4 weeks  · You have questions or concerns about your condition or care  Self-care:   · Get more rest   Rest helps your body to heal  Slowly start to do more each day  Rest when you feel it is needed  · Avoid irritants in the air  Avoid chemicals, fumes, and dust  Wear a face mask if you must work around dust or fumes  Stay inside on days when air pollution levels are high  If you have allergies, stay inside when pollen counts are high  Do not use aerosol products, such as spray-on deodorant, bug spray, and hair spray  · Do not smoke or be around others who smoke  Nicotine and other chemicals in cigarettes and cigars damages the cilia that move mucus out of your lungs  Ask your healthcare provider for information if you currently smoke and need help to quit  E-cigarettes or smokeless tobacco still contain nicotine  Talk to your healthcare provider before you use these products  · Drink liquids as directed  Liquids help keep your air passages moist and help you cough up mucus   You may need to drink more liquids when you have acute bronchitis  Ask how much liquid to drink each day and which liquids are best for you  · Use a humidifier or vaporizer  Use a cool mist humidifier or a vaporizer to increase air moisture in your home  This may make it easier for you to breathe and help decrease your cough  Decrease risk for acute bronchitis:   · Get the vaccinations you need  Ask your healthcare provider if you should get vaccinated against the flu or pneumonia  · Prevent the spread of germs  You can decrease your risk of acute bronchitis and other illnesses by doing the following:     [facility] your hands often with soap and water  Carry germ-killing hand lotion or gel with you  You can use the lotion or gel to clean your hands when soap and water are not available  ¨ Do not touch your eyes, nose, or mouth unless you have washed your hands first     ¨ Always cover your mouth when you cough to prevent the spread of germs  It is best to cough into a tissue or your shirt sleeve instead of into your hand  Ask those around you cover their mouths when they cough  ¨ Try to avoid people who have a cold or the flu  If you are sick, stay away from others as much as possible  Medicines: Your healthcare provider may  give you any of the following:  · Ibuprofen or acetaminophen  are medicines that help lower your fever  They are available without a doctor's order  Ask your healthcare provider which medicine is right for you  Ask how much to take and how often to take it  Follow directions  These medicines can cause stomach bleeding if not taken correctly  Ibuprofen can cause kidney damage  Do not take ibuprofen if you have kidney disease, an ulcer, or allergies to aspirin  Acetaminophen can cause liver damage  Do not take more than 4,000 milligrams in 24 hours  · Decongestants  help loosen mucus in your lungs and make it easier to cough up  This can help you breathe easier  · Cough suppressants  decrease your urge to cough   If your cough produces mucus, do not take a cough suppressant unless your healthcare provider tells you to  Your healthcare provider may suggest that you take a cough suppressant at night so you can rest     · Inhalers  may be given  Your healthcare provider may give you one or more inhalers to help you breathe easier and cough less  An inhaler gives your medicine to open your airways  Ask your healthcare provider to show you how to use your inhaler correctly  · Take your medicine as directed  Contact your healthcare provider if you think your medicine is not helping or if you have side effects  Tell him of her if you are allergic to any medicine  Keep a list of the medicines, vitamins, and herbs you take  Include the amounts, and when and why you take them  Bring the list or the pill bottles to follow-up visits  Carry your medicine list with you in case of an emergency  Follow up with your healthcare provider as directed:  Write down questions you have so you will remember to ask them during your follow-up visits  © 2017 [address] Information is for End User's use only and may not be sold, redistributed or otherwise used for commercial purposes  All illustrations and images included in CareNotes® are the copyrighted property of [employer]  or Jaiden Diaz  The above information is an  only  It is not intended as medical advice for individual conditions or treatments  Talk to your doctor, nurse or pharmacist before following any medical regimen to see if it is safe and effective for you

## 2018-02-05 NOTE — PROGRESS NOTES
Assessment/Plan:     Diagnoses and all orders for this visit:    Bronchitis        1  You are going to continue with the nebulizer treatments  2  Go for chest x-ray  3  Follow up in 3 days for recheck  4  Follow up if condition changes or worsens  5  Start oral steroids tomorrow    Subjective:      Patient ID: Bernarda Bennett is a 69 y o  female  A 64-year-old female presents with follow-up for wheezing from last week  Does not feel any better  Cough much worse  Denies fever  Nebulizing self 3 times a day with albuterol  Some relief with the wheezing  Patient has been seen 2 times for this and would like antibiotic this time  Denies any chest pain or palpitations  The following portions of the patient's history were reviewed and updated as appropriate: allergies, current medications, past family history and past medical history  Review of Systems   Constitutional: Negative  HENT: Negative  Respiratory: Positive for cough and wheezing  Cardiovascular: Negative  Objective:     Physical Exam   Constitutional: She appears well-developed and well-nourished  HENT:   Head: Normocephalic and atraumatic  Right Ear: External ear normal    Left Ear: External ear normal    Nose: Nose normal    Mouth/Throat: Oropharynx is clear and moist    Neck: Normal range of motion  Neck supple  Cardiovascular: Normal rate, regular rhythm and normal heart sounds  Pulmonary/Chest: She has wheezes

## 2018-02-06 ENCOUNTER — TELEPHONE (OUTPATIENT)
Dept: FAMILY MEDICINE CLINIC | Facility: CLINIC | Age: 78
End: 2018-02-06

## 2018-02-06 DIAGNOSIS — J40 BRONCHITIS: ICD-10-CM

## 2018-02-06 DIAGNOSIS — R91.8 LUNG INFILTRATE: Primary | ICD-10-CM

## 2018-02-06 PROBLEM — J18.9 PNEUMONIA: Status: ACTIVE | Noted: 2018-02-06

## 2018-02-06 PROBLEM — J18.9 PNEUMONIA: Status: RESOLVED | Noted: 2018-02-06 | Resolved: 2018-02-06

## 2018-02-06 RX ORDER — CLARITHROMYCIN 500 MG/1
500 TABLET, COATED ORAL EVERY 12 HOURS SCHEDULED
Qty: 10 TABLET | Refills: 0 | Status: SHIPPED | OUTPATIENT
Start: 2018-02-06 | End: 2018-02-11

## 2018-02-06 RX ORDER — PREDNISONE 10 MG/1
10 TABLET ORAL DAILY
Qty: 30 TABLET | Refills: 0 | Status: SHIPPED | OUTPATIENT
Start: 2018-02-06 | End: 2018-03-21

## 2018-02-06 NOTE — TELEPHONE ENCOUNTER
Spoke with patient and inform patient of the infiltrates  Starting antibiotic  Patient follow up on Friday

## 2018-02-09 ENCOUNTER — OFFICE VISIT (OUTPATIENT)
Dept: FAMILY MEDICINE CLINIC | Facility: CLINIC | Age: 78
End: 2018-02-09
Payer: MEDICARE

## 2018-02-09 VITALS
SYSTOLIC BLOOD PRESSURE: 120 MMHG | HEIGHT: 62 IN | TEMPERATURE: 98 F | OXYGEN SATURATION: 99 % | WEIGHT: 202 LBS | DIASTOLIC BLOOD PRESSURE: 70 MMHG | HEART RATE: 60 BPM | BODY MASS INDEX: 37.17 KG/M2

## 2018-02-09 DIAGNOSIS — J40 BRONCHITIS: Primary | ICD-10-CM

## 2018-02-09 PROCEDURE — 99213 OFFICE O/P EST LOW 20 MIN: CPT | Performed by: NURSE PRACTITIONER

## 2018-02-09 NOTE — PROGRESS NOTES
Assessment/Plan:         Diagnoses and all orders for this visit:    Bronchitis        1  Continue with tx plan  2 make sure you stay hydrated  3  F/u if condition changes/worsens      Vitals:    02/09/18 0851   BP:    Pulse: 60   Temp:    SpO2: 99%       Subjective:      Patient ID: Michael Posadas is a 69 y o  female  79-years-old female presents for follow-up bronchitis  Is taking medication as prescribed  Feels a little bit better  Denies fever  The following portions of the patient's history were reviewed and updated as appropriate: allergies and current medications  Review of Systems   Constitutional: Negative  HENT: Negative  Respiratory: Positive for cough  Cardiovascular: Negative  Objective:     Physical Exam   Constitutional: She appears well-developed and well-nourished  HENT:   Head: Normocephalic  Right Ear: External ear normal    Left Ear: External ear normal    Mouth/Throat: Oropharynx is clear and moist    Cardiovascular: Normal rate, regular rhythm and normal heart sounds      Pulmonary/Chest: Effort normal and breath sounds normal

## 2018-02-09 NOTE — PATIENT INSTRUCTIONS

## 2018-02-12 ENCOUNTER — TELEPHONE (OUTPATIENT)
Dept: FAMILY MEDICINE CLINIC | Facility: CLINIC | Age: 78
End: 2018-02-12

## 2018-02-12 DIAGNOSIS — R91.8 LUNG INFILTRATE: ICD-10-CM

## 2018-02-12 NOTE — TELEPHONE ENCOUNTER
Pt is still not feeling better and would like to know if she can have a refill of the Clarithromycin 500mg sent to [facility]? She sts she will schedule another appt if she is still not better after that  Thank you   CE

## 2018-02-12 NOTE — TELEPHONE ENCOUNTER
Hi   Patient was feeling better as per our last appointment  Please have her follow up with her primary care physician if she is not getting any better    Thank you

## 2018-02-13 RX ORDER — CLARITHROMYCIN 500 MG/1
TABLET, COATED ORAL
Qty: 10 TABLET | Refills: 0 | OUTPATIENT
Start: 2018-02-13

## 2018-03-12 ENCOUNTER — TELEPHONE (OUTPATIENT)
Dept: PULMONOLOGY | Facility: MEDICAL CENTER | Age: 78
End: 2018-03-12

## 2018-03-12 NOTE — TELEPHONE ENCOUNTER
Patient said that she is getting very dry nasal passages and had some bleeding  She believes its from her machine  Could you give her a call after 1pm on 3/13 when you are back?

## 2018-03-20 ENCOUNTER — OFFICE VISIT (OUTPATIENT)
Dept: PULMONOLOGY | Facility: MEDICAL CENTER | Age: 78
End: 2018-03-20
Payer: MEDICARE

## 2018-03-20 VITALS
OXYGEN SATURATION: 96 % | WEIGHT: 203 LBS | DIASTOLIC BLOOD PRESSURE: 64 MMHG | SYSTOLIC BLOOD PRESSURE: 118 MMHG | HEART RATE: 61 BPM | HEIGHT: 63 IN | RESPIRATION RATE: 12 BRPM | TEMPERATURE: 98.7 F | BODY MASS INDEX: 35.97 KG/M2

## 2018-03-20 DIAGNOSIS — R09.82 POST-NASAL DRIP: Primary | ICD-10-CM

## 2018-03-20 DIAGNOSIS — R91.8 PULMONARY INFILTRATE IN LEFT LUNG ON CXR: ICD-10-CM

## 2018-03-20 DIAGNOSIS — G47.33 OBSTRUCTIVE SLEEP APNEA: ICD-10-CM

## 2018-03-20 PROCEDURE — 99214 OFFICE O/P EST MOD 30 MIN: CPT | Performed by: INTERNAL MEDICINE

## 2018-03-20 RX ORDER — FLUTICASONE PROPIONATE 50 MCG
2 SPRAY, SUSPENSION (ML) NASAL DAILY
Qty: 16 G | Refills: 0 | Status: SHIPPED | OUTPATIENT
Start: 2018-03-20 | End: 2018-09-21 | Stop reason: SDUPTHER

## 2018-03-20 RX ORDER — SODIUM CHLORIDE/ALOE VERA
1 GEL (GRAM) NASAL
COMMUNITY
End: 2021-09-09

## 2018-03-20 NOTE — LETTER
March 21, 2018     John Noriega MD  [facility]  [address]    Patient: Radha Colunga   YOB: 1940   Date of Visit: 3/20/2018       Dear Dr Izzy Fong: Thank you for referring Radha Colunga to me for evaluation  Below are my notes for this consultation  If you have questions, please do not hesitate to call me  I look forward to following your patient along with you  Sincerely,        Enmanuel Souza MD        CC: No Recipients  Enmanuel Souza MD  3/21/2018  7:44 AM  Sign at close encounter  Assessment/Plan:     Problem List Items Addressed This Visit        Respiratory    Obstructive sleep apnea     Continued compliance is encouraged  She can try to use the nasal pillows again and assess for comfort but on the nasal mask itself her leak is minimal at 2 secs  Should she develop nasal congestion and sinus difficulty again consider ENT eval to assess for structural     Periodic mask, tubing, filter replacement every 1-3 months is advised and periodic  approximately every year is advised  Uses CPAP during all sleep periods is discussed  Avoidance of sedatives, supine sleep, alcohol, narcotics in the setting of untreated sleep apnea is discussed  Absolute avoidance of driving while drowsy is also discussed  Avoid weight gain/encourage weight loss  Other    Pulmonary infiltrate in left lung on CXR     During her recent illness she had an xray done that is viewed by me  ? Left infiltrate  Check repeat to assess resolution  Relevant Orders    XR chest pa & lateral    Post-nasal drip - Primary     Continue with Flonase  Nasal saline to be used multiple times per day to clear nasal and sinus passages  Relevant Medications    fluticasone (FLONASE) 50 mcg/act nasal spray            Return in about 3 months (around 6/20/2018)  All questions are answered to the patient's satisfaction and understanding    She verbalizes understanding  She is encouraged to call with any further questions or concerns  Portions of the record may have been created with voice recognition software  Occasional wrong word or "sound a like" substitutions may have occurred due to the inherent limitations of voice recognition software  Read the chart carefully and recognize, using context, where substitutions have occurred  ______________________________________________________________________    Chief Complaint:   Chief Complaint   Patient presents with    Follow-up     Pt had sinus infections  She went to pcp for 3 times andres  wk and was told to take mucinex   Abnormal Imaging Result     cxr    Cough     every now and then    Sleep Apnea     not sure if machine is causing symtpoms         Patient ID: Trevor Sanchez is a 69 y o  y o  female has a past medical history of Anxiety; Bleeding from varicose vein; Environmental allergies; Fracture of radius; Obesity; Scoliosis; Vaginitis; and Vulvovaginitis  3/20/2018  Patient presents today for follow-up visit  She was recently seen in February by her primary care physician for chest congestion and what appeared to be a viral illness  Most recently she is having sinus congestion which is improving  No fevers  She was given antibiotics for 5 days last month  She is using Flonase and nasal saline gel  She was nose and epistaxis however this is improving  She is using CPAP every night for the entire duration of her sleep time and has less daytime sleepiness with more daytime energy  She did have her mask changed to nasal pillows but she is not sure if this caused her symptoms of sinus difficulty  She is also having difficulty with vision in her right eye  Shortness of breath is stable  No GERD      Review of Systems   Constitutional: Negative for activity change, appetite change and unexpected weight change  HENT: Positive for sinus pressure   Negative for congestion, dental problem, sinus pain and voice change  Eyes: Negative for visual disturbance  Respiratory: Negative for apnea  Cardiovascular: Negative for palpitations and leg swelling  Gastrointestinal: Negative for abdominal pain, diarrhea, nausea and vomiting  Genitourinary: Negative for difficulty urinating  Musculoskeletal: Negative for arthralgias  Skin: Negative for wound  Allergic/Immunologic: Negative for food allergies  Neurological: Negative for dizziness and light-headedness  Hematological: Negative for adenopathy  Psychiatric/Behavioral: Negative for agitation, behavioral problems and confusion  Smoking history: She reports that she has never smoked  She does not have any smokeless tobacco history on file  The following portions of the patient's history were reviewed and updated as appropriate: allergies, current medications, past family history, past medical history, past social history, past surgical history and problem list     There is no immunization history for the selected administration types on file for this patient  Current Outpatient Prescriptions   Medication Sig Dispense Refill    albuterol (2 5 mg/3 mL) 0 083 % nebulizer solution Take 3 mL (2 5 mg total) by nebulization every 6 (six) hours as needed for wheezing 75 mL 0    aspirin 81 mg chewable tablet Chew 1 tablet daily      b complex vitamins tablet Take 1 tablet by mouth daily      fluticasone (FLONASE) 50 mcg/act nasal spray 2 sprays into each nostril daily 16 g 0    hydrochlorothiazide (HYDRODIURIL) 12 5 mg tablet Take 1 tablet by mouth Daily      Naproxen Sodium (ALEVE) 220 MG CAPS Take by mouth      nystatin-triamcinolone (MYCOLOG-II) cream Apply topically      potassium chloride (MICRO-K) 10 MEQ CR capsule Take by mouth      sodium chloride (AYR SALINE NASAL) nasal gel 1 application into each nostril every hour as needed       No current facility-administered medications for this visit        Allergies: Penicillins and Sulfa antibiotics    Objective:  Vitals:    03/20/18 0806   BP: 118/64   BP Location: Left arm   Patient Position: Sitting   Cuff Size: Extra-Large   Pulse: 61   Resp: 12   Temp: 98 7 °F (37 1 °C)   TempSrc: Oral   SpO2: 96%   Weight: 92 1 kg (203 lb)   Height: 5' 3" (1 6 m)   Oxygen Therapy  SpO2: 96 %    Wt Readings from Last 3 Encounters:   03/20/18 92 1 kg (203 lb)   02/09/18 91 6 kg (202 lb)   02/05/18 90 7 kg (200 lb)     Body mass index is 35 96 kg/m²  Physical Exam   Constitutional: She is oriented to person, place, and time  She appears well-developed and well-nourished  No distress  HENT:   Head: Normocephalic and atraumatic  Mouth/Throat: Oropharynx is clear and moist  No oropharyngeal exudate  Eyes: EOM are normal  Pupils are equal, round, and reactive to light  Neck: Normal range of motion  Neck supple  No tracheal deviation present  No thyromegaly present  Cardiovascular: Normal rate and regular rhythm  No murmur heard  Pulmonary/Chest: Effort normal and breath sounds normal  No respiratory distress  She has no wheezes  She has no rales  She exhibits no tenderness  Abdominal: Soft  Bowel sounds are normal  She exhibits no distension  There is no tenderness  Musculoskeletal: Normal range of motion  She exhibits no edema  Lymphadenopathy:     She has no cervical adenopathy  Neurological: She is alert and oriented to person, place, and time  No cranial nerve deficit  Skin: Skin is warm and dry  She is not diaphoretic  Psychiatric: She has a normal mood and affect  Her behavior is normal    Vitals reviewed  Lab Review:   not applicable    Diagnostics:  I have personally reviewed pertinent reports     and I have personally reviewed pertinent films in PACS  Chest x-ray: ? Left infiltrate  Compliance data reviewed 2/17/18-3/18/18- [facility] 3 8/hr on 7 cm water

## 2018-03-20 NOTE — PROGRESS NOTES
Assessment/Plan:     Problem List Items Addressed This Visit        Respiratory    Obstructive sleep apnea     Continued compliance is encouraged  She can try to use the nasal pillows again and assess for comfort but on the nasal mask itself her leak is minimal at 2 secs  Should she develop nasal congestion and sinus difficulty again consider ENT eval to assess for structural     Periodic mask, tubing, filter replacement every 1-3 months is advised and periodic  approximately every year is advised  Uses CPAP during all sleep periods is discussed  Avoidance of sedatives, supine sleep, alcohol, narcotics in the setting of untreated sleep apnea is discussed  Absolute avoidance of driving while drowsy is also discussed  Avoid weight gain/encourage weight loss  Other    Pulmonary infiltrate in left lung on CXR     During her recent illness she had an xray done that is viewed by me  ? Left infiltrate  Check repeat to assess resolution  Relevant Orders    XR chest pa & lateral    Post-nasal drip - Primary     Continue with Flonase  Nasal saline to be used multiple times per day to clear nasal and sinus passages  Relevant Medications    fluticasone (FLONASE) 50 mcg/act nasal spray            Return in about 3 months (around 6/20/2018)  All questions are answered to the patient's satisfaction and understanding  She verbalizes understanding  She is encouraged to call with any further questions or concerns  Portions of the record may have been created with voice recognition software  Occasional wrong word or "sound a like" substitutions may have occurred due to the inherent limitations of voice recognition software  Read the chart carefully and recognize, using context, where substitutions have occurred      ______________________________________________________________________    Chief Complaint:   Chief Complaint   Patient presents with    Follow-up Pt had sinus infections  She went to pcp for 3 times andres  wk and was told to take mucinex   Abnormal Imaging Result     cxr    Cough     every now and then    Sleep Apnea     not sure if machine is causing symtpoms         Patient ID: Shari Cyr is a 69 y o  y o  female has a past medical history of Anxiety; Bleeding from varicose vein; Environmental allergies; Fracture of radius; Obesity; Scoliosis; Vaginitis; and Vulvovaginitis  3/20/2018  Patient presents today for follow-up visit  She was recently seen in February by her primary care physician for chest congestion and what appeared to be a viral illness  Most recently she is having sinus congestion which is improving  No fevers  She was given antibiotics for 5 days last month  She is using Flonase and nasal saline gel  She was nose and epistaxis however this is improving  She is using CPAP every night for the entire duration of her sleep time and has less daytime sleepiness with more daytime energy  She did have her mask changed to nasal pillows but she is not sure if this caused her symptoms of sinus difficulty  She is also having difficulty with vision in her right eye  Shortness of breath is stable  No GERD      Review of Systems   Constitutional: Negative for activity change, appetite change and unexpected weight change  HENT: Positive for sinus pressure  Negative for congestion, dental problem, sinus pain and voice change  Eyes: Negative for visual disturbance  Respiratory: Negative for apnea  Cardiovascular: Negative for palpitations and leg swelling  Gastrointestinal: Negative for abdominal pain, diarrhea, nausea and vomiting  Genitourinary: Negative for difficulty urinating  Musculoskeletal: Negative for arthralgias  Skin: Negative for wound  Allergic/Immunologic: Negative for food allergies  Neurological: Negative for dizziness and light-headedness  Hematological: Negative for adenopathy     Psychiatric/Behavioral: Negative for agitation, behavioral problems and confusion  Smoking history: She reports that she has never smoked  She does not have any smokeless tobacco history on file  The following portions of the patient's history were reviewed and updated as appropriate: allergies, current medications, past family history, past medical history, past social history, past surgical history and problem list     There is no immunization history for the selected administration types on file for this patient  Current Outpatient Prescriptions   Medication Sig Dispense Refill    albuterol (2 5 mg/3 mL) 0 083 % nebulizer solution Take 3 mL (2 5 mg total) by nebulization every 6 (six) hours as needed for wheezing 75 mL 0    aspirin 81 mg chewable tablet Chew 1 tablet daily      b complex vitamins tablet Take 1 tablet by mouth daily      fluticasone (FLONASE) 50 mcg/act nasal spray 2 sprays into each nostril daily 16 g 0    hydrochlorothiazide (HYDRODIURIL) 12 5 mg tablet Take 1 tablet by mouth Daily      Naproxen Sodium (ALEVE) 220 MG CAPS Take by mouth      nystatin-triamcinolone (MYCOLOG-II) cream Apply topically      potassium chloride (MICRO-K) 10 MEQ CR capsule Take by mouth      sodium chloride (AYR SALINE NASAL) nasal gel 1 application into each nostril every hour as needed       No current facility-administered medications for this visit  Allergies: Penicillins and Sulfa antibiotics    Objective:  Vitals:    03/20/18 0806   BP: 118/64   BP Location: Left arm   Patient Position: Sitting   Cuff Size: Extra-Large   Pulse: 61   Resp: 12   Temp: 98 7 °F (37 1 °C)   TempSrc: Oral   SpO2: 96%   Weight: 92 1 kg (203 lb)   Height: 5' 3" (1 6 m)   Oxygen Therapy  SpO2: 96 %    Wt Readings from Last 3 Encounters:   03/20/18 92 1 kg (203 lb)   02/09/18 91 6 kg (202 lb)   02/05/18 90 7 kg (200 lb)     Body mass index is 35 96 kg/m²  Physical Exam   Constitutional: She is oriented to person, place, and time   She appears well-developed and well-nourished  No distress  HENT:   Head: Normocephalic and atraumatic  Mouth/Throat: Oropharynx is clear and moist  No oropharyngeal exudate  Eyes: EOM are normal  Pupils are equal, round, and reactive to light  Neck: Normal range of motion  Neck supple  No tracheal deviation present  No thyromegaly present  Cardiovascular: Normal rate and regular rhythm  No murmur heard  Pulmonary/Chest: Effort normal and breath sounds normal  No respiratory distress  She has no wheezes  She has no rales  She exhibits no tenderness  Abdominal: Soft  Bowel sounds are normal  She exhibits no distension  There is no tenderness  Musculoskeletal: Normal range of motion  She exhibits no edema  Lymphadenopathy:     She has no cervical adenopathy  Neurological: She is alert and oriented to person, place, and time  No cranial nerve deficit  Skin: Skin is warm and dry  She is not diaphoretic  Psychiatric: She has a normal mood and affect  Her behavior is normal    Vitals reviewed  Lab Review:   not applicable    Diagnostics:  I have personally reviewed pertinent reports     and I have personally reviewed pertinent films in PACS  Chest x-ray: ? Left infiltrate  Compliance data reviewed 2/17/18-3/18/18- AHI 3 8/hr on 7 cm water

## 2018-03-21 PROBLEM — R91.8 LUNG INFILTRATE: Status: RESOLVED | Noted: 2018-02-06 | Resolved: 2018-03-21

## 2018-03-21 PROBLEM — R09.82 POST-NASAL DRIP: Status: ACTIVE | Noted: 2018-03-21

## 2018-03-21 PROBLEM — R91.8 PULMONARY INFILTRATE IN LEFT LUNG ON CXR: Status: ACTIVE | Noted: 2018-03-21

## 2018-03-21 PROBLEM — R06.2 WHEEZING: Status: RESOLVED | Noted: 2018-02-01 | Resolved: 2018-03-21

## 2018-03-21 PROBLEM — J40 BRONCHITIS: Status: RESOLVED | Noted: 2018-02-05 | Resolved: 2018-03-21

## 2018-03-21 NOTE — ASSESSMENT & PLAN NOTE
Continued compliance is encouraged  She can try to use the nasal pillows again and assess for comfort but on the nasal mask itself her leak is minimal at 2 secs  Should she develop nasal congestion and sinus difficulty again consider ENT eval to assess for structural     Periodic mask, tubing, filter replacement every 1-3 months is advised and periodic  approximately every year is advised  Uses CPAP during all sleep periods is discussed  Avoidance of sedatives, supine sleep, alcohol, narcotics in the setting of untreated sleep apnea is discussed  Absolute avoidance of driving while drowsy is also discussed  Avoid weight gain/encourage weight loss

## 2018-03-21 NOTE — ASSESSMENT & PLAN NOTE
During her recent illness she had an xray done that is viewed by me  ? Left infiltrate  Check repeat to assess resolution

## 2018-03-21 NOTE — ASSESSMENT & PLAN NOTE
Continue with Flonase  Nasal saline to be used multiple times per day to clear nasal and sinus passages

## 2018-04-16 ENCOUNTER — HOSPITAL ENCOUNTER (OUTPATIENT)
Dept: RADIOLOGY | Facility: HOSPITAL | Age: 78
Discharge: HOME | End: 2018-04-16
Attending: INTERNAL MEDICINE
Payer: MEDICARE

## 2018-04-16 ENCOUNTER — TRANSCRIBE ORDERS (OUTPATIENT)
Dept: ADMINISTRATIVE | Facility: HOSPITAL | Age: 78
End: 2018-04-16

## 2018-04-16 DIAGNOSIS — R91.8 PULMONARY INFILTRATE IN LEFT LUNG ON CXR: ICD-10-CM

## 2018-04-16 PROCEDURE — 71046 X-RAY EXAM CHEST 2 VIEWS: CPT

## 2018-04-20 ENCOUNTER — OFFICE VISIT (OUTPATIENT)
Dept: FAMILY MEDICINE CLINIC | Facility: CLINIC | Age: 78
End: 2018-04-20
Payer: MEDICARE

## 2018-04-20 VITALS
WEIGHT: 203 LBS | HEART RATE: 94 BPM | BODY MASS INDEX: 35.96 KG/M2 | RESPIRATION RATE: 22 BRPM | OXYGEN SATURATION: 94 % | SYSTOLIC BLOOD PRESSURE: 132 MMHG | TEMPERATURE: 99.2 F | DIASTOLIC BLOOD PRESSURE: 52 MMHG

## 2018-04-20 DIAGNOSIS — J06.9 VIRAL UPPER RESPIRATORY TRACT INFECTION: ICD-10-CM

## 2018-04-20 DIAGNOSIS — R53.83 FATIGUE, UNSPECIFIED TYPE: ICD-10-CM

## 2018-04-20 DIAGNOSIS — J30.2 SEASONAL ALLERGIC RHINITIS, UNSPECIFIED TRIGGER: ICD-10-CM

## 2018-04-20 DIAGNOSIS — R05.8 DRY COUGH: Primary | ICD-10-CM

## 2018-04-20 PROCEDURE — 99213 OFFICE O/P EST LOW 20 MIN: CPT | Performed by: FAMILY MEDICINE

## 2018-04-23 ENCOUNTER — OFFICE VISIT (OUTPATIENT)
Dept: PULMONOLOGY | Facility: MEDICAL CENTER | Age: 78
End: 2018-04-23
Payer: MEDICARE

## 2018-04-23 VITALS
HEART RATE: 78 BPM | DIASTOLIC BLOOD PRESSURE: 64 MMHG | SYSTOLIC BLOOD PRESSURE: 118 MMHG | OXYGEN SATURATION: 95 % | TEMPERATURE: 98.6 F

## 2018-04-23 DIAGNOSIS — R05.9 COUGH: ICD-10-CM

## 2018-04-23 DIAGNOSIS — J20.9 ACUTE BRONCHITIS, UNSPECIFIED ORGANISM: Primary | ICD-10-CM

## 2018-04-23 DIAGNOSIS — G47.33 OBSTRUCTIVE SLEEP APNEA: ICD-10-CM

## 2018-04-23 PROBLEM — J20.8 ACUTE BRONCHITIS DUE TO OTHER SPECIFIED ORGANISMS: Status: ACTIVE | Noted: 2018-04-23

## 2018-04-23 PROCEDURE — 99213 OFFICE O/P EST LOW 20 MIN: CPT | Performed by: INTERNAL MEDICINE

## 2018-04-23 RX ORDER — HYDROCODONE POLISTIREX AND CHLORPHENIRAMINE POLISTIREX 10; 8 MG/5ML; MG/5ML
5 SUSPENSION, EXTENDED RELEASE ORAL EVERY 12 HOURS PRN
Qty: 120 ML | Refills: 0 | Status: SHIPPED | OUTPATIENT
Start: 2018-04-23 | End: 2018-06-06 | Stop reason: SDUPTHER

## 2018-04-23 RX ORDER — HYDROCODONE POLISTIREX AND CHLORPHENIRAMINE POLISTIREX 10; 8 MG/5ML; MG/5ML
5 SUSPENSION, EXTENDED RELEASE ORAL EVERY 12 HOURS PRN
Qty: 120 ML | Refills: 0 | Status: SHIPPED | OUTPATIENT
Start: 2018-04-23 | End: 2018-04-23 | Stop reason: SDUPTHER

## 2018-04-23 RX ORDER — CEFUROXIME AXETIL 500 MG/1
500 TABLET ORAL EVERY 12 HOURS SCHEDULED
Qty: 14 TABLET | Refills: 0 | Status: SHIPPED | OUTPATIENT
Start: 2018-04-23 | End: 2018-04-30

## 2018-04-23 RX ORDER — BENZONATATE 100 MG/1
100 CAPSULE ORAL 3 TIMES DAILY PRN
Qty: 30 CAPSULE | Refills: 0 | Status: SHIPPED | OUTPATIENT
Start: 2018-04-23 | End: 2018-07-09

## 2018-04-23 NOTE — PATIENT INSTRUCTIONS
Take Ceftin 500 mg 1 tablet twice a day for 7 days    Can take Tussionex 5 mL or 1 tsp full at bedtime as needed for cough    Can use benzonatate 100 mg up to 3 times a day as needed for cough    Call us if you're not improving

## 2018-04-23 NOTE — ASSESSMENT & PLAN NOTE
Acute bronchitis  She is not having cough productive for yellow green mucus  Early this year she did have evidence of viral URI which partially improved  She has become more symptomatic and cough is worsening  Chest x-ray done April 16 was reviewed the patient and no infiltrates  I did prescribe Ceftin 500 mg twice a day for 7 days      She will contact our office if there is no improvement with this medication

## 2018-04-23 NOTE — PROGRESS NOTES
Assessment/Plan:     Problem List Items Addressed This Visit        Respiratory    Obstructive sleep apnea     Moderate to severe GEORGINA with overall AHI of 29 3 and oxygen karen 63% on diagnostic study done October 2017  She has not been AV uses CPAP the last few days because of her cough  When she gets over this acute bronchitis she will resume her CPAP therapy and try to lose weight  She is on a CPAP pressure of 7 cm H2O  Acute bronchitis - Primary     Acute bronchitis  She is not having cough productive for yellow green mucus  Early this year she did have evidence of viral URI which partially improved  She has become more symptomatic and cough is worsening  Chest x-ray done April 16 was reviewed the patient and no infiltrates  I did prescribe Ceftin 500 mg twice a day for 7 days  She will contact our office if there is no improvement with this medication         Relevant Medications    cefuroxime (CEFTIN) 500 mg tablet    benzonatate (TESSALON PERLES) 100 mg capsule    hydrocodone-chlorpheniramine polistirex (TUSSIONEX) 10-8 mg/5 mL ER suspension       Other    Cough     I prescribed Tussionex she can take 5 mL at bedtime for her cough and during the day she can use benzonatate 100 mg 3 times a day as needed         Relevant Medications    benzonatate (TESSALON PERLES) 100 mg capsule    hydrocodone-chlorpheniramine polistirex (TUSSIONEX) 10-8 mg/5 mL ER suspension            No Follow-up on file  All questions are answered to the patient's satisfaction and understanding  She verbalizes understanding  She is encouraged to call with any further questions or concerns  Portions of the record may have been created with voice recognition software  Occasional wrong word or "sound a like" substitutions may have occurred due to the inherent limitations of voice recognition software    Read the chart carefully and recognize, using context, where substitutions have occurred  ______________________________________________________________________    Chief Complaint:   Chief Complaint   Patient presents with    Follow-up     pt has not been feeling weel since friday  She went to pcp and was told to take otc meds    Shortness of Breath      occurs at night    Cough     bring up dark yellow green like mucus    Dizziness       Patient ID: [place] is a 69 y o  y o  female has a past medical history of Anxiety; Bleeding from varicose vein; Environmental allergies; Fracture of radius; Obesity; Scoliosis; Vaginitis; and Vulvovaginitis  4/23/2018  HPI     Angiocath complains of a persistent cough for the last couple weeks  She is now bringing up some yellow and green mucus  No fever or chills  She did have a viral upper respiratory tract infection back in late January 2018  She had some associated wheezing  Her cough got worse and she was seen again in the office on February 5th and a chest x-ray was ordered  Chest x-ray done then showed a questionable left lower lobe vague infiltrate  Delia White She was also given tapering dose of prednisone for wheezing  Because of this questionable left lower lobe pneumonia she was also given a prescription for clarithromycin 500 mg b i d  for 5 days on 02/12/2018  Her cough only improve somewhat  Lower symptoms have worsened and she has green yellow mucus complain some intermittent wheezing  She has a minimal exertional dyspnea  No night sweats or fevers  She does have severe obstructive sleep apnea but because of her cough is not been able to use her CPAP  The cough does keep her awake at night  She also has a history of hypertension, Moeller's esophagitis, allergic rhinitis and osteoarthritis  Her initials CPAP titration study was done November 2017 and she required a CPAP pressure of 7 to eliminate her respiratory events    Initial diagnostic sleep study in October 2017 showed moderate to severe GEORGINA with an AHI of 29 an oxygen karen 63%      Review of Systems   Constitutional:        Complains of cough productive of some yellow mucus  General malaise  No fever or chills  No pleuritic chest pain   HENT: Positive for congestion  Negative for sinus pain  Eyes: Negative for redness  Respiratory: Positive for cough  Has some minimal exertional shortness of breath   Cardiovascular: Negative for chest pain and leg swelling  Gastrointestinal: Negative for abdominal distention and diarrhea  Endocrine: Negative for polyphagia and polyuria  Genitourinary: Negative for dysuria  Musculoskeletal: Negative for joint swelling and myalgias  Skin: Negative for rash  Neurological: Negative for dizziness  Psychiatric/Behavioral: Negative for confusion  Smoking history: She reports that she has never smoked  She has never used smokeless tobacco     The following portions of the patient's history were reviewed and updated as appropriate: allergies, current medications, past family history, past medical history, past social history, past surgical history and problem list     There is no immunization history for the selected administration types on file for this patient    Current Outpatient Prescriptions   Medication Sig Dispense Refill    albuterol (2 5 mg/3 mL) 0 083 % nebulizer solution Take 3 mL (2 5 mg total) by nebulization every 6 (six) hours as needed for wheezing 75 mL 0    aspirin 81 mg chewable tablet Chew 1 tablet daily      b complex vitamins tablet Take 1 tablet by mouth daily      fluticasone (FLONASE) 50 mcg/act nasal spray 2 sprays into each nostril daily 16 g 0    hydrochlorothiazide (HYDRODIURIL) 12 5 mg tablet Take 1 tablet by mouth Daily      Naproxen Sodium (ALEVE) 220 MG CAPS Take by mouth      nystatin-triamcinolone (MYCOLOG-II) cream Apply topically      potassium chloride (MICRO-K) 10 MEQ CR capsule Take by mouth      sodium chloride (AYR SALINE NASAL) nasal gel 1 application into each nostril every hour as needed      benzonatate (TESSALON PERLES) 100 mg capsule Take 1 capsule (100 mg total) by mouth 3 (three) times a day as needed for cough 30 capsule 0    cefuroxime (CEFTIN) 500 mg tablet Take 1 tablet (500 mg total) by mouth every 12 (twelve) hours for 7 days 14 tablet 0    hydrocodone-chlorpheniramine polistirex (TUSSIONEX) 10-8 mg/5 mL ER suspension Take 5 mL by mouth every 12 (twelve) hours as needed for cough Max Daily Amount: 10 mL 120 mL 0     No current facility-administered medications for this visit  Allergies: Penicillins and Sulfa antibiotics    Objective:  Vitals:    04/23/18 0831 04/23/18 0902   BP:  118/64   Pulse: 78    Temp:  98 6 °F (37 °C)   SpO2: 95%    Oxygen Therapy  SpO2: 95 %    Wt Readings from Last 3 Encounters:   04/20/18 92 1 kg (203 lb)   03/20/18 92 1 kg (203 lb)   02/09/18 91 6 kg (202 lb)     There is no height or weight on file to calculate BMI  Physical Exam   Constitutional: She is oriented to person, place, and time  Awake, alert, no distress  No conversational dyspnea   HENT:   Head: Normocephalic  Nose: Nose normal    Mouth/Throat: Oropharynx is clear and moist  No oropharyngeal exudate  Eyes: Conjunctivae are normal  No scleral icterus  Neck: Neck supple  No JVD present  Cardiovascular: Normal rate, regular rhythm and normal heart sounds  Pulmonary/Chest: Effort normal    Lung sounds are clear to auscultation  No wheezes, crackles or rhonchi   Abdominal: She exhibits no distension  There is no tenderness  Musculoskeletal: She exhibits no edema  Lymphadenopathy:     She has no cervical adenopathy  Neurological: She is alert and oriented to person, place, and time  Skin: Skin is warm and dry  She is not diaphoretic  No erythema  Psychiatric: She has a normal mood and affect   Her behavior is normal  Thought content normal        Lab Review:   Diagnostics:  I have personally reviewed pertinent films in PACS  Chest x-ray: from 4/16 was reviewed with patient  No infiltrates or pleural effusions      :    Xr Chest Pa & Lateral    Result Date: 4/16/2018  Narrative: CHEST INDICATION:   R91 8: Other nonspecific abnormal finding of lung field  Follow-up pneumonia COMPARISON:  02/05/2018 EXAM PERFORMED/VIEWS:  XR CHEST PA & LATERAL FINDINGS: Cardiomediastinal silhouette appears unremarkable  No evidence of heart failure  The lungs are clear  No pneumothorax or pleural effusion  Osseous structures appear within normal limits for patient age  Impression: No acute cardiopulmonary disease   Workstation performed: [id number]

## 2018-04-23 NOTE — PROGRESS NOTES
Hope Bah is a 69 y o  female who presents for evaluation of symptoms of a URI  Symptoms include non productive cough and general fatigue  Onset of symptoms was 3 days ago and has been unchanged since that time  Treatment to date: none  The patient also complains of some pleuritic chest pain with cough and chest congestion, but denies any fevers, chills, rhinorrhea, sinus congestion, sore throat, post-nasal drip, ear pain/fullness, eye discharge or irritation, headache, rash, or /GI symptoms  She states that she never has fevers and her highest ever temperature was 99F  She describes her cough as harsh and dry  She was treated for a similar condition in February which resolved with antibiotics  The patient follows Dr Amparo Alas for her GEORGINA  At one point she had  Chest xray done which showed some possible left lower lobe infiltrate, and this past Monday shortly before onset of her symptoms she had a follow up xray done which was completely WNL  The patient does admit to a history of seasonal allergies  She uses a corticosteroid nasal spray but no antihistamines  She usually experiences worsening of symptoms when the seasons change  Of note, the patient is very talkative and recounted a detailed account of her cataract surgeries to my medical student which lasted at least 10 minutes  During this time, it was noted that the patient experienced no episodes of coughing, conversational dyspnea, or apparent distress  The following portions of the patient's history were reviewed and updated as appropriate: allergies, current medications, past family history, past medical history, past social history, past surgical history and problem list     Review of Systems   Constitutional: Positive for fatigue  Negative for chills, diaphoresis and fever     HENT: Negative for congestion, drooling, ear discharge, ear pain, postnasal drip, rhinorrhea, sinus pain, sinus pressure, sneezing, sore throat and trouble swallowing  Eyes: Negative for discharge, redness and itching  Respiratory: Positive for cough and chest tightness  Negative for choking, shortness of breath and wheezing  Cardiovascular: Negative for chest pain, palpitations and leg swelling  Gastrointestinal: Negative for abdominal distention, abdominal pain, constipation, diarrhea, nausea and vomiting  Genitourinary: Negative  Musculoskeletal: Negative  Neurological: Negative for dizziness, tremors, weakness, light-headedness and headaches  Psychiatric/Behavioral: Negative  Objective     Physical Exam   Constitutional: She appears well-developed and well-nourished  No distress  HENT:   Head: Normocephalic and atraumatic  Nose: Nose normal    Mouth/Throat: Oropharynx is clear and moist  No oropharyngeal exudate  Eyes: Conjunctivae are normal  Pupils are equal, round, and reactive to light  Right eye exhibits no discharge  Left eye exhibits no discharge  No scleral icterus  Neck: Neck supple  No JVD present  Pulmonary/Chest: Effort normal  No accessory muscle usage or stridor  No apnea  No respiratory distress  Breath sounds slightly harsh bilaterally with no definite wheezing, rales or rhonchi  Lymphadenopathy:     She has no cervical adenopathy  Skin: She is not diaphoretic  Assessment/Plan     Viral URI vs Seasonal Allergies    · Discussed diagnosis and treatment of URI  · Discussed the importance of avoiding unnecessary antibiotic therapy  · Suggested symptomatic OTC remedies  · Nasal saline spray for congestion  · Advised starting OTC antihistamine therapy to rule out seasonal allergies  · Advised that as the patient has only been experiencing symptoms for a few days and she is afebrile, she is not a candidate for antibiotics   However, if symptoms do not resolve or at least begin to improve by  Monday she may call the office and we will discuss further treatment options such as antibiotics  All patient questions & concerns were addressed  Patient agrees with her treatment plan  RTO as above       Lg Tomlinson DO  04/23/18  11:48 AM

## 2018-04-23 NOTE — ASSESSMENT & PLAN NOTE
Moderate to severe GEORGINA with overall AHI of 29 3 and oxygen karen 63% on diagnostic study done October 2017  She has not been AV uses CPAP the last few days because of her cough  When she gets over this acute bronchitis she will resume her CPAP therapy and try to lose weight  She is on a CPAP pressure of 7 cm H2O

## 2018-04-23 NOTE — ASSESSMENT & PLAN NOTE
I prescribed Tussionex she can take 5 mL at bedtime for her cough and during the day she can use benzonatate 100 mg 3 times a day as needed

## 2018-05-07 ENCOUNTER — OFFICE VISIT (OUTPATIENT)
Dept: FAMILY MEDICINE CLINIC | Facility: CLINIC | Age: 78
End: 2018-05-07
Payer: MEDICARE

## 2018-05-07 VITALS
SYSTOLIC BLOOD PRESSURE: 140 MMHG | BODY MASS INDEX: 35.44 KG/M2 | DIASTOLIC BLOOD PRESSURE: 82 MMHG | RESPIRATION RATE: 18 BRPM | WEIGHT: 200 LBS | HEIGHT: 63 IN | HEART RATE: 76 BPM | OXYGEN SATURATION: 94 %

## 2018-05-07 DIAGNOSIS — J20.9 ACUTE BRONCHITIS, UNSPECIFIED ORGANISM: Primary | ICD-10-CM

## 2018-05-07 DIAGNOSIS — I10 BENIGN ESSENTIAL HYPERTENSION: ICD-10-CM

## 2018-05-07 DIAGNOSIS — H53.9 VISUAL DISTURBANCE: ICD-10-CM

## 2018-05-07 PROCEDURE — 99214 OFFICE O/P EST MOD 30 MIN: CPT | Performed by: FAMILY MEDICINE

## 2018-05-07 NOTE — PROGRESS NOTES
Assessment/Plan:    Will finish present therapy and monitor resp sx if no further improvement will f/u with pulm  f/u with ophthal  I rec she speak to  re any dissatisfaction with prior care I did reiterate that she cn always leave me a message even if I m not the provider seeing her  Cont med for htn     There are no diagnoses linked to this encounter  Subjective:      Patient ID: Riki Talley is a 69 y o  female  Patient seen in Jan  For resp problem felt it was not handled  Efficiently  Finally felt better but not completely clear   Started with sx again was seen here but felt she was not getting treated properly so went to pulmonary  Received cough med and ab   Now 70 % better  But still hoarse   Had cxr  Week before  No infiltrate   Was told by pulm  she had pneum in jan No hx of smoking but 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 had sec opinion about cataract surg  Is still considering additional surgery        The following portions of the patient's history were reviewed and updated as appropriate: past family history, past medical history, past social history and past surgical history  Review of Systems   Constitutional: Negative  HENT: Negative  Eyes: Positive for visual disturbance  Respiratory: Positive for cough  Cardiovascular: Negative  Gastrointestinal: Negative  Musculoskeletal: Negative  Skin: Negative  Neurological: Negative  Psychiatric/Behavioral: Negative  Objective:      /82   Pulse 76   Resp 18   Ht 5' 3" (1 6 m)   Wt 90 7 kg (200 lb)   SpO2 94%   BMI 35 43 kg/m²          Physical Exam   Constitutional: She is oriented to person, place, and time  She appears well-developed and well-nourished  HENT:   Head: Normocephalic and atraumatic  Right Ear: External ear normal    Left Ear: External ear normal    Eyes: Conjunctivae and EOM are normal  Pupils are equal, round, and reactive to light     Neck: Normal range of motion  Neck supple  Cardiovascular: Normal rate, regular rhythm and normal heart sounds  Pulmonary/Chest: Effort normal and breath sounds normal    Abdominal: Soft  Bowel sounds are normal    Musculoskeletal: Normal range of motion  Neurological: She is alert and oriented to person, place, and time  Skin: Skin is warm  Psychiatric: She has a normal mood and affect   Her behavior is normal

## 2018-06-06 ENCOUNTER — OFFICE VISIT (OUTPATIENT)
Dept: PULMONOLOGY | Facility: MEDICAL CENTER | Age: 78
End: 2018-06-06
Payer: MEDICARE

## 2018-06-06 VITALS
HEIGHT: 63 IN | DIASTOLIC BLOOD PRESSURE: 74 MMHG | TEMPERATURE: 98.4 F | BODY MASS INDEX: 35.97 KG/M2 | SYSTOLIC BLOOD PRESSURE: 124 MMHG | OXYGEN SATURATION: 97 % | WEIGHT: 203 LBS | RESPIRATION RATE: 12 BRPM | HEART RATE: 69 BPM

## 2018-06-06 DIAGNOSIS — E66.01 CLASS 2 SEVERE OBESITY DUE TO EXCESS CALORIES WITH SERIOUS COMORBIDITY AND BODY MASS INDEX (BMI) OF 35.0 TO 35.9 IN ADULT (HCC): ICD-10-CM

## 2018-06-06 DIAGNOSIS — G47.33 OBSTRUCTIVE SLEEP APNEA: ICD-10-CM

## 2018-06-06 DIAGNOSIS — R06.00 DYSPNEA ON EXERTION: Primary | ICD-10-CM

## 2018-06-06 DIAGNOSIS — R05.9 COUGH: ICD-10-CM

## 2018-06-06 PROBLEM — R06.09 DYSPNEA ON EXERTION: Status: ACTIVE | Noted: 2018-06-06

## 2018-06-06 PROCEDURE — 99214 OFFICE O/P EST MOD 30 MIN: CPT | Performed by: INTERNAL MEDICINE

## 2018-06-06 PROCEDURE — 94010 BREATHING CAPACITY TEST: CPT | Performed by: INTERNAL MEDICINE

## 2018-06-06 RX ORDER — FLUTICASONE FUROATE AND VILANTEROL 100; 25 UG/1; UG/1
1 POWDER RESPIRATORY (INHALATION) DAILY
Qty: 1 INHALER | Refills: 0 | Status: SHIPPED | COMMUNITY
Start: 2018-06-06 | End: 2018-09-21 | Stop reason: SDUPTHER

## 2018-06-06 RX ORDER — HYDROCODONE POLISTIREX AND CHLORPHENIRAMINE POLISTIREX 10; 8 MG/5ML; MG/5ML
5 SUSPENSION, EXTENDED RELEASE ORAL
Qty: 120 ML | Refills: 0 | Status: SHIPPED | OUTPATIENT
Start: 2018-06-06 | End: 2018-07-09

## 2018-06-06 NOTE — ASSESSMENT & PLAN NOTE
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

## 2018-06-06 NOTE — PROGRESS NOTES
Assessment/Plan:     Problem List Items Addressed This Visit        Respiratory    Obstructive sleep apnea     Moderate obstructive sleep apnea with good response and use of CPAP at 7 cm water  NG was diagnosed via study October 2017 which showed overall AHI of 29 3 and oxygen karen of 63%  This was consistent moderate to severe obstructive sleep apnea  She has been compliant with using her CPAP and feels better rested in the morning and is not having the daytime fatigue she used to have  She will continue using the CPAP and I encouraged her to lose weight            Other    Overweight     I did encourage Ravindra Quevedo to lose weight  Cough     Ravindra Quevedo has some persistent nonproductive cough since her acute bronchitis in April of this year  Chest x-ray shows no abnormalities  She never smoked  This cough may just be post infectious cough from the bronchitis  Other possibility is that she could have some mild reactive airways disease related to this recent infection or could be related to use of chemical products 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  I did give her a trial of Breo 100 mcg 1 puff daily for 2 weeks  Also I did give her prescription for Tussionex 5 mL to use at bedtime to help her cough that is disturbing her sleep  She is not having any significant postnasal drainage and does use saline nasal spray and over-the-counter antihistamine  I suggested she may want to try Allegra         Relevant Medications    hydrocodone-chlorpheniramine polistirex (TUSSIONEX) 10-8 mg/5 mL ER suspension    fluticasone-vilanterol (BREO ELLIPTA) 100-25 mcg/inh inhaler    Dyspnea on exertion - Primary     Ravindra Quevedo is complaining of shortness of breath with activity such as walking fast or going up a flight of stairs  She does not have any associated chest pain  She is overweight and this may be part of the problem    She also does have some persistent cough and could have an element of some hyper reactive airways disease related to acute bronchitis in April  I will give her a trial of Breo 100 mcg 1 puff daily for 2 weeks  A sample was given  Spirometry today showed normal lung volumes without any airflow obstruction  She will call us in the next week or so to let us know if this is helping her breathing  Also she has not had blood work since June 2016  At that time her hemoglobin was normal at 12 9  I will order a CBC, CMP and D-dimer test   Also will order thyroid panel she still has some mild fatigue despite use of the CPAP  We did make a follow-up appointment for her to see her cardiologist Dr Matt Wren  Her appointment is for July 9th at 8:20 a m  in the morning         Relevant Medications    fluticasone-vilanterol (BREO ELLIPTA) 100-25 mcg/inh inhaler    Other Relevant Orders    POCT spirometry (Completed)    Comprehensive metabolic panel    CBC and differential    D-dimer, quantitative    Thyroid Panel With TSH      Other Visit Diagnoses     Class 2 severe obesity due to excess calories with serious comorbidity and body mass index (BMI) of 35 0 to 35 9 in [facility])        Relevant Orders    Lipid panel            No Follow-up on file  All questions are answered to the patient's satisfaction and understanding  She verbalizes understanding  She is encouraged to call with any further questions or concerns  Portions of the record may have been created with voice recognition software  Occasional wrong word or "sound a like" substitutions may have occurred due to the inherent limitations of voice recognition software  Read the chart carefully and recognize, using context, where substitutions have occurred  ______________________________________________________________________    Chief Complaint:   Chief Complaint   Patient presents with    Follow-up     2m    Sleep Apnea     no issues with machine   only can sleep for 3hrs and wakes up and feels like she cant breath    Shortness of Breath     not the same     Cough      maybe from hair productis that she uses  Gely Saavedra     has not change since being sick       Patient ID: Denis Conner is a 69 y o  y o  female has a past medical history of Anxiety; Bleeding from varicose vein; Environmental allergies; Fracture of radius; Obesity; Scoliosis; Vaginitis; and Vulvovaginitis  6/6/2018  HPI     Denis Conner presents for a follow-up visit today  She still having some residual nonproductive cough since having acute bronchitis in April of this year  At that time showed cough productive for yellow to green mucus and I prescribed Ceftin for 7 day course  She no longer has any mucus production but still has some dry intermittent cough  She has been having shortness of breath for over a year  This is mostly with exertion and may be slightly more prominent since having this recent bronchitis  Chest x-ray that was done April 16 showed no infiltrates or pleural effusion  Heart size was normal  She is not having any wheezing  No chest pain with activity  She primary gets short of breath when she walks longer distances or goes up a flight of stairs  No nocturnal dyspnea or chest pain with exertion  Denis Conner does work as a  and has been doing this for 40 years  She works 3 days a week now  She is worried that possibly a new hair product that she has used for the past year that she sprinkles into people's her to make it appear luis could be causing her cough  She states this is a keratin type of product  She has used this for over 1 year  Denis Conner does have moderate to severe obstructive sleep apnea  Her overall AHI was 29 3 with oxygen karen 63%  She had a diagnostic study done October 2017  She is be using her CPAP at night on a regular basis and her present setting is 7 cm of water  She has tolerated CPAP well and feels belt better rested in the morning now and her daytime fatigue is significantly improved    She does see a cardiologist Dr Mirlande Monteiro  She did have an echocardiogram done in October 2017 and showed normal left ventricular systolic function with ejection fraction of 55%  Estimated pulmonary artery pressure was 35 mm Hg and there is no significant valvular heart disease  She had a Lexiscan stress test done December 2017 which was an equivocal study  She did have a hypertensive blood pressure response to the exercise  EKG portion did not show any evidence of ischemia  Vermarlyn Howell does get some mild postnasal drainage and nasal congestion does use saline nasal spray for this  Sometimes she will use Zyrtec over-the-counter  She does have some mild hoarseness of times ever since she had her bronchitis April  She only gets occasional GERD  Review of Systems   Constitutional: Negative for chills, fever and unexpected weight change  Her fatigue has improved significantly since using CPAP therapy  Has exertional dyspnea   HENT: Negative for congestion, rhinorrhea and sore throat  Eyes: Negative for discharge and redness  Respiratory: Positive for cough and shortness of breath  Negative for wheezing  Cardiovascular: Negative for chest pain, palpitations and leg swelling  Gastrointestinal: Negative for abdominal distention, abdominal pain and nausea  Endocrine: Negative for polydipsia and polyphagia  Genitourinary: Negative for dysuria  Musculoskeletal: Negative for joint swelling and myalgias  Skin: Negative for rash  Neurological: Negative for light-headedness  Smoking history: She reports that she has never smoked   She has never used smokeless tobacco     The following portions of the patient's history were reviewed and updated as appropriate: allergies, current medications, past family history, past medical history, past social history, past surgical history and problem list     Immunization History   Administered Date(s) Administered    Pneumococcal Polysaccharide PPV23 10/01/2006     Current Outpatient Prescriptions   Medication Sig Dispense Refill    albuterol (2 5 mg/3 mL) 0 083 % nebulizer solution Take 3 mL (2 5 mg total) by nebulization every 6 (six) hours as needed for wheezing 75 mL 0    aspirin 81 mg chewable tablet Chew 1 tablet daily      b complex vitamins tablet Take 1 tablet by mouth daily      fluticasone (FLONASE) 50 mcg/act nasal spray 2 sprays into each nostril daily 16 g 0    Naproxen Sodium (ALEVE) 220 MG CAPS Take by mouth      nystatin-triamcinolone (MYCOLOG-II) cream Apply topically      sodium chloride (AYR SALINE NASAL) nasal gel 1 application into each nostril every hour as needed      benzonatate (TESSALON PERLES) 100 mg capsule Take 1 capsule (100 mg total) by mouth 3 (three) times a day as needed for cough 30 capsule 0    fluticasone-vilanterol (BREO ELLIPTA) 100-25 mcg/inh inhaler Inhale 1 puff daily Rinse mouth after use  1 Inhaler 0    hydrochlorothiazide (HYDRODIURIL) 12 5 mg tablet Take 1 tablet by mouth Daily      hydrocodone-chlorpheniramine polistirex (TUSSIONEX) 10-8 mg/5 mL ER suspension Take 5 mL by mouth daily at bedtime as needed for cough Max Daily Amount: 5 mL 120 mL 0    potassium chloride (MICRO-K) 10 MEQ CR capsule Take by mouth       No current facility-administered medications for this visit  Allergies: Penicillins and Sulfa antibiotics    Objective:  Vitals:    06/06/18 0808   BP: 124/74   BP Location: Left arm   Patient Position: Sitting   Cuff Size: Adult   Pulse: 69   Resp: 12   Temp: 98 4 °F (36 9 °C)   TempSrc: Oral   SpO2: 97%   Weight: 92 1 kg (203 lb)   Height: 5' 3" (1 6 m)   Oxygen Therapy  SpO2: 97 %    Wt Readings from Last 3 Encounters:   06/06/18 92 1 kg (203 lb)   05/07/18 90 7 kg (200 lb)   04/20/18 92 1 kg (203 lb)     Body mass index is 35 96 kg/m²  Physical Exam   Constitutional: She is oriented to person, place, and time  She appears well-developed and well-nourished  No distress     Patient is overweight  No distress  Does have intermittent nonproductive cough  HENT:   Head: Normocephalic  Nose: Nose normal    Mouth/Throat: Oropharynx is clear and moist  No oropharyngeal exudate  Eyes: Conjunctivae are normal  Pupils are equal, round, and reactive to light  Neck: Neck supple  No JVD present  No tracheal deviation present  Cardiovascular: Normal rate, regular rhythm and normal heart sounds  Pulmonary/Chest: Effort normal    Lung sounds are clear to auscultation  No wheezes, crackles or rhonchi   Abdominal: Soft  She exhibits no distension  There is no tenderness  There is no guarding  Musculoskeletal: She exhibits no edema  Lymphadenopathy:     She has no cervical adenopathy  Neurological: She is alert and oriented to person, place, and time  Skin: Skin is warm and dry  No rash noted  Psychiatric: She has a normal mood and affect  Her behavior is normal  Thought content normal        Diagnostics:  I have personally reviewed pertinent films in PACS  Chest x-ray:  Done April 16, 2018  Heart size is normal  Lung fields are clear  No pleural effusions  Office Spirometry Results:  Done today 6/6/18    FVC - 2 29 L    87%  FEV1 - 1 75 L   90%  FEV1/FVC% - 77%    Lung volumes are normal         ESS:    No results found

## 2018-06-06 NOTE — ASSESSMENT & PLAN NOTE
Era Romeo is complaining of shortness of breath with activity such as walking fast or going up a flight of stairs  She does not have any associated chest pain  She is overweight and this may be part of the problem  She also does have some persistent cough and could have an element of some hyper reactive airways disease related to acute bronchitis in April  I will give her a trial of Breo 100 mcg 1 puff daily for 2 weeks  A sample was given  Spirometry today showed normal lung volumes without any airflow obstruction  She will call us in the next week or so to let us know if this is helping her breathing  Also she has not had blood work since June 2016  At that time her hemoglobin was normal at 12 9  I will order a CBC, CMP and D-dimer test   Also will order thyroid panel she still has some mild fatigue despite use of the CPAP  We did make a follow-up appointment for her to see her cardiologist Dr David Mensah    Her appointment is for July 9th at 8:20 a m  in the morning

## 2018-06-06 NOTE — PATIENT INSTRUCTIONS
Use Breo 100 mcg 1 puff daily in morning for 2 weeks    Call us next week to let us know if it helps her cough and shortness of Breath    Take Tussionex 5 mL at bedtime as needed for cough    I go for blood work and fast for about 10 hr   Can have water

## 2018-06-06 NOTE — ASSESSMENT & PLAN NOTE
Moderate obstructive sleep apnea with good response and use of CPAP at 7 cm water  NG was diagnosed via study October 2017 which showed overall AHI of 29 3 and oxygen karen of 63%  This was consistent moderate to severe obstructive sleep apnea  She has been compliant with using her CPAP and feels better rested in the morning and is not having the daytime fatigue she used to have    She will continue using the CPAP and I encouraged her to lose weight

## 2018-06-08 ENCOUNTER — APPOINTMENT (OUTPATIENT)
Dept: LAB | Facility: HOSPITAL | Age: 78
End: 2018-06-08
Attending: INTERNAL MEDICINE
Payer: MEDICARE

## 2018-06-08 ENCOUNTER — TRANSCRIBE ORDERS (OUTPATIENT)
Dept: ADMINISTRATIVE | Facility: HOSPITAL | Age: 78
End: 2018-06-08

## 2018-06-08 DIAGNOSIS — R06.00 DYSPNEA ON EXERTION: ICD-10-CM

## 2018-06-08 DIAGNOSIS — I10 ESSENTIAL (PRIMARY) HYPERTENSION: ICD-10-CM

## 2018-06-08 DIAGNOSIS — E66.01 CLASS 2 SEVERE OBESITY DUE TO EXCESS CALORIES WITH SERIOUS COMORBIDITY AND BODY MASS INDEX (BMI) OF 35.0 TO 35.9 IN ADULT (HCC): ICD-10-CM

## 2018-06-08 LAB
ALBUMIN SERPL BCP-MCNC: 3.4 G/DL (ref 3.5–5)
ALP SERPL-CCNC: 56 U/L (ref 46–116)
ALT SERPL W P-5'-P-CCNC: 27 U/L (ref 12–78)
ANION GAP SERPL CALCULATED.3IONS-SCNC: 7 MMOL/L (ref 4–13)
AST SERPL W P-5'-P-CCNC: 24 U/L (ref 5–45)
BASOPHILS # BLD AUTO: 0.07 THOUSANDS/ΜL (ref 0–0.1)
BASOPHILS NFR BLD AUTO: 1 % (ref 0–1)
BILIRUB SERPL-MCNC: 0.6 MG/DL (ref 0.2–1)
BUN SERPL-MCNC: 18 MG/DL (ref 5–25)
CALCIUM SERPL-MCNC: 8.5 MG/DL (ref 8.3–10.1)
CHLORIDE SERPL-SCNC: 107 MMOL/L (ref 100–108)
CHOLEST SERPL-MCNC: 178 MG/DL (ref 50–200)
CO2 SERPL-SCNC: 27 MMOL/L (ref 21–32)
CREAT SERPL-MCNC: 0.72 MG/DL (ref 0.6–1.3)
DEPRECATED D DIMER PPP: 440 NG/ML (FEU) (ref 190–520)
EOSINOPHIL # BLD AUTO: 0.27 THOUSAND/ΜL (ref 0–0.61)
EOSINOPHIL NFR BLD AUTO: 5 % (ref 0–6)
ERYTHROCYTE [DISTWIDTH] IN BLOOD BY AUTOMATED COUNT: 15.6 % (ref 11.6–15.1)
GFR SERPL CREATININE-BSD FRML MDRD: 81 ML/MIN/1.73SQ M
GLUCOSE P FAST SERPL-MCNC: 97 MG/DL (ref 65–99)
HCT VFR BLD AUTO: 41.1 % (ref 34.8–46.1)
HDLC SERPL-MCNC: 61 MG/DL (ref 40–60)
HGB BLD-MCNC: 12.5 G/DL (ref 11.5–15.4)
IMM GRANULOCYTES # BLD AUTO: 0.01 THOUSAND/UL (ref 0–0.2)
IMM GRANULOCYTES NFR BLD AUTO: 0 % (ref 0–2)
LDLC SERPL CALC-MCNC: 104 MG/DL (ref 0–100)
LYMPHOCYTES # BLD AUTO: 2.26 THOUSANDS/ΜL (ref 0.6–4.47)
LYMPHOCYTES NFR BLD AUTO: 38 % (ref 14–44)
MCH RBC QN AUTO: 20.7 PG (ref 26.8–34.3)
MCHC RBC AUTO-ENTMCNC: 30.4 G/DL (ref 31.4–37.4)
MCV RBC AUTO: 68 FL (ref 82–98)
MONOCYTES # BLD AUTO: 0.41 THOUSAND/ΜL (ref 0.17–1.22)
MONOCYTES NFR BLD AUTO: 7 % (ref 4–12)
NEUTROPHILS # BLD AUTO: 2.87 THOUSANDS/ΜL (ref 1.85–7.62)
NEUTS SEG NFR BLD AUTO: 49 % (ref 43–75)
NONHDLC SERPL-MCNC: 117 MG/DL
NRBC BLD AUTO-RTO: 0 /100 WBCS
PLATELET # BLD AUTO: 248 THOUSANDS/UL (ref 149–390)
PMV BLD AUTO: 9.7 FL (ref 8.9–12.7)
POTASSIUM SERPL-SCNC: 4 MMOL/L (ref 3.5–5.3)
PROT SERPL-MCNC: 7.1 G/DL (ref 6.4–8.2)
RBC # BLD AUTO: 6.05 MILLION/UL (ref 3.81–5.12)
SODIUM SERPL-SCNC: 141 MMOL/L (ref 136–145)
T4 FREE SERPL-MCNC: 0.89 NG/DL (ref 0.76–1.46)
T4 FREE SERPL-MCNC: 0.91 NG/DL (ref 0.76–1.46)
TRIGL SERPL-MCNC: 67 MG/DL
TSH SERPL DL<=0.05 MIU/L-ACNC: 3.93 UIU/ML (ref 0.36–3.74)
WBC # BLD AUTO: 5.89 THOUSAND/UL (ref 4.31–10.16)

## 2018-06-08 PROCEDURE — 84439 ASSAY OF FREE THYROXINE: CPT

## 2018-06-08 PROCEDURE — 85025 COMPLETE CBC W/AUTO DIFF WBC: CPT

## 2018-06-08 PROCEDURE — 80053 COMPREHEN METABOLIC PANEL: CPT

## 2018-06-08 PROCEDURE — 85379 FIBRIN DEGRADATION QUANT: CPT

## 2018-06-08 PROCEDURE — 80061 LIPID PANEL: CPT

## 2018-06-08 PROCEDURE — 36415 COLL VENOUS BLD VENIPUNCTURE: CPT

## 2018-06-08 PROCEDURE — 84436 ASSAY OF TOTAL THYROXINE: CPT

## 2018-06-08 PROCEDURE — 84443 ASSAY THYROID STIM HORMONE: CPT

## 2018-06-08 PROCEDURE — 84479 ASSAY OF THYROID (T3 OR T4): CPT

## 2018-06-09 LAB
DEPRECATED FTI SERPL-MCNC: 1.5 UG/DL (ref 1.2–4.9)
T3RU NFR SERPL: 21 % (ref 24–39)
T4 SERPL-MCNC: 7 UG/DL (ref 4.5–12)

## 2018-07-09 ENCOUNTER — OFFICE VISIT (OUTPATIENT)
Dept: CARDIOLOGY CLINIC | Facility: CLINIC | Age: 78
End: 2018-07-09
Payer: MEDICARE

## 2018-07-09 VITALS
HEIGHT: 63 IN | BODY MASS INDEX: 35.3 KG/M2 | HEART RATE: 71 BPM | OXYGEN SATURATION: 99 % | DIASTOLIC BLOOD PRESSURE: 70 MMHG | WEIGHT: 199.2 LBS | SYSTOLIC BLOOD PRESSURE: 118 MMHG

## 2018-07-09 DIAGNOSIS — I49.1 PREMATURE ATRIAL CONTRACTIONS: ICD-10-CM

## 2018-07-09 DIAGNOSIS — R60.0 BILATERAL LEG EDEMA: ICD-10-CM

## 2018-07-09 DIAGNOSIS — E66.9 CLASS 2 OBESITY: ICD-10-CM

## 2018-07-09 DIAGNOSIS — R00.2 INTERMITTENT PALPITATIONS: ICD-10-CM

## 2018-07-09 DIAGNOSIS — R06.00 DYSPNEA ON EXERTION: Primary | ICD-10-CM

## 2018-07-09 DIAGNOSIS — G47.33 OBSTRUCTIVE SLEEP APNEA: ICD-10-CM

## 2018-07-09 DIAGNOSIS — I10 ESSENTIAL HYPERTENSION: ICD-10-CM

## 2018-07-09 DIAGNOSIS — R05.3 COUGH, PERSISTENT: ICD-10-CM

## 2018-07-09 PROCEDURE — 99214 OFFICE O/P EST MOD 30 MIN: CPT | Performed by: INTERNAL MEDICINE

## 2018-07-09 PROCEDURE — 93000 ELECTROCARDIOGRAM COMPLETE: CPT | Performed by: INTERNAL MEDICINE

## 2018-07-09 NOTE — PROGRESS NOTES
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, with some family history of thalassemia  9   History of mildly abnormal nuclear stress test with likely artifactual apical perfusion defect on 12/27/2017   10   History of infrequent benign premature atrial contractions and rare asymptomatic PVCs on Holter monitor 11/06/2017  11  Benign echocardiogram consistent with hypertensive heart disease of a mild degree on 10/24/2017      Plan       Patient Instructions     1  Continue present medications and management  2   Review mildly elevated TSH level with primary care physician as well as low RBC indices, which may be reflection of thalassemia minor  3   Cardiology follow-up in approximately 6 months with Dr Celso Oneal with EKG at that time  HPI    This 69 y o  female  denies new cardiopulmonary and medical symptoms  At the end of January, she developed a cough and was eventually diagnosis having left basal pneumonia followed by tracheobronchitis, with her cough currently suppressed after treatment by Dr Kalani Alvarez with Divya Drain and Tussionex  She has residual mild exertional dyspnea but denies any other cardiopulmonary symptoms  The patient discontinued taking potassium chloride and started instead on yogurt and V8 juice shortly after being prescribed potassium supplementation 6 months ago  Her latest potassium was 4 0 on 06/08/2018  She has been faithfully using her CPAP treatment      The patient recently started herself on active patch with NXGEN, which is a natural topical patch reportedly for weight loss, with patient beginning this on 07/04/2018  Review of Systems    All other systems negative, except as noted in history of present illness    Historical Information   Past Medical History:   Diagnosis Date    Anxiety     Bleeding from varicose vein     Environmental allergies     Fracture of radius     Obesity     Scoliosis     mild     Vaginitis     Vulvovaginitis      Past Surgical History:   Procedure Laterality Date    EYE SURGERY      cataract     History   Alcohol Use    Yes     Comment: social      History   Drug Use No     History   Smoking Status    Never Smoker   Smokeless Tobacco    Never Used       Family History:  History reviewed  No pertinent family history  Meds/Allergies     Prior to Admission medications    Medication Sig Start Date End Date Taking? Authorizing Provider   aspirin 81 mg chewable tablet Chew 1 tablet daily 1/15/18  Yes Historical Provider, MD   b complex vitamins tablet Take 1 tablet by mouth daily   Yes Historical Provider, MD   fluticasone (FLONASE) 50 mcg/act nasal spray 2 sprays into each nostril daily 3/20/18  Yes Leela Patrick MD   fluticasone-vilanterol (BREO ELLIPTA) 100-25 mcg/inh inhaler Inhale 1 puff daily Rinse mouth after use   6/6/18  Yes Frances Cons, DO   hydrochlorothiazide (HYDRODIURIL) 12 5 mg tablet Take 1 tablet by mouth Daily 1/15/18  Yes Historical Provider, MD   Naproxen Sodium (ALEVE) 220 MG CAPS Take by mouth   Yes Historical Provider, MD   nystatin-triamcinolone (MYCOLOG-II) cream Apply topically   Yes Historical Provider, MD   sodium chloride (AYR SALINE NASAL) nasal gel 1 application into each nostril every hour as needed   Yes Historical Provider, MD   albuterol (2 5 mg/3 mL) 0 083 % nebulizer solution Take 3 mL (2 5 mg total) by nebulization every 6 (six) hours as needed for wheezing 2/1/18 7/9/18  Erlinda Clements NP   benzonatate (TESSALON PERLES) 100 mg capsule Take 1 capsule (100 mg total) by mouth 3 (three) times a day as needed for cough 4/23/18 7/9/18  Cara Vigil, DO   hydrocodone-chlorpheniramine polistirex (TUSSIONEX) 10-8 mg/5 mL ER suspension Take 5 mL by mouth daily at bedtime as needed for cough Max Daily Amount: 5 mL 6/6/18 7/9/18  Cara Vigil DO   potassium chloride (MICRO-K) 10 MEQ CR capsule Take by mouth 1/15/18 7/9/18  Historical Provider, MD       Allergies   Allergen Reactions    Penicillins     Sulfa Antibiotics          Vitals:    07/09/18 0826   BP: 118/70   BP Location: Left arm   Patient Position: Sitting   Cuff Size: Standard   Pulse: 71   SpO2: 99%   Weight: 90 4 kg (199 lb 3 2 oz)   Height: 5' 3" (1 6 m)       Body mass index is 35 29 kg/m²      Physical Exam:    General Appearance:  Alert, cooperative, no distress, appears stated age and is moderately obese   Head:  Normocephalic, without obvious abnormality, atraumatic   Eyes:  PERRL, conjunctiva/corneas clear, EOM's intact,   both eyes   Ears:  Normal TM's and external ear canals, both ears   Nose: Nares normal, septum midline, mucosa normal, no drainage or sinus tenderness   Throat: Lips, mucosa, and tongue normal; teeth and gums normal   Neck: Supple, symmetrical, trachea midline, no adenopathy, thyroid: not enlarged, symmetric, no tenderness/mass/nodules, no carotid bruit or JVD   Back:   Symmetric, no curvature, ROM normal, no CVA tenderness   Lungs:   Clear to auscultation bilaterally, respirations unlabored   Chest Wall:  No tenderness or deformity   Heart:  Regular rate and rhythm, S1, S2 normal, no murmur, rub or gallop   Abdomen:   Soft, non-tender, bowel sounds active all four quadrants,  no masses, no organomegaly with moderate obesity noted   Extremities: Extremities normal, atraumatic, no cyanosis or edema   Pulses: 2+ on right with diminished pedal pulses on left, though both feet are equally warm   Skin: Skin showed normal color, texture, turgor and no rashes or lesions   Lymph nodes: Cervical, supraclavicular, and axillary nodes normal   Neurologic: Normal         Cardiographics    ECG 07/09/2018:    Normal ECG, unchanged from 01/30/2018    Imaging    Chest X-Ray :  Xr Chest Pa & Lateral    Result Date: 4/16/2018  Impression No acute cardiopulmonary disease  Workstation performed: [id number]     Xr Chest Pa & Lateral    Result Date: 2/6/2018  Impression Vague left basilar infiltrate  The study was marked in [facility] for immediate notification   Workstation performed: [id number]             Lab Review       Lab Results   Component Value Date     06/08/2018    K 4 0 06/08/2018     06/08/2018    CO2 27 06/08/2018    ANIONGAP 7 06/08/2018    BUN 18 06/08/2018    CREATININE 0 72 06/08/2018    GLUCOSE 97 06/10/2016    GLUF 97 06/08/2018    CALCIUM 8 5 06/08/2018    AST 24 06/08/2018    ALT 27 06/08/2018    ALKPHOS 56 06/08/2018    PROT 7 1 06/08/2018    BILITOT 0 60 06/08/2018    EGFR 81 06/08/2018       Lab Results   Component Value Date    CHOL 178 06/08/2018     Lab Results   Component Value Date    HDL 61 (H) 06/08/2018     Lab Results   Component Value Date    LDLCALC 104 (H) 06/08/2018     Lab Results   Component Value Date    TRIG 67 06/08/2018 06/08/2018:  Low MCV, MCH, MCHC with low-normal H/H 12 5/41 1  Slightly elevated TSH of 3 931    Lab Results   Component Value Date    GLUCOSE 97 06/10/2016    CALCIUM 8 5 06/08/2018     06/08/2018    K 4 0 06/08/2018    CO2 27 06/08/2018     06/08/2018    BUN 18 06/08/2018    CREATININE 0 72 06/08/2018           Timur Hansen MD

## 2018-07-09 NOTE — PATIENT INSTRUCTIONS
1   Continue present medications and management  2   Review mildly elevated TSH level with primary care physician as well as low RBC indices, which may be reflection of thalassemia minor  3   Cardiology follow-up in approximately 6 months with Dr Wallace Patterson with EKG at that time

## 2018-07-20 ENCOUNTER — OFFICE VISIT (OUTPATIENT)
Dept: PULMONOLOGY | Facility: MEDICAL CENTER | Age: 78
End: 2018-07-20
Payer: MEDICARE

## 2018-07-20 VITALS
HEART RATE: 78 BPM | SYSTOLIC BLOOD PRESSURE: 142 MMHG | HEIGHT: 63 IN | OXYGEN SATURATION: 97 % | BODY MASS INDEX: 35.44 KG/M2 | RESPIRATION RATE: 12 BRPM | WEIGHT: 200 LBS | TEMPERATURE: 99.5 F | DIASTOLIC BLOOD PRESSURE: 82 MMHG

## 2018-07-20 DIAGNOSIS — R05.9 COUGH: ICD-10-CM

## 2018-07-20 DIAGNOSIS — J20.9 ACUTE BRONCHITIS, UNSPECIFIED ORGANISM: Primary | ICD-10-CM

## 2018-07-20 DIAGNOSIS — R09.82 POST-NASAL DRIP: ICD-10-CM

## 2018-07-20 DIAGNOSIS — G47.33 OBSTRUCTIVE SLEEP APNEA: ICD-10-CM

## 2018-07-20 PROCEDURE — 99213 OFFICE O/P EST LOW 20 MIN: CPT | Performed by: INTERNAL MEDICINE

## 2018-07-20 NOTE — PROGRESS NOTES
Assessment/Plan:     Problem List Items Addressed This Visit        Respiratory    Obstructive sleep apnea     Continued CPAP compliance is encouraged  No changes to pressure has been made at this time  Slightly elevated AHI however daytime symptoms are much improved  RESOLVED: Acute bronchitis - Primary     Resolved  She has stopped the Breo             Other    Post-nasal drip     Stable  Continue nasal saline, flonase as needed         RESOLVED: Cough            F/u 6 months  All questions are answered to the patient's satisfaction and understanding  She verbalizes understanding  She is encouraged to call with any further questions or concerns  Portions of the record may have been created with voice recognition software  Occasional wrong word or "sound a like" substitutions may have occurred due to the inherent limitations of voice recognition software  Read the chart carefully and recognize, using context, where substitutions have occurred  Electronically Signed by Masoud Beltran MD    ______________________________________________________________________    Chief Complaint:   Chief Complaint   Patient presents with    Follow-up     6m    Cough     resolve when taking Breo inhaler       Patient ID: Mili Cervantes is a 69 y o  y o  female has a past medical history of Anxiety; Bleeding from varicose vein; Environmental allergies; Fracture of radius; Obesity; Scoliosis; Vaginitis; and Vulvovaginitis  7/20/2018  Patient presents for follow-up visit  She states that the CPAP is working for her  It helps with her snoring  She was given Breo last time- has not refilled  Cough is disappearing  When she was using it- she was getting hoarse  Nothing like it was  No post nasal drip  She is not using Flonase at this time  She also notes that she has been using this new hair product in her salon that has keratin fibers, silica base   She feels like this may be related to her frequent bronchitis  Review of Systems   Constitutional: Negative for activity change, fatigue and fever  HENT: Negative for postnasal drip  Respiratory: Negative for cough and shortness of breath  Smoking history: She reports that she has never smoked  She has never used smokeless tobacco       Immunization History   Administered Date(s) Administered    Pneumococcal Polysaccharide PPV23 10/01/2006     Current Outpatient Prescriptions   Medication Sig Dispense Refill    aspirin 81 mg chewable tablet Chew 1 tablet daily      b complex vitamins tablet Take 1 tablet by mouth daily      fluticasone (FLONASE) 50 mcg/act nasal spray 2 sprays into each nostril daily 16 g 0    fluticasone-vilanterol (BREO ELLIPTA) 100-25 mcg/inh inhaler Inhale 1 puff daily Rinse mouth after use  1 Inhaler 0    hydrochlorothiazide (HYDRODIURIL) 12 5 mg tablet Take 1 tablet by mouth Daily      Naproxen Sodium (ALEVE) 220 MG CAPS Take by mouth      nystatin-triamcinolone (MYCOLOG-II) cream Apply topically      sodium chloride (AYR SALINE NASAL) nasal gel 1 application into each nostril every hour as needed       No current facility-administered medications for this visit  Allergies: Penicillins and Sulfa antibiotics    Objective:  Vitals:    07/20/18 0958   BP: 142/82   BP Location: Right arm   Patient Position: Sitting   Cuff Size: Adult   Pulse: 78   Resp: 12   Temp: 99 5 °F (37 5 °C)   TempSrc: Oral   SpO2: 97%   Weight: 90 7 kg (200 lb)   Height: 5' 3" (1 6 m)   Oxygen Therapy  SpO2: 97 %    Wt Readings from Last 3 Encounters:   07/20/18 90 7 kg (200 lb)   07/09/18 90 4 kg (199 lb 3 2 oz)   06/06/18 92 1 kg (203 lb)     Body mass index is 35 43 kg/m²  Physical Exam   Constitutional: She is oriented to person, place, and time  She appears well-nourished  HENT:   Head: Atraumatic  Mouth/Throat: No oropharyngeal exudate  Eyes: EOM are normal    Neck: Neck supple     Cardiovascular: Normal rate and regular rhythm  Pulmonary/Chest: Effort normal and breath sounds normal    Abdominal: Soft  Musculoskeletal: Normal range of motion  She exhibits no edema  Neurological: She is alert and oriented to person, place, and time  Skin: Skin is warm and dry  Psychiatric: She has a normal mood and affect  Her behavior is normal    Vitals reviewed

## 2018-07-22 PROBLEM — R05.9 COUGH: Status: RESOLVED | Noted: 2018-04-23 | Resolved: 2018-07-22

## 2018-07-22 PROBLEM — J20.9 ACUTE BRONCHITIS: Status: RESOLVED | Noted: 2018-04-23 | Resolved: 2018-07-22

## 2018-07-22 PROBLEM — R91.8 PULMONARY INFILTRATE IN LEFT LUNG ON CXR: Status: RESOLVED | Noted: 2018-03-21 | Resolved: 2018-07-22

## 2018-07-22 NOTE — ASSESSMENT & PLAN NOTE
Continued CPAP compliance is encouraged  No changes to pressure has been made at this time  Slightly elevated AHI however daytime symptoms are much improved

## 2018-09-21 ENCOUNTER — OFFICE VISIT (OUTPATIENT)
Dept: FAMILY MEDICINE CLINIC | Facility: CLINIC | Age: 78
End: 2018-09-21
Payer: MEDICARE

## 2018-09-21 VITALS
HEART RATE: 66 BPM | WEIGHT: 201.4 LBS | TEMPERATURE: 97.2 F | BODY MASS INDEX: 35.68 KG/M2 | DIASTOLIC BLOOD PRESSURE: 80 MMHG | SYSTOLIC BLOOD PRESSURE: 122 MMHG | OXYGEN SATURATION: 97 %

## 2018-09-21 DIAGNOSIS — I10 BENIGN ESSENTIAL HYPERTENSION: Primary | ICD-10-CM

## 2018-09-21 DIAGNOSIS — R06.00 DYSPNEA ON EXERTION: ICD-10-CM

## 2018-09-21 DIAGNOSIS — G47.33 OBSTRUCTIVE SLEEP APNEA: ICD-10-CM

## 2018-09-21 DIAGNOSIS — R09.82 POST-NASAL DRIP: ICD-10-CM

## 2018-09-21 DIAGNOSIS — R05.9 COUGH: ICD-10-CM

## 2018-09-21 PROCEDURE — 99214 OFFICE O/P EST MOD 30 MIN: CPT | Performed by: FAMILY MEDICINE

## 2018-09-21 RX ORDER — FLUTICASONE FUROATE AND VILANTEROL 100; 25 UG/1; UG/1
1 POWDER RESPIRATORY (INHALATION) DAILY
Qty: 1 INHALER | Refills: 1 | Status: SHIPPED | OUTPATIENT
Start: 2018-09-21 | End: 2019-01-21

## 2018-09-21 RX ORDER — FLUTICASONE PROPIONATE 50 MCG
2 SPRAY, SUSPENSION (ML) NASAL DAILY
Qty: 16 G | Refills: 5 | Status: SHIPPED | OUTPATIENT
Start: 2018-09-21 | End: 2019-05-06 | Stop reason: SDUPTHER

## 2018-09-21 NOTE — PROGRESS NOTES
Assessment/Plan:To   Will cont pres rx  Add back breo and monitor  Renew flonase  F/u with dr Reece Alvarado to whom she was introduced      Diagnoses and all orders for this visit:    Benign essential hypertension    Post-nasal drip  -     fluticasone (FLONASE) 50 mcg/act nasal spray; 2 sprays into each nostril daily    Dyspnea on exertion  -     fluticasone-vilanterol (BREO ELLIPTA) 100-25 mcg/inh inhaler; Inhale 1 puff daily Rinse mouth after use  Cough  -     fluticasone-vilanterol (BREO ELLIPTA) 100-25 mcg/inh inhaler; Inhale 1 puff daily Rinse mouth after use  Obstructive sleep apnea          Subjective:      Patient ID: Dorcas Villanueva is a 69 y o  female  Patient seen in f/u  Finally over resp infections  Is noting an unusual feeling when she moveds  From inside to outside nusrat if humid  Not sure she feels like she can catch her breath  Is seeing pulm  Is using a silica product  And was told it was bad for her lungs   Takes a drink of water  And the coolness helps   Was taking breo but stopped it   Did get sec opinion re removal of iol  Advice was to get filter in a pair of glasses  Needs refill flonase and breo        The following portions of the patient's history were reviewed and updated as appropriate: past family history, past medical history, past social history and past surgical history  Review of Systems   Constitutional: Negative  HENT: Negative  Eyes:        See hpi   Respiratory:        See hpi   Cardiovascular: Negative  Gastrointestinal: Negative  Endocrine: Negative  Musculoskeletal: Negative  Skin: Negative  Neurological: Negative  Psychiatric/Behavioral: Negative  Objective:      /80 (BP Location: Left arm, Patient Position: Sitting, Cuff Size: Large)   Pulse 66   Temp (!) 97 2 °F (36 2 °C) (Tympanic)   Wt 91 4 kg (201 lb 6 4 oz)   SpO2 97%   BMI 35 68 kg/m²          Physical Exam   Constitutional: She is oriented to person, place, and time   She appears well-developed and well-nourished  HENT:   Head: Normocephalic and atraumatic  Right Ear: External ear normal    Left Ear: External ear normal    Eyes: Conjunctivae and EOM are normal  Pupils are equal, round, and reactive to light  Neck: Normal range of motion  Neck supple  Cardiovascular: Normal rate, regular rhythm and normal heart sounds  Pulmonary/Chest: Effort normal and breath sounds normal    Abdominal: Soft  Bowel sounds are normal    Musculoskeletal: Normal range of motion  Lymphadenopathy:     She has no cervical adenopathy  Neurological: She is alert and oriented to person, place, and time  Skin: Skin is warm  Psychiatric: She has a normal mood and affect   Her behavior is normal

## 2018-11-01 ENCOUNTER — TRANSCRIBE ORDERS (OUTPATIENT)
Dept: ADMINISTRATIVE | Facility: HOSPITAL | Age: 78
End: 2018-11-01

## 2018-11-01 DIAGNOSIS — Z12.39 SCREENING BREAST EXAMINATION: Primary | ICD-10-CM

## 2018-11-19 ENCOUNTER — HOSPITAL ENCOUNTER (OUTPATIENT)
Dept: RADIOLOGY | Facility: HOSPITAL | Age: 78
Discharge: HOME | End: 2018-11-19
Attending: OBSTETRICS & GYNECOLOGY
Payer: MEDICARE

## 2018-11-19 VITALS — BODY MASS INDEX: 35.44 KG/M2 | HEIGHT: 63 IN | WEIGHT: 200 LBS

## 2018-11-19 DIAGNOSIS — Z12.39 SCREENING BREAST EXAMINATION: ICD-10-CM

## 2018-11-19 PROCEDURE — 77067 SCR MAMMO BI INCL CAD: CPT

## 2018-11-19 PROCEDURE — 77063 BREAST TOMOSYNTHESIS BI: CPT

## 2018-11-29 ENCOUNTER — APPOINTMENT (EMERGENCY)
Dept: RADIOLOGY | Facility: HOSPITAL | Age: 78
End: 2018-11-29
Payer: MEDICARE

## 2018-11-29 ENCOUNTER — HOSPITAL ENCOUNTER (EMERGENCY)
Facility: HOSPITAL | Age: 78
Discharge: HOME | End: 2018-11-29
Attending: EMERGENCY MEDICINE | Admitting: EMERGENCY MEDICINE
Payer: MEDICARE

## 2018-11-29 VITALS
RESPIRATION RATE: 16 BRPM | TEMPERATURE: 98.7 F | DIASTOLIC BLOOD PRESSURE: 61 MMHG | SYSTOLIC BLOOD PRESSURE: 132 MMHG | OXYGEN SATURATION: 95 % | HEART RATE: 68 BPM

## 2018-11-29 DIAGNOSIS — T14.8XXA MUSCLE STRAIN: ICD-10-CM

## 2018-11-29 DIAGNOSIS — R10.9 ABDOMINAL PAIN: Primary | ICD-10-CM

## 2018-11-29 LAB
ALBUMIN SERPL BCP-MCNC: 3.4 G/DL (ref 3.5–5)
ALP SERPL-CCNC: 67 U/L (ref 46–116)
ALT SERPL W P-5'-P-CCNC: 23 U/L (ref 12–78)
ANION GAP SERPL CALCULATED.3IONS-SCNC: 8 MMOL/L (ref 4–13)
AST SERPL W P-5'-P-CCNC: 17 U/L (ref 5–45)
BASOPHILS # BLD AUTO: 0.04 THOUSANDS/ΜL (ref 0–0.1)
BASOPHILS NFR BLD AUTO: 1 % (ref 0–1)
BILIRUB SERPL-MCNC: 0.3 MG/DL (ref 0.2–1)
BUN SERPL-MCNC: 25 MG/DL (ref 5–25)
CALCIUM SERPL-MCNC: 8.9 MG/DL (ref 8.3–10.1)
CHLORIDE SERPL-SCNC: 106 MMOL/L (ref 100–108)
CO2 SERPL-SCNC: 28 MMOL/L (ref 21–32)
CREAT SERPL-MCNC: 1.13 MG/DL (ref 0.6–1.3)
EOSINOPHIL # BLD AUTO: 0.27 THOUSAND/ΜL (ref 0–0.61)
EOSINOPHIL NFR BLD AUTO: 3 % (ref 0–6)
ERYTHROCYTE [DISTWIDTH] IN BLOOD BY AUTOMATED COUNT: 17.2 % (ref 11.6–15.1)
GFR SERPL CREATININE-BSD FRML MDRD: 47 ML/MIN/1.73SQ M
GLUCOSE SERPL-MCNC: 123 MG/DL (ref 65–140)
HCT VFR BLD AUTO: 39.5 % (ref 34.8–46.1)
HGB BLD-MCNC: 12.6 G/DL (ref 11.5–15.4)
LYMPHOCYTES # BLD AUTO: 3.45 THOUSANDS/ΜL (ref 0.6–4.47)
LYMPHOCYTES NFR BLD AUTO: 41 % (ref 14–44)
MCH RBC QN AUTO: 20.8 PG (ref 26.8–34.3)
MCHC RBC AUTO-ENTMCNC: 31.9 G/DL (ref 31.4–37.4)
MCV RBC AUTO: 65 FL (ref 82–98)
MONOCYTES # BLD AUTO: 0.7 THOUSAND/ΜL (ref 0.17–1.22)
MONOCYTES NFR BLD AUTO: 8 % (ref 4–12)
NEUTROPHILS # BLD AUTO: 3.92 THOUSANDS/ΜL (ref 1.85–7.62)
NEUTS SEG NFR BLD AUTO: 47 % (ref 43–75)
PLATELET # BLD AUTO: 239 THOUSANDS/UL (ref 149–390)
PMV BLD AUTO: 9.5 FL (ref 8.9–12.7)
POTASSIUM SERPL-SCNC: 3.8 MMOL/L (ref 3.5–5.3)
PROT SERPL-MCNC: 7.3 G/DL (ref 6.4–8.2)
RBC # BLD AUTO: 6.05 MILLION/UL (ref 3.81–5.12)
SODIUM SERPL-SCNC: 142 MMOL/L (ref 136–145)
WBC # BLD AUTO: 8.38 THOUSAND/UL (ref 4.31–10.16)

## 2018-11-29 PROCEDURE — 36415 COLL VENOUS BLD VENIPUNCTURE: CPT | Performed by: EMERGENCY MEDICINE

## 2018-11-29 PROCEDURE — 71100 X-RAY EXAM RIBS UNI 2 VIEWS: CPT

## 2018-11-29 PROCEDURE — 96360 HYDRATION IV INFUSION INIT: CPT

## 2018-11-29 PROCEDURE — 74176 CT ABD & PELVIS W/O CONTRAST: CPT

## 2018-11-29 PROCEDURE — 80053 COMPREHEN METABOLIC PANEL: CPT | Performed by: EMERGENCY MEDICINE

## 2018-11-29 PROCEDURE — 99284 EMERGENCY DEPT VISIT MOD MDM: CPT

## 2018-11-29 PROCEDURE — 85025 COMPLETE CBC W/AUTO DIFF WBC: CPT | Performed by: EMERGENCY MEDICINE

## 2018-11-29 RX ORDER — DICYCLOMINE HYDROCHLORIDE 10 MG/1
20 CAPSULE ORAL ONCE
Status: COMPLETED | OUTPATIENT
Start: 2018-11-29 | End: 2018-11-29

## 2018-11-29 RX ADMIN — DICYCLOMINE HYDROCHLORIDE 20 MG: 10 CAPSULE ORAL at 23:35

## 2018-11-29 RX ADMIN — SODIUM CHLORIDE 1000 ML: 0.9 INJECTION, SOLUTION INTRAVENOUS at 22:35

## 2018-11-30 NOTE — DISCHARGE INSTRUCTIONS
Abdominal Pain   WHAT YOU NEED TO KNOW:   Abdominal pain can be dull, achy, or sharp  You may have pain in one area of your abdomen, or in your entire abdomen  Your pain may be caused by a condition such as constipation, food sensitivity or poisoning, infection, or a blockage  Abdominal pain can also be from a hernia, appendicitis, or an ulcer  Liver, gallbladder, or kidney conditions can also cause abdominal pain  The cause of your abdominal pain may be unknown  DISCHARGE INSTRUCTIONS:   Return to the emergency department if:   · You have new chest pain or shortness of breath  · You have pulsing pain in your upper abdomen or lower back that suddenly becomes constant  · Your pain is in the right lower abdominal area and worsens with movement  · You have a fever over 100 4°F (38°C) or shaking chills  · You are vomiting and cannot keep food or liquids down  · Your pain does not improve or gets worse over the next 8 to 12 hours  · You see blood in your vomit or bowel movements, or they look black and tarry  · Your skin or the whites of your eyes turn yellow  · You are a woman and have a large amount of vaginal bleeding that is not your monthly period  Contact your healthcare provider if:   · You have pain in your lower back  · You are a man and have pain in your testicles  · You have pain when you urinate  · You have questions or concerns about your condition or care  Follow up with your healthcare provider within 24 hours or as directed:  Write down your questions so you remember to ask them during your visits  Medicines:   · Medicines  may be given to calm your stomach and prevent vomiting or to decrease pain  Ask how to take pain medicine safely  · Take your medicine as directed  Contact your healthcare provider if you think your medicine is not helping or if you have side effects  Tell him of her if you are allergic to any medicine   Keep a list of the medicines, vitamins, and herbs you take  Include the amounts, and when and why you take them  Bring the list or the pill bottles to follow-up visits  Carry your medicine list with you in case of an emergency  © 2017 [employer] Information is for End User's use only and may not be sold, redistributed or otherwise used for commercial purposes  All illustrations and images included in CareNotes® are the copyrighted property of [employer]  or Jaiden Diaz  The above information is an  only  It is not intended as medical advice for individual conditions or treatments  Talk to your doctor, nurse or pharmacist before following any medical regimen to see if it is safe and effective for you

## 2018-11-30 NOTE — ED NOTES
Family at bedside  Pt states that she is comfortable with pain medication at discharge  Pt states pain is still sharp on side from time to time  Pt recommenced to take all other medications as directed       Aura Logan RN  11/29/18 6644

## 2018-11-30 NOTE — ED NOTES
Pt states pain started shortly after eating turkey soup for dinner  Pt denies trauma to rib or side       Lexy Santacruz RN  11/29/18 1862

## 2018-11-30 NOTE — ED PROVIDER NOTES
History  Chief Complaint   Patient presents with    Abdominal Pain     pt states she developed pain in her right abdomen under her ribs a few days ago while cleaning, but now it radiates around to her right flank     61-year-old white female presents with complaints of right upper quadrant abdominal pain that started a few days ago while she was cleaning  Patient also said she has was moving around a 20 lb turkey  No fever, no nausea, no vomiting, diarrhea, no prior history of same  No history of gallbladder disease, no hematuria, no dysuria, no frequency  Normal bowel and bladder  No chest pain, no shortness of breath  Pain is reproducible with movement and palpation  History provided by:  Patient  Abdominal Pain   Pain location:  RUQ  Pain quality: aching    Pain radiates to:  R flank  Pain severity:  Moderate  Onset quality:  Gradual  Duration:  3 days  Timing:  Constant  Progression:  Unchanged  Chronicity:  New  Context: previous surgery    Context: not alcohol use, not diet changes, not laxative use, not recent travel, not sick contacts, not suspicious food intake and not trauma    Relieved by:  Nothing  Worsened by: Movement and palpation  Ineffective treatments:  None tried  Associated symptoms: no chills, no cough, no diarrhea, no dysuria, no fever, no hematuria, no nausea, no shortness of breath, no sore throat and no vomiting    Risk factors: no alcohol abuse and not elderly        Prior to Admission Medications   Prescriptions Last Dose Informant Patient Reported? Taking?    Naproxen Sodium (ALEVE) 220 MG CAPS  Self Yes No   Sig: Take by mouth   aspirin 81 mg chewable tablet  Self Yes No   Sig: Chew 1 tablet daily   b complex vitamins tablet  Self Yes No   Sig: Take 1 tablet by mouth daily   fluticasone (FLONASE) 50 mcg/act nasal spray   No No   Si sprays into each nostril daily   fluticasone-vilanterol (BREO ELLIPTA) 100-25 mcg/inh inhaler   No No   Sig: Inhale 1 puff daily Rinse mouth after use    hydrochlorothiazide (HYDRODIURIL) 12 5 mg tablet  Self Yes No   Sig: Take 1 tablet by mouth Daily   nystatin-triamcinolone (MYCOLOG-II) cream  Self Yes No   Sig: Apply topically   sodium chloride (AYR SALINE NASAL) nasal gel  Self Yes No   Si application into each nostril every hour as needed      Facility-Administered Medications: None       Past Medical History:   Diagnosis Date    Anxiety     Bleeding from varicose vein     Environmental allergies     Fracture of radius     Obesity     Scoliosis     mild     Vaginitis     Vulvovaginitis        Past Surgical History:   Procedure Laterality Date    EYE SURGERY      cataract       Family History   Problem Relation Age of Onset    Colon cancer Maternal Aunt      I have reviewed and agree with the history as documented  Social History   Substance Use Topics    Smoking status: Never Smoker    Smokeless tobacco: Never Used    Alcohol use Yes      Comment: social         Review of Systems   Constitutional: Negative  Negative for chills and fever  HENT: Negative  Negative for sore throat  Respiratory: Negative for cough and shortness of breath  Cardiovascular: Negative  Negative for palpitations and leg swelling  Gastrointestinal: Positive for abdominal pain  Negative for blood in stool, diarrhea, nausea and vomiting  Genitourinary: Negative for dysuria and hematuria  Musculoskeletal: Negative  Skin: Negative  Negative for wound  Neurological: Negative  Hematological: Negative  Psychiatric/Behavioral: Negative  All other systems reviewed and are negative  Physical Exam  Physical Exam   Constitutional: She is oriented to person, place, and time  She appears well-developed and well-nourished  HENT:   Head: Normocephalic and atraumatic     Right Ear: External ear normal    Left Ear: External ear normal    Nose: Nose normal    Mouth/Throat: Oropharynx is clear and moist    Eyes: Pupils are equal, round, and reactive to light  Conjunctivae and EOM are normal    Neck: Normal range of motion  Neck supple  Cardiovascular: Normal rate, regular rhythm, normal heart sounds and intact distal pulses  Pulmonary/Chest: Effort normal and breath sounds normal    Abdominal: Soft  Bowel sounds are normal  There is no tenderness  Musculoskeletal: Normal range of motion  Neurological: She is alert and oriented to person, place, and time  Skin: Skin is warm and dry  Capillary refill takes less than 2 seconds  Psychiatric: She has a normal mood and affect  Her behavior is normal  Judgment and thought content normal    Nursing note and vitals reviewed        Vital Signs  ED Triage Vitals   Temperature Pulse Respirations Blood Pressure SpO2   11/29/18 2159 11/29/18 2159 11/29/18 2159 11/29/18 2159 11/29/18 2159   98 5 °F (36 9 °C) 74 18 (!) 171/81 96 %      Temp Source Heart Rate Source Patient Position - Orthostatic VS BP Location FiO2 (%)   11/29/18 2159 11/29/18 2159 11/29/18 2159 11/29/18 2159 --   Tympanic Monitor Lying Right arm       Pain Score       11/29/18 2332       8           Vitals:    11/29/18 2159 11/29/18 2332   BP: (!) 171/81 132/61   Pulse: 74 68   Patient Position - Orthostatic VS: Lying        Visual Acuity      ED Medications  Medications   sodium chloride 0 9 % bolus 1,000 mL (0 mL Intravenous Stopped 11/29/18 2338)   dicyclomine (BENTYL) capsule 20 mg (20 mg Oral Given 11/29/18 2335)       Diagnostic Studies  Results Reviewed     Procedure Component Value Units Date/Time    Comprehensive metabolic panel [[id number]]  (Abnormal) Collected:  11/29/18 2206    Lab Status:  Final result Specimen:  Blood from Arm, Right Updated:  11/29/18 2227     Sodium 142 mmol/L      Potassium 3 8 mmol/L      Chloride 106 mmol/L      CO2 28 mmol/L      ANION GAP 8 mmol/L      BUN 25 mg/dL      Creatinine 1 13 mg/dL      Glucose 123 mg/dL      Calcium 8 9 mg/dL      AST 17 U/L      ALT 23 U/L      Alkaline Phosphatase 67 U/L      Total Protein 7 3 g/dL      Albumin 3 4 (L) g/dL      Total Bilirubin 0 30 mg/dL      eGFR 47 ml/min/1 73sq m     Narrative:         National Kidney Disease Education Program recommendations are as follows:  GFR calculation is accurate only with a steady state creatinine  Chronic Kidney disease less than 60 ml/min/1 73 sq  meters  Kidney failure less than 15 ml/min/1 73 sq  meters  CBC and differential [[id number]]  (Abnormal) Collected:  11/29/18 2206    Lab Status:  Final result Specimen:  Blood from Arm, Right Updated:  11/29/18 2212     WBC 8 38 Thousand/uL      RBC 6 05 (H) Million/uL      Hemoglobin 12 6 g/dL      Hematocrit 39 5 %      MCV 65 (L) fL      MCH 20 8 (L) pg      MCHC 31 9 g/dL      RDW 17 2 (H) %      MPV 9 5 fL      Platelets 289 Thousands/uL      Neutrophils Relative 47 %      Lymphocytes Relative 41 %      Monocytes Relative 8 %      Eosinophils Relative 3 %      Basophils Relative 1 %      Neutrophils Absolute 3 92 Thousands/µL      Lymphocytes Absolute 3 45 Thousands/µL      Monocytes Absolute 0 70 Thousand/µL      Eosinophils Absolute 0 27 Thousand/µL      Basophils Absolute 0 04 Thousands/µL                  XR ribs 2 views RIGHT   Final Result by Isabella Garner MD (11/30 6753)      No active cardiopulmonary disease  No evidence of rib fractures  Workstation performed: [id number]         CT renal stone study abdomen pelvis wo contrast   Final Result by Nagi Smith MD (11/29 6844)      1  No urolithiasis, hydronephrosis, or other acute abdominopelvic findings  2   Extensive renal artery ostial atherosclerosis  Otherwise, vasculopathy is minimal   Correlate for clinical features of renal artery stenosis        Workstation performed: [id number]                    Procedures  Procedures       Phone Contacts  ED Phone Contact    ED Course                               MDM  CritCare Time    Disposition  Final diagnoses:   Abdominal pain   Muscle strain - Right sided abdominal wall     Time reflects when diagnosis was documented in both MDM as applicable and the Disposition within this note     Time User Action Codes Description Comment    11/29/2018 11:00 PM Myah Young Add [R10 9] Abdominal pain     11/29/2018 11:00 PM Juniata Rise  8XXA] Muscle strain     11/29/2018 11:00 PM Oksana John  8XXA] Muscle strain Right sided abdominal wall      ED Disposition     ED Disposition Condition Comment    Discharge  Khadra Anderson discharge to home/self care  Condition at discharge: Stable        Follow-up Information     Follow up With Specialties Details Why Contact Info    Emely Snow MD Family Medicine Schedule an appointment as soon as possible for a visit in 3 days  [address]  [phone]            Discharge Medication List as of 11/29/2018 11:01 PM      CONTINUE these medications which have NOT CHANGED    Details   aspirin 81 mg chewable tablet Chew 1 tablet daily, Starting Mon 1/15/2018, Historical Med      b complex vitamins tablet Take 1 tablet by mouth daily, Historical Med      fluticasone (FLONASE) 50 mcg/act nasal spray 2 sprays into each nostril daily, Starting Fri 9/21/2018, Normal      fluticasone-vilanterol (BREO ELLIPTA) 100-25 mcg/inh inhaler Inhale 1 puff daily Rinse mouth after use , Starting Fri 9/21/2018, Normal      hydrochlorothiazide (HYDRODIURIL) 12 5 mg tablet Take 1 tablet by mouth Daily, Starting Mon 1/15/2018, Historical Med      Naproxen Sodium (ALEVE) 220 MG CAPS Take by mouth, Historical Med      nystatin-triamcinolone (MYCOLOG-II) cream Apply topically, Historical Med      sodium chloride (AYR SALINE NASAL) nasal gel 1 application into each nostril every hour as needed, Historical Med           No discharge procedures on file      ED Provider  Electronically Signed by           Felisha Healy MD  12/23/18 6757

## 2019-01-21 ENCOUNTER — OFFICE VISIT (OUTPATIENT)
Dept: CARDIOLOGY CLINIC | Facility: CLINIC | Age: 79
End: 2019-01-21
Payer: MEDICARE

## 2019-01-21 VITALS
HEIGHT: 62 IN | SYSTOLIC BLOOD PRESSURE: 126 MMHG | HEART RATE: 63 BPM | WEIGHT: 200 LBS | DIASTOLIC BLOOD PRESSURE: 78 MMHG | BODY MASS INDEX: 36.8 KG/M2 | OXYGEN SATURATION: 98 %

## 2019-01-21 DIAGNOSIS — G47.33 OBSTRUCTIVE SLEEP APNEA: ICD-10-CM

## 2019-01-21 DIAGNOSIS — E66.9 CLASS 2 OBESITY: ICD-10-CM

## 2019-01-21 DIAGNOSIS — I49.3 ASYMPTOMATIC PVCS: ICD-10-CM

## 2019-01-21 DIAGNOSIS — I49.1 PREMATURE ATRIAL CONTRACTIONS: ICD-10-CM

## 2019-01-21 DIAGNOSIS — I10 ESSENTIAL HYPERTENSION: Primary | ICD-10-CM

## 2019-01-21 DIAGNOSIS — E03.9 ACQUIRED HYPOTHYROIDISM: ICD-10-CM

## 2019-01-21 DIAGNOSIS — I11.9 HYPERTENSIVE HEART DISEASE WITHOUT HEART FAILURE: ICD-10-CM

## 2019-01-21 PROCEDURE — 99214 OFFICE O/P EST MOD 30 MIN: CPT | Performed by: INTERNAL MEDICINE

## 2019-01-21 PROCEDURE — 93000 ELECTROCARDIOGRAM COMPLETE: CPT | Performed by: INTERNAL MEDICINE

## 2019-01-21 NOTE — PATIENT INSTRUCTIONS
1  Weight loss was counseled  2  Continue current medication  3  Cardiology follow-up in 6 months with EKG, lipids, CMP

## 2019-01-21 NOTE — PROGRESS NOTES
Cardiology Progress Note    ASSESSMENT:    1   No active cardiac disease with history of intermittent palpitations  2   Well controlled essential hypertension  3  Resolved lower extremity edema  4  Treated and controlled obstructive sleep apnea  5  History of pneumonia and tracheobronchitis with postinfectious cough beginning end of January, 2018, now resolved  6   Subclinical mild hypothyroidism with TSH of 3 931 on 06/08/2018   7   Class 2 obesity, stable, as cause of intermittent exertional dyspnea and pedal edema  8  Low RBC indices, 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 thalassemia minor, with some family history of thalassemia  9   History of mildly abnormal nuclear stress test with likely artifactual apical perfusion defect on 12/27/2017   10   History of infrequent benign premature atrial contractions and rare asymptomatic PVCs on Holter monitor 11/06/2017  11  Benign echocardiogram consistent with hypertensive heart disease of a mild degree on 10/24/2017  12   Right visual light sensitivity following intra-ocular lens implant 2 years ago  Plan       Patient Instructions     1  Weight loss was counseled  2  Continue current medication  3  Cardiology follow-up in 6 months with EKG, lipids, CMP  HPI    This 69 y o  female  denies new cardiopulmonary and medical symptoms  She believe she is slowing down  She does experience intermittent exertional dyspnea with activity with associated lightheadedness, has a stable degree of left more than right pedal edema, occasional skipping of her heart or flutters in her chest at bedtime, stable chronic orthopnea for 1-2 years, faithful use of her CPAP for her obstructive sleep apnea  She denies chest pain, PND, syncope, presyncope, cough, sputum, fever, chills  She states that she sleeps quite well  The patient currently works 3 days a week for 5 her 6 hours as a       The patient developed an issue with increased light sensitivity and brightness in her right visual field following intra-ocular lens implant about 2 years ago after cataract extraction  She had a change in her eye glass prescription, which is helping with the issue and has declined the use of medication to reduce her right pupil size, which was recommended by a specialist at [facility]   Because of the FPC of Dr Ramon Tarango, the patient is planning to switch her primary care to [facility]  The patient had an emergency department visit at [facility] on 11/29/2018 with right upper quadrant abdominal pain thought to be muscular in origin  Her workup was negative for any acute process  Review of Systems    All other systems negative, except as noted in history of present illness    Historical Information   Past Medical History:   Diagnosis Date    Anxiety     Bleeding from varicose vein     Environmental allergies     Fracture of radius     Obesity     Scoliosis     mild     Vaginitis     Vulvovaginitis      Past Surgical History:   Procedure Laterality Date    EYE SURGERY      cataract     History   Alcohol Use    Yes     Comment: social      History   Drug Use No     History   Smoking Status    Never Smoker   Smokeless Tobacco    Never Used       Family History:  Family History   Problem Relation Age of Onset    Colon cancer Maternal Aunt          Meds/Allergies     Prior to Admission medications    Medication Sig Start Date End Date Taking?  Authorizing Provider   aspirin 81 mg chewable tablet Chew 1 tablet daily 1/15/18  Yes Historical Provider, MD   b complex vitamins tablet Take 1 tablet by mouth daily   Yes Historical Provider, MD   fluticasone (FLONASE) 50 mcg/act nasal spray 2 sprays into each nostril daily 9/21/18  Yes Pablo Martin MD   Naproxen Sodium (ALEVE) 220 MG CAPS Take 1 capsule by mouth daily     Yes Historical Provider, MD   nystatin-triamcinolone (MYCOLOG-II) cream Apply topically as needed     Yes Historical Provider, MD   sodium chloride (AYR SALINE NASAL) nasal gel 1 application into each nostril every hour as needed   Yes Historical Provider, MD   fluticasone-vilanterol (BREO ELLIPTA) 100-25 mcg/inh inhaler Inhale 1 puff daily Rinse mouth after use  Patient not taking: Reported on 1/21/2019 9/21/18 1/21/19  Greg Hernandez MD   hydrochlorothiazide (HYDRODIURIL) 12 5 mg tablet Take 1 tablet by mouth Daily 1/15/18 1/21/19  Historical Provider, MD       Allergies   Allergen Reactions    Penicillins     Sulfa Antibiotics          Vitals:    01/21/19 1419   BP: 126/78   BP Location: Left arm   Patient Position: Sitting   Cuff Size: Large   Pulse: 63   SpO2: 98%   Weight: 90 7 kg (200 lb)   Height: 5' 1 5" (1 562 m)       Body mass index is 37 18 kg/m²  0 8 pound increase in approximately 6-1/2 months      Physical Exam:    General Appearance:  Alert, cooperative, no distress, appears stated age and is markedly obese   Head:  Normocephalic, without obvious abnormality, atraumatic   Eyes:  PERRL, conjunctiva/corneas clear, EOM's intact,   both eyes   Ears:  Normal TM's and external ear canals, both ears   Nose: Nares normal, septum midline, mucosa normal, no drainage or sinus tenderness   Throat: Lips, mucosa, and tongue normal; teeth and gums normal   Neck: Supple, symmetrical, trachea midline, no adenopathy, thyroid: not enlarged, symmetric, no tenderness/mass/nodules, no carotid bruit or JVD   Back:   Symmetric, no curvature, ROM normal, no CVA tenderness   Lungs:   Clear to auscultation bilaterally, respirations unlabored   Chest Wall:  No tenderness or deformity   Heart:  Regular rate and rhythm, S1, S2 normal, no murmur, rub or gallop   Abdomen:   Soft, non-tender, bowel sounds active all four quadrants,  no masses, no organomegaly with marked obesity noted   Extremities: Extremities normal, atraumatic, no cyanosis or edema   Pulses: 2+ and symmetric   Skin: Skin showed normal color, texture, turgor and no rashes or lesions   Lymph nodes: Cervical, supraclavicular, and axillary nodes normal   Neurologic: Normal         Cardiographics    ECG  01/21/2019:    Normal EKG with early precordial RS transition  Unchanged from 07/09/2018    Imaging    Chest X-Ray :  Xr Chest Pa & Lateral    Result Date: 4/16/2018  Impression No acute cardiopulmonary disease  Workstation performed: [id number]     Xr Chest Pa & Lateral    Result Date: 2/6/2018  Impression Vague left basilar infiltrate  The study was marked in [facility] for immediate notification   Workstation performed: [id number]             Lab Review       Lab Results   Component Value Date    SODIUM 142 11/29/2018    K 3 8 11/29/2018     11/29/2018    CO2 28 11/29/2018    BUN 25 11/29/2018    CREATININE 1 13 11/29/2018    GLUF 97 06/08/2018    CALCIUM 8 9 11/29/2018    AST 17 11/29/2018    ALT 23 11/29/2018    ALKPHOS 67 11/29/2018    EGFR 47 11/29/2018   Glucose, random 11/29/2018:  123  CBC 11/29/2018:  H/H-12 6/39 5 with very low MCV, MCH, and elevated RDW; normal WBC and platelets    Lab Results   Component Value Date    CHOLESTEROL 178 06/08/2018     Lab Results   Component Value Date    HDL 61 (H) 06/08/2018     No results found for: LDLCHOLEST  Lab Results   Component Value Date    LDLCALC 104 (H) 06/08/2018     No components found for: [facility]  Lab Results   Component Value Date    TRIG 67 06/08/2018         Lab Results   Component Value Date    CALCIUM 8 9 11/29/2018    K 3 8 11/29/2018    CO2 28 11/29/2018     11/29/2018    BUN 25 11/29/2018    CREATININE 1 13 11/29/2018           Vanesa Garcia MD

## 2019-01-29 ENCOUNTER — OFFICE VISIT (OUTPATIENT)
Dept: PULMONOLOGY | Facility: MEDICAL CENTER | Age: 79
End: 2019-01-29
Payer: MEDICARE

## 2019-01-29 VITALS
RESPIRATION RATE: 12 BRPM | BODY MASS INDEX: 36.8 KG/M2 | HEART RATE: 85 BPM | HEIGHT: 62 IN | TEMPERATURE: 97.5 F | DIASTOLIC BLOOD PRESSURE: 68 MMHG | WEIGHT: 200 LBS | SYSTOLIC BLOOD PRESSURE: 136 MMHG | OXYGEN SATURATION: 98 %

## 2019-01-29 DIAGNOSIS — G47.33 OBSTRUCTIVE SLEEP APNEA: Primary | ICD-10-CM

## 2019-01-29 PROCEDURE — 99213 OFFICE O/P EST LOW 20 MIN: CPT | Performed by: INTERNAL MEDICINE

## 2019-01-29 NOTE — PROGRESS NOTES
Assessment/Plan:     Problem List Items Addressed This Visit        Respiratory    Obstructive sleep apnea - Primary     Patient is using and benefiting from the use of her CPAP  Consistent use is encouraged  Periodic mask, tubing, filter replacement every 1-3 months is advised and periodic  approximately every year is advised  Uses CPAP during all sleep periods is discussed  Avoidance of sedatives, supine sleep, alcohol, narcotics in the setting of untreated sleep apnea is discussed  Absolute avoidance of driving while drowsy is also discussed  Avoid weight gain/encourage weight loss  F/u 3 months  All questions are answered to the patient's satisfaction and understanding  She verbalizes understanding  She is encouraged to call with any further questions or concerns  Portions of the record may have been created with voice recognition software  Occasional wrong word or "sound a like" substitutions may have occurred due to the inherent limitations of voice recognition software  Read the chart carefully and recognize, using context, where substitutions have occurred  Electronically Signed by Landry Patel MD    ______________________________________________________________________    Chief Complaint:   Chief Complaint   Patient presents with    Sleep Apnea     no issues with machine   Shortness of Breath     pt states better       Patient ID: Dayday Hahn is a 69 y o  y o  female has a past medical history of Anxiety; Bleeding from varicose vein; Environmental allergies; Fracture of radius; Obesity; Scoliosis; Vaginitis; and Vulvovaginitis  1/29/2019  Patient presents today for follow-up visit  She has not been using the CPAP as much  She does benefit from the use of it  She denies any difficulty with the mask or the machine    Occasional post nasal drip-uses Mucinex  Having joint pains- using Aleve  Breathing is much improved       HPI    Review of Systems   All other systems reviewed and are negative  Smoking history: She reports that she has never smoked  She has never used smokeless tobacco       Immunization History   Administered Date(s) Administered    Pneumococcal Polysaccharide PPV23 10/01/2006     Current Outpatient Prescriptions   Medication Sig Dispense Refill    aspirin 81 mg chewable tablet Chew 1 tablet daily      b complex vitamins tablet Take 1 tablet by mouth daily      fluticasone (FLONASE) 50 mcg/act nasal spray 2 sprays into each nostril daily 16 g 5    Naproxen Sodium (ALEVE) 220 MG CAPS Take 1 capsule by mouth daily        nystatin-triamcinolone (MYCOLOG-II) cream Apply topically as needed        sodium chloride (AYR SALINE NASAL) nasal gel 1 application into each nostril every hour as needed       No current facility-administered medications for this visit  Allergies: Penicillins and Sulfa antibiotics    Objective:  Vitals:    01/29/19 0824   BP: 136/68   BP Location: Left arm   Patient Position: Sitting   Cuff Size: Large   Pulse: 85   Resp: 12   Temp: 97 5 °F (36 4 °C)   TempSrc: Tympanic   SpO2: 98%   Weight: 90 7 kg (200 lb)   Height: 5' 1 5" (1 562 m)   Oxygen Therapy  SpO2: 98 %    Wt Readings from Last 3 Encounters:   01/29/19 90 7 kg (200 lb)   01/21/19 90 7 kg (200 lb)   11/19/18 90 7 kg (200 lb)     Body mass index is 37 18 kg/m²  Physical Exam   Constitutional: She is oriented to person, place, and time  She appears well-developed and well-nourished  No distress  HENT:   Head: Normocephalic and atraumatic  Mouth/Throat: Oropharynx is clear and moist  No oropharyngeal exudate  Eyes: Pupils are equal, round, and reactive to light  EOM are normal    Neck: Normal range of motion  Neck supple  Cardiovascular: Normal rate and regular rhythm  No murmur heard  Pulmonary/Chest: Effort normal and breath sounds normal  No respiratory distress  She has no wheezes  She has no rales   She exhibits no tenderness  Abdominal: Soft  Bowel sounds are normal  She exhibits no distension  There is no tenderness  Musculoskeletal: Normal range of motion  She exhibits no edema  Lymphadenopathy:     She has no cervical adenopathy  Neurological: She is alert and oriented to person, place, and time  No cranial nerve deficit  Skin: Skin is warm and dry  She is not diaphoretic  Psychiatric: She has a normal mood and affect  Her behavior is normal    Vitals reviewed        Lab Review:   Reviewed

## 2019-01-29 NOTE — ASSESSMENT & PLAN NOTE
Patient is using and benefiting from the use of her CPAP  Consistent use is encouraged  Periodic mask, tubing, filter replacement every 1-3 months is advised and periodic  approximately every year is advised  Uses CPAP during all sleep periods is discussed  Avoidance of sedatives, supine sleep, alcohol, narcotics in the setting of untreated sleep apnea is discussed  Absolute avoidance of driving while drowsy is also discussed  Avoid weight gain/encourage weight loss

## 2019-02-20 NOTE — PROGRESS NOTES
Subjective:      Patient ID: Tello Cheung is a 69 y o  female  Chief Complaint   Patient presents with    NP     establish care, thyroid checkup    Possible UTI     Lower back sensitivity, fowl smelling urine        NP, here to establish  Sees Dr Carolynn Hayes regularly and he told her she needs recheck bloodwork for mildly elevated TSH, would like us to order  Feels well, no fatigue, no weight gain or constipation  Has had strong smelling urine and urinary frequency for past couple of weeks  Has some leakage if she coughs or sneezes but this is manageable and she uses a pad  She does drink a lot of water  The following portions of the patient's history were reviewed and updated as appropriate: allergies, current medications, past family history, past medical history, past social history, past surgical history and problem list     Review of Systems   Constitutional: Negative  Respiratory: Negative  Cardiovascular: Negative  Current Outpatient Medications   Medication Sig Dispense Refill    aspirin 81 mg chewable tablet Chew 1 tablet daily      b complex vitamins tablet Take 1 tablet by mouth daily      fluticasone (FLONASE) 50 mcg/act nasal spray 2 sprays into each nostril daily 16 g 5    Naproxen Sodium (ALEVE) 220 MG CAPS Take 1 capsule by mouth daily        nystatin-triamcinolone (MYCOLOG-II) cream Apply topically as needed        sodium chloride (AYR SALINE NASAL) nasal gel 1 application into each nostril every hour as needed      ciprofloxacin (CIPRO) 250 mg tablet Take 1 tablet (250 mg total) by mouth every 12 (twelve) hours for 3 days 6 tablet 0    Zoster Vac Recomb Adjuvanted 50 MCG/0 5ML SUSR Inject 0 5 mL into a muscle once for 1 dose 1 each 1     No current facility-administered medications for this visit          Objective:    /70   Pulse 76   Temp (!) 97 3 °F (36 3 °C)   Resp 16   Ht 5' 1 5" (1 562 m)   Wt 89 8 kg (198 lb)   BMI 36 81 kg/m² Physical Exam   Constitutional: She appears well-developed and well-nourished  Eyes: Conjunctivae are normal    Neck: Neck supple  No JVD present  No thyromegaly present  Cardiovascular: Normal rate, regular rhythm, normal heart sounds and intact distal pulses  Exam reveals no gallop and no friction rub  No murmur heard  Pulmonary/Chest: Effort normal and breath sounds normal  She has no wheezes  She has no rales  Abdominal: Soft  Bowel sounds are normal  She exhibits no distension  There is no tenderness  Musculoskeletal: She exhibits no edema  Assessment/Plan:    Benign essential hypertension  On no medications, follows with Dr Travis Solitario every 6 months  Advised on low salt diet and exercise/weight loss  Obstructive sleep apnea  She is compliant with her CPAP and follows regularly with Dr Jeovanny Wright  Diagnoses and all orders for this visit:    Medicare annual wellness visit, subsequent    Benign essential hypertension    Acute cystitis with hematuria  -     POCT urine dip auto non-scope  -     Urine culture  -     ciprofloxacin (CIPRO) 250 mg tablet; Take 1 tablet (250 mg total) by mouth every 12 (twelve) hours for 3 days    BMI 36 0-36 9,adult    Obstructive sleep apnea    Abnormal thyroid blood test  -     TSH, 3rd generation with Free T4 reflex; Future    Need for vaccination  -     PNEUMOCOCCAL CONJUGATE VACCINE 13-VALENT GREATER THAN 6 MONTHS  -     Zoster Vac Recomb Adjuvanted 50 MCG/0 5ML SUSR; Inject 0 5 mL into a muscle once for 1 dose          Return in about 6 months (around 8/25/2019)         Mandy Paredes MD

## 2019-02-25 ENCOUNTER — OFFICE VISIT (OUTPATIENT)
Dept: FAMILY MEDICINE CLINIC | Facility: CLINIC | Age: 79
End: 2019-02-25
Payer: MEDICARE

## 2019-02-25 VITALS
DIASTOLIC BLOOD PRESSURE: 70 MMHG | SYSTOLIC BLOOD PRESSURE: 130 MMHG | HEART RATE: 76 BPM | RESPIRATION RATE: 16 BRPM | HEIGHT: 62 IN | BODY MASS INDEX: 36.44 KG/M2 | TEMPERATURE: 97.3 F | WEIGHT: 198 LBS

## 2019-02-25 DIAGNOSIS — N30.01 ACUTE CYSTITIS WITH HEMATURIA: ICD-10-CM

## 2019-02-25 DIAGNOSIS — R79.89 ABNORMAL THYROID BLOOD TEST: ICD-10-CM

## 2019-02-25 DIAGNOSIS — Z23 NEED FOR VACCINATION: ICD-10-CM

## 2019-02-25 DIAGNOSIS — Z00.00 MEDICARE ANNUAL WELLNESS VISIT, SUBSEQUENT: Primary | ICD-10-CM

## 2019-02-25 DIAGNOSIS — I10 BENIGN ESSENTIAL HYPERTENSION: ICD-10-CM

## 2019-02-25 DIAGNOSIS — G47.33 OBSTRUCTIVE SLEEP APNEA: ICD-10-CM

## 2019-02-25 LAB
SL AMB  POCT GLUCOSE, UA: ABNORMAL
SL AMB LEUKOCYTE ESTERASE,UA: ABNORMAL
SL AMB POCT BILIRUBIN,UA: ABNORMAL
SL AMB POCT BLOOD,UA: 50
SL AMB POCT CLARITY,UA: ABNORMAL
SL AMB POCT COLOR,UA: YELLOW
SL AMB POCT KETONES,UA: ABNORMAL
SL AMB POCT NITRITE,UA: ABNORMAL
SL AMB POCT PH,UA: 5
SL AMB POCT SPECIFIC GRAVITY,UA: 1020
SL AMB POCT URINE PROTEIN: ABNORMAL
SL AMB POCT UROBILINOGEN: ABNORMAL

## 2019-02-25 PROCEDURE — 81003 URINALYSIS AUTO W/O SCOPE: CPT | Performed by: INTERNAL MEDICINE

## 2019-02-25 PROCEDURE — G0439 PPPS, SUBSEQ VISIT: HCPCS | Performed by: INTERNAL MEDICINE

## 2019-02-25 PROCEDURE — 87086 URINE CULTURE/COLONY COUNT: CPT | Performed by: INTERNAL MEDICINE

## 2019-02-25 PROCEDURE — G0009 ADMIN PNEUMOCOCCAL VACCINE: HCPCS

## 2019-02-25 PROCEDURE — 87077 CULTURE AEROBIC IDENTIFY: CPT | Performed by: INTERNAL MEDICINE

## 2019-02-25 PROCEDURE — 87186 SC STD MICRODIL/AGAR DIL: CPT | Performed by: INTERNAL MEDICINE

## 2019-02-25 PROCEDURE — 90670 PCV13 VACCINE IM: CPT

## 2019-02-25 PROCEDURE — 99213 OFFICE O/P EST LOW 20 MIN: CPT | Performed by: INTERNAL MEDICINE

## 2019-02-25 RX ORDER — CIPROFLOXACIN 250 MG/1
250 TABLET, FILM COATED ORAL EVERY 12 HOURS SCHEDULED
Qty: 6 TABLET | Refills: 0 | Status: SHIPPED | OUTPATIENT
Start: 2019-02-25 | End: 2019-02-28

## 2019-02-25 NOTE — PROGRESS NOTES
2  Assessment and Plan:    Problem List Items Addressed This Visit        Respiratory    Obstructive sleep apnea     She is compliant with her CPAP and follows regularly with Dr Lo Garza  Cardiovascular and Mediastinum    Benign essential hypertension     On no medications, follows with Dr Marina Davis every 6 months  Advised on low salt diet and exercise/weight loss  Other    BMI 36 0-36 9,adult      Other Visit Diagnoses     Medicare annual wellness visit, subsequent    -  Primary    Acute cystitis with hematuria        Relevant Medications    ciprofloxacin (CIPRO) 250 mg tablet    Other Relevant Orders    POCT urine dip auto non-scope (Completed)    Urine culture    Abnormal thyroid blood test        Relevant Orders    TSH, 3rd generation with Free T4 reflex    Need for vaccination        Relevant Medications    Zoster Vac Recomb Adjuvanted 50 MCG/0 5ML SUSR    Other Relevant Orders    PNEUMOCOCCAL CONJUGATE VACCINE 13-VALENT GREATER THAN 6 MONTHS (Completed)        Health Maintenance Due   Topic Date Due    Medicare Annual Wellness Visit (AWV)  1940    BMI: Followup Plan  04/30/1959    DTaP,Tdap,and Td Vaccines (1 - Tdap) 04/30/1962    Urinary Incontinence Screening  04/30/2006    Pneumococcal PPSV23/PCV13 65+ Years / Low and Medium Risk (2 of 2 - PCV13) 10/01/2007    INFLUENZA VACCINE  07/01/2018         HPI:  Zaria Kothari is a 69 y o  female here for her Subsequent Wellness Visit      Patient Active Problem List   Diagnosis    Abnormal stress ECG with treadmill    Moeller esophagus    Benign essential hypertension    Bilateral carotid artery stenosis    Cataract    Colon, diverticulosis    Costochondritis    Generalized osteoarthritis of multiple sites    Obstructive sleep apnea    Dyspnea on exertion    BMI 36 0-36 9,adult     Past Medical History:   Diagnosis Date    Anxiety     Bleeding from varicose vein     Environmental allergies     Fracture of radius  Obesity     Scoliosis     mild     Vaginitis     Vulvovaginitis      Past Surgical History:   Procedure Laterality Date    EYE SURGERY      cataract    TUBAL LIGATION       Family History   Problem Relation Age of Onset    Colon cancer Maternal Aunt     Stroke Mother      Social History     Tobacco Use   Smoking Status Never Smoker   Smokeless Tobacco Never Used     Social History     Substance and Sexual Activity   Alcohol Use Yes    Comment: social       Social History     Substance and Sexual Activity   Drug Use No       Current Outpatient Medications   Medication Sig Dispense Refill    aspirin 81 mg chewable tablet Chew 1 tablet daily      b complex vitamins tablet Take 1 tablet by mouth daily      fluticasone (FLONASE) 50 mcg/act nasal spray 2 sprays into each nostril daily 16 g 5    Naproxen Sodium (ALEVE) 220 MG CAPS Take 1 capsule by mouth daily        nystatin-triamcinolone (MYCOLOG-II) cream Apply topically as needed        sodium chloride (AYR SALINE NASAL) nasal gel 1 application into each nostril every hour as needed      ciprofloxacin (CIPRO) 250 mg tablet Take 1 tablet (250 mg total) by mouth every 12 (twelve) hours for 3 days 6 tablet 0    Zoster Vac Recomb Adjuvanted 50 MCG/0 5ML SUSR Inject 0 5 mL into a muscle once for 1 dose 1 each 1     No current facility-administered medications for this visit  Allergies   Allergen Reactions    Penicillins     Sulfa Antibiotics      Immunization History   Administered Date(s) Administered    Pneumococcal Conjugate 13-Valent 02/25/2019    Pneumococcal Polysaccharide PPV23 10/01/2006       Patient Care Team:  Vince Soliman MD as PCP - General (Internal Medicine)  Daren Draper MD    Medicare Screening Tests and Risk Assessments:  Dayday Hahn is here for her Subsequent Wellness visit  Health Risk Assessment:  Patient rates overall health as very good  Patient feels that their physical health rating is Slightly worse   Eyesight was rated as Same  Hearing was rated as Same  Patient feels that their emotional and mental health rating is Same  Pain experienced by patient in the last 7 days has been Some  Patient's pain rating has been 4/10  Patient states that she has experienced no weight loss or gain in last 6 months  Emotional/Mental Health:  Patient has been feeling nervous/anxious  PHQ-9 Depression Screening:    Frequency of the following problems over the past two weeks:      1  Little interest or pleasure in doing things: 0 - not at all      2  Feeling down, depressed, or hopeless: 0 - not at all  PHQ-2 Score: 0          Broken Bones/Falls: Fall Risk Assessment:    In the past year, patient has experienced: No history of falling in past year          Bladder/Bowel:  Patient has not leaked urine accidently in the last six months  Patient reports no loss of bowel control  Immunizations:  Patient has not had a flu vaccination within the last year  Patient has received a pneumonia shot  Patient has not received a shingles shot  Patient has not received tetanus/diphtheria shot  Home Safety:  Patient does not have trouble with stairs inside or outside of their home  Patient currently reports that there are no safety hazards present in home, working smoke alarms, working carbon monoxide detectors  Preventative Screenings:   Breast cancer screening performed, colon cancer screen completed, cholesterol screen completed, glaucoma eye exam completed,     Nutrition:  Current diet: Regular and Limited junk food with servings of the following:    Medications:  Patient is currently taking over-the-counter supplements  List of OTC medications includes: multivitamins  Patient is able to manage medications  Lifestyle Choices:  Patient reports no tobacco use  Patient has not smoked or used tobacco in the past   Patient reports alcohol use  Alcohol use per week: once a month  Patient drives a vehicle    Patient wears seat belt  Current level of exercise of physical activity described by patient as: walking  Activities of Daily Living:  Can get out of bed by his or her self, able to dress self, able to make own meals, able to do own shopping, able to bathe self, can do own laundry/housekeeping, can manage own money, pay bills and track expenses    Previous Hospitalizations:  No hospitalization or ED visit in past 12 months        Advanced Directives:  Patient has decided on a power of   Patient has spoken to designated power of   Patient has completed advanced directive  Preventative Screening/Counseling:      Cardiovascular:      General: Risks and Benefits Discussed and Screening Current      Counseling: Healthy Weight, Improve Cholesterol and Improve Exercise Tolerance          Diabetes:      General: Risks and Benefits Discussed and Screening Current          Colorectal Cancer:      General: Screening Not Indicated      Counseling: high fiber diet          Breast Cancer:      General: Screening Not Indicated          Cervical Cancer:      General: Screening Not Indicated          Osteoporosis:      General: Screening Current      Counseling: Calcium and Vitamin D Intake and Regular Weightbearing Exercise          AAA:      General: Screening Not Indicated          Glaucoma:      General: Screening Current          HIV:      General: Screening Not Indicated          Hepatitis C:      General: Patient Declines        Advanced Directives:   Patient has living will for healthcare, has durable POA for healthcare,     Immunizations:      Influenza: Patient Declines      Pneumococcal: Pneumococcal Due Today      Shingrix: Risks & Benefits Discussed      Hepatitis B (Low risk patients): Series Not Indicated      TD: Td Vaccine UTD      Other Preventative Counseling (Non-Medicare):   Fall Prevention, Increase physical activity and Weight reduction discussed

## 2019-02-25 NOTE — PATIENT INSTRUCTIONS

## 2019-02-25 NOTE — ASSESSMENT & PLAN NOTE
On no medications, follows with Dr Yesi Martines every 6 months  Advised on low salt diet and exercise/weight loss

## 2019-02-27 ENCOUNTER — TRANSCRIBE ORDERS (OUTPATIENT)
Dept: ADMINISTRATIVE | Facility: HOSPITAL | Age: 79
End: 2019-02-27

## 2019-02-27 ENCOUNTER — APPOINTMENT (OUTPATIENT)
Dept: LAB | Facility: HOSPITAL | Age: 79
End: 2019-02-27
Attending: INTERNAL MEDICINE
Payer: MEDICARE

## 2019-02-27 DIAGNOSIS — R79.89 ABNORMAL THYROID BLOOD TEST: ICD-10-CM

## 2019-02-27 LAB — TSH SERPL DL<=0.05 MIU/L-ACNC: 2.46 UIU/ML (ref 0.36–3.74)

## 2019-02-27 PROCEDURE — 36415 COLL VENOUS BLD VENIPUNCTURE: CPT

## 2019-02-27 PROCEDURE — 84443 ASSAY THYROID STIM HORMONE: CPT

## 2019-02-28 LAB — BACTERIA UR CULT: ABNORMAL

## 2019-05-06 ENCOUNTER — OFFICE VISIT (OUTPATIENT)
Dept: PULMONOLOGY | Facility: MEDICAL CENTER | Age: 79
End: 2019-05-06
Payer: MEDICARE

## 2019-05-06 VITALS
BODY MASS INDEX: 36.44 KG/M2 | HEIGHT: 62 IN | RESPIRATION RATE: 12 BRPM | TEMPERATURE: 97 F | OXYGEN SATURATION: 96 % | DIASTOLIC BLOOD PRESSURE: 72 MMHG | SYSTOLIC BLOOD PRESSURE: 142 MMHG | WEIGHT: 198 LBS | HEART RATE: 67 BPM

## 2019-05-06 DIAGNOSIS — G47.33 OBSTRUCTIVE SLEEP APNEA: Primary | ICD-10-CM

## 2019-05-06 DIAGNOSIS — J30.1 SEASONAL ALLERGIC RHINITIS DUE TO POLLEN: ICD-10-CM

## 2019-05-06 DIAGNOSIS — R09.82 POST-NASAL DRIP: ICD-10-CM

## 2019-05-06 DIAGNOSIS — G47.34 NOCTURNAL HYPOXEMIA: ICD-10-CM

## 2019-05-06 DIAGNOSIS — R06.02 SOB (SHORTNESS OF BREATH): ICD-10-CM

## 2019-05-06 PROCEDURE — 94010 BREATHING CAPACITY TEST: CPT | Performed by: INTERNAL MEDICINE

## 2019-05-06 PROCEDURE — 99213 OFFICE O/P EST LOW 20 MIN: CPT | Performed by: INTERNAL MEDICINE

## 2019-05-06 RX ORDER — FLUTICASONE PROPIONATE 50 MCG
2 SPRAY, SUSPENSION (ML) NASAL DAILY PRN
Qty: 1 BOTTLE | Refills: 6 | Status: SHIPPED | OUTPATIENT
Start: 2019-05-06 | End: 2021-09-09

## 2019-05-06 RX ORDER — FLUTICASONE PROPIONATE 50 MCG
2 SPRAY, SUSPENSION (ML) NASAL DAILY
Qty: 16 G | Refills: 5 | Status: SHIPPED | OUTPATIENT
Start: 2019-05-06 | End: 2019-05-30 | Stop reason: ALTCHOICE

## 2019-05-25 ENCOUNTER — OFFICE VISIT (OUTPATIENT)
Dept: FAMILY MEDICINE CLINIC | Facility: CLINIC | Age: 79
End: 2019-05-25
Payer: MEDICARE

## 2019-05-25 VITALS
HEART RATE: 66 BPM | WEIGHT: 194 LBS | DIASTOLIC BLOOD PRESSURE: 70 MMHG | BODY MASS INDEX: 35.7 KG/M2 | HEIGHT: 62 IN | TEMPERATURE: 98.2 F | SYSTOLIC BLOOD PRESSURE: 134 MMHG | RESPIRATION RATE: 18 BRPM

## 2019-05-25 DIAGNOSIS — J06.9 UPPER RESPIRATORY TRACT INFECTION, UNSPECIFIED TYPE: Primary | ICD-10-CM

## 2019-05-25 PROCEDURE — 99213 OFFICE O/P EST LOW 20 MIN: CPT | Performed by: INTERNAL MEDICINE

## 2019-05-25 RX ORDER — AZITHROMYCIN 250 MG/1
TABLET, FILM COATED ORAL
Qty: 6 TABLET | Refills: 0 | Status: SHIPPED | OUTPATIENT
Start: 2019-05-25 | End: 2019-05-30 | Stop reason: ALTCHOICE

## 2019-05-25 RX ORDER — DEXTROMETHORPHAN HYDROBROMIDE AND PROMETHAZINE HYDROCHLORIDE 15; 6.25 MG/5ML; MG/5ML
5 SYRUP ORAL 4 TIMES DAILY PRN
Qty: 118 ML | Refills: 0 | Status: SHIPPED | OUTPATIENT
Start: 2019-05-25 | End: 2020-07-06

## 2019-05-30 ENCOUNTER — OFFICE VISIT (OUTPATIENT)
Dept: FAMILY MEDICINE CLINIC | Facility: CLINIC | Age: 79
End: 2019-05-30
Payer: MEDICARE

## 2019-05-30 VITALS
RESPIRATION RATE: 16 BRPM | HEIGHT: 62 IN | BODY MASS INDEX: 36.07 KG/M2 | WEIGHT: 196 LBS | SYSTOLIC BLOOD PRESSURE: 122 MMHG | HEART RATE: 82 BPM | TEMPERATURE: 98.6 F | DIASTOLIC BLOOD PRESSURE: 68 MMHG

## 2019-05-30 DIAGNOSIS — J06.9 UPPER RESPIRATORY TRACT INFECTION, UNSPECIFIED TYPE: Primary | ICD-10-CM

## 2019-05-30 PROCEDURE — 99213 OFFICE O/P EST LOW 20 MIN: CPT | Performed by: INTERNAL MEDICINE

## 2019-05-30 RX ORDER — CEFUROXIME AXETIL 500 MG/1
500 TABLET ORAL EVERY 12 HOURS SCHEDULED
Qty: 20 TABLET | Refills: 0 | Status: SHIPPED | OUTPATIENT
Start: 2019-05-30 | End: 2019-06-09

## 2019-06-23 ENCOUNTER — OFFICE VISIT (OUTPATIENT)
Dept: URGENT CARE | Facility: CLINIC | Age: 79
End: 2019-06-23
Payer: MEDICARE

## 2019-06-23 VITALS
BODY MASS INDEX: 36.25 KG/M2 | SYSTOLIC BLOOD PRESSURE: 146 MMHG | WEIGHT: 197 LBS | OXYGEN SATURATION: 97 % | HEIGHT: 62 IN | TEMPERATURE: 99.1 F | HEART RATE: 80 BPM | RESPIRATION RATE: 19 BRPM | DIASTOLIC BLOOD PRESSURE: 72 MMHG

## 2019-06-23 DIAGNOSIS — H00.012 HORDEOLUM EXTERNUM RIGHT LOWER EYELID: Primary | ICD-10-CM

## 2019-06-23 PROCEDURE — 99213 OFFICE O/P EST LOW 20 MIN: CPT | Performed by: PHYSICIAN ASSISTANT

## 2019-06-23 RX ORDER — TOBRAMYCIN 3 MG/ML
1 SOLUTION/ DROPS OPHTHALMIC
Qty: 5 ML | Refills: 0 | Status: SHIPPED | OUTPATIENT
Start: 2019-06-23 | End: 2020-07-06

## 2019-06-23 RX ORDER — METHYLPREDNISOLONE 4 MG/1
TABLET ORAL
Qty: 21 EACH | Refills: 0 | Status: SHIPPED | OUTPATIENT
Start: 2019-06-23 | End: 2020-07-06

## 2019-07-22 ENCOUNTER — OFFICE VISIT (OUTPATIENT)
Dept: CARDIOLOGY CLINIC | Facility: CLINIC | Age: 79
End: 2019-07-22
Payer: MEDICARE

## 2019-07-22 ENCOUNTER — APPOINTMENT (OUTPATIENT)
Dept: LAB | Facility: CLINIC | Age: 79
End: 2019-07-22
Payer: MEDICARE

## 2019-07-22 VITALS
OXYGEN SATURATION: 97 % | WEIGHT: 198 LBS | BODY MASS INDEX: 36.44 KG/M2 | HEIGHT: 62 IN | SYSTOLIC BLOOD PRESSURE: 114 MMHG | DIASTOLIC BLOOD PRESSURE: 72 MMHG | HEART RATE: 66 BPM

## 2019-07-22 DIAGNOSIS — E66.9 CLASS 2 OBESITY: ICD-10-CM

## 2019-07-22 DIAGNOSIS — E03.9 ACQUIRED HYPOTHYROIDISM: ICD-10-CM

## 2019-07-22 DIAGNOSIS — I10 ESSENTIAL HYPERTENSION: ICD-10-CM

## 2019-07-22 DIAGNOSIS — I11.9 HYPERTENSIVE HEART DISEASE WITHOUT HEART FAILURE: ICD-10-CM

## 2019-07-22 DIAGNOSIS — G47.33 OBSTRUCTIVE SLEEP APNEA: ICD-10-CM

## 2019-07-22 DIAGNOSIS — I49.1 PAC (PREMATURE ATRIAL CONTRACTION): ICD-10-CM

## 2019-07-22 DIAGNOSIS — I49.3 ASYMPTOMATIC PVCS: ICD-10-CM

## 2019-07-22 DIAGNOSIS — I49.1 PREMATURE ATRIAL CONTRACTIONS: ICD-10-CM

## 2019-07-22 DIAGNOSIS — I10 ESSENTIAL HYPERTENSION: Primary | ICD-10-CM

## 2019-07-22 LAB
ALBUMIN SERPL BCP-MCNC: 3.4 G/DL (ref 3.5–5)
ALP SERPL-CCNC: 62 U/L (ref 46–116)
ALT SERPL W P-5'-P-CCNC: 23 U/L (ref 12–78)
ANION GAP SERPL CALCULATED.3IONS-SCNC: 1 MMOL/L (ref 4–13)
AST SERPL W P-5'-P-CCNC: 22 U/L (ref 5–45)
BILIRUB SERPL-MCNC: 0.79 MG/DL (ref 0.2–1)
BUN SERPL-MCNC: 18 MG/DL (ref 5–25)
CALCIUM SERPL-MCNC: 8.3 MG/DL (ref 8.3–10.1)
CHLORIDE SERPL-SCNC: 107 MMOL/L (ref 100–108)
CHOLEST SERPL-MCNC: 178 MG/DL (ref 50–200)
CO2 SERPL-SCNC: 30 MMOL/L (ref 21–32)
CREAT SERPL-MCNC: 0.64 MG/DL (ref 0.6–1.3)
GFR SERPL CREATININE-BSD FRML MDRD: 86 ML/MIN/1.73SQ M
GLUCOSE P FAST SERPL-MCNC: 90 MG/DL (ref 65–99)
HDLC SERPL-MCNC: 63 MG/DL (ref 40–60)
LDLC SERPL CALC-MCNC: 100 MG/DL (ref 0–100)
NONHDLC SERPL-MCNC: 115 MG/DL
POTASSIUM SERPL-SCNC: 4.4 MMOL/L (ref 3.5–5.3)
PROT SERPL-MCNC: 7 G/DL (ref 6.4–8.2)
SODIUM SERPL-SCNC: 138 MMOL/L (ref 136–145)
TRIGL SERPL-MCNC: 73 MG/DL

## 2019-07-22 PROCEDURE — 36415 COLL VENOUS BLD VENIPUNCTURE: CPT

## 2019-07-22 PROCEDURE — 99214 OFFICE O/P EST MOD 30 MIN: CPT | Performed by: INTERNAL MEDICINE

## 2019-07-22 PROCEDURE — 80061 LIPID PANEL: CPT

## 2019-07-22 PROCEDURE — 93000 ELECTROCARDIOGRAM COMPLETE: CPT | Performed by: INTERNAL MEDICINE

## 2019-07-22 PROCEDURE — 80053 COMPREHEN METABOLIC PANEL: CPT

## 2019-07-22 NOTE — PATIENT INSTRUCTIONS
1  Continue current medications  2  Weight loss was again encouraged  3  Use of leg elevation and compression hosiery for left ankle swelling  4  Cardiology follow-up approximately 6 months with EKG  5  Will call patient with results of lipid panel and CMP, which were drawn this morning

## 2019-07-22 NOTE — PROGRESS NOTES
Cardiology Progress Note    ASSESSMENT:    1   No active cardiac disease with history of intermittent palpitations, currently suppressed  2   Well controlled essential hypertension  3   Chronic dependent left ankle lower extremity edema, most likely reflecting venous insufficiency  4   Treated and controlled obstructive sleep apnea  5   History of pneumonia and tracheobronchitis with postinfectious cough beginning end of January, 2018, with recurrence of bronchitis in April, 2019  6   Subclinical mild hypothyroidism with TSH of 2 456 on 02/27/2019  7   Class 2 obesity, stable, as cause of intermittent exertional dyspnea and pedal edema  8   Low RBC indices, 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 thalassemia minor, with some family history of thalassemia  9   History of mildly abnormal nuclear stress test with likely artifactual apical perfusion defect on 12/27/2017  10   History of infrequent benign premature atrial contractions and rare asymptomatic PVCs on Holter monitor 11/06/2017  11   Benign echocardiogram consistent with hypertensive heart disease of a mild degree on 10/24/2017  12   Right visual light sensitivity following intra-ocular lens implant 2-1/2 years ago  Plan       Patient Instructions     1  Continue current medications  2  Weight loss was again encouraged  3  Use of leg elevation and compression hosiery for left ankle swelling  4  Cardiology follow-up approximately 6 months with EKG  5  Will call patient with results of lipid panel and CMP, which were drawn this morning  HPI    This 67 y o  female  denies new cardiopulmonary and medical symptoms  The patient developed bright red bleeding per rectum with clots, melena from 05/10/2019 and had colonoscopy on 05/15/2019 by Dr Ericka Lyons, which showed diffuse diverticulosis and splenic colitis, source unknown  Biopsies were negative  This seemed to resolve shortly after colonoscopy    Patient also had a bout of bronchitis extending from April to May of 2019  The patient has had a stye involving her right eyelid,  only incompletely healed with treatment and plans to see an eye doctor  She complains of dependent left ankle edema, getting worse as the day goes on  She denies any other cardiopulmonary symptoms  Review of Systems    All other systems negative, except as noted in history of present illness    Historical Information   Past Medical History:   Diagnosis Date    Anxiety     Bleeding from varicose vein     Environmental allergies     Fracture of radius     Obesity     Scoliosis     mild     Vaginitis     Vulvovaginitis      Past Surgical History:   Procedure Laterality Date    EYE SURGERY      cataract    TUBAL LIGATION       Social History     Substance and Sexual Activity   Alcohol Use Yes    Frequency: Monthly or less    Drinks per session: 1 or 2    Binge frequency: Never    Comment: social      Social History     Substance and Sexual Activity   Drug Use No     Social History     Tobacco Use   Smoking Status Never Smoker   Smokeless Tobacco Never Used       Family History:  Family History   Problem Relation Age of Onset    Colon cancer Maternal Aunt     Stroke Mother          Meds/Allergies     Prior to Admission medications    Medication Sig Start Date End Date Taking?  Authorizing Provider   aspirin 81 mg chewable tablet Chew 1 tablet daily 1/15/18  Yes Historical Provider, MD   B COMPLEX VITAMINS PO Take by mouth   Yes Historical Provider, MD   b complex vitamins tablet Take 1 tablet by mouth daily   Yes Historical Provider, MD   fluticasone (FLONASE) 50 mcg/act nasal spray 2 sprays into each nostril daily as needed for rhinitis 5/6/19  Yes Juan M Blazing, DO   Naproxen Sodium (ALEVE) 220 MG CAPS Take 1 capsule by mouth daily     Yes Historical Provider, MD   nystatin-triamcinolone (MYCOLOG-II) cream Apply topically as needed     Yes Historical Provider, MD   sodium chloride (AYR SALINE NASAL) nasal gel 1 application into each nostril every hour as needed   Yes Historical Provider, MD   ipratropium (ATROVENT HFA) 17 mcg/act inhaler Inhale 2 puffs every 6 (six) hours  Patient not taking: Reported on 7/22/2019 5/30/19   Eloise Gamble MD   methylPREDNISolone 4 MG tablet therapy pack Use as directed on package  Patient not taking: Reported on 7/22/2019 6/23/19   Fabrizio Pena PA-C   promethazine-dextromethorphan (PHENERGAN-DM) 6 25-15 mg/5 mL oral syrup Take 5 mL by mouth 4 (four) times a day as needed for cough  Patient not taking: Reported on 5/30/2019 5/25/19   Eloise Gamble MD   tobramycin (TOBREX) 0 3 % SOLN Administer 1 drop to the right eye every 4 (four) hours while awake  Patient not taking: Reported on 7/22/2019 6/23/19   Fabrizio Pena PA-C       Allergies   Allergen Reactions    Penicillins     Sulfa Antibiotics          Vitals:    07/22/19 0824   BP: 114/72   BP Location: Right arm   Patient Position: Sitting   Cuff Size: Large   Pulse: 66   SpO2: 97%   Weight: 89 8 kg (198 lb)   Height: 5' 1 5" (1 562 m)       Body mass index is 36 81 kg/m²  2 pound weight loss in 6 months    Physical Exam:    General Appearance:  Alert, cooperative, no distress, appears stated age and is moderately obese     Head:  Normocephalic, without obvious abnormality, atraumatic   Eyes:  PERRL, conjunctiva/corneas clear, EOM's intact,   both eyes   Ears:  Normal TM's and external ear canals, both ears   Nose: Nares normal, septum midline, mucosa normal, no drainage or sinus tenderness   Throat: Lips, mucosa, and tongue normal; teeth and gums normal   Neck: Supple, symmetrical, trachea midline, no adenopathy, thyroid: not enlarged, symmetric, no tenderness/mass/nodules, no carotid bruit or JVD   Back:   Symmetric, no curvature, ROM normal, no CVA tenderness   Lungs:   Clear to auscultation bilaterally, respirations unlabored   Chest Wall:  No tenderness or deformity   Heart:  Regular rate and rhythm, S1, S2 normal, no murmur, rub or gallop   Abdomen:   Soft, non-tender, bowel sounds active all four quadrants,  no masses, no organomegaly in moderately obese  Extremities: Extremities normal, atraumatic, no cyanosis or edema with superficial spider veins and mild varicose veins in both ankle areas  Pulses: 2+ and symmetric   Skin: Skin showed normal color, texture, turgor and no rashes or lesions   Lymph nodes: Cervical, supraclavicular, and axillary nodes normal   Neurologic: Normal         Cardiographics    ECG  07/22/2019:    Normal ECG, unchanged from 01/21/2019      Imaging    Chest X-Ray :  No Chest XR results available for this patient            Lab Review       Lab Results   Component Value Date    SODIUM 142 11/29/2018    K 3 8 11/29/2018     11/29/2018    CO2 28 11/29/2018    BUN 25 11/29/2018    CREATININE 1 13 11/29/2018    GLUF 97 06/08/2018    CALCIUM 8 9 11/29/2018    AST 17 11/29/2018    ALT 23 11/29/2018    ALKPHOS 67 11/29/2018    EGFR 47 11/29/2018       Lab Results   Component Value Date    CHOLESTEROL 178 06/08/2018     Lab Results   Component Value Date    HDL 61 (H) 06/08/2018     No results found for: LDLCHOLEST  Lab Results   Component Value Date    LDLCALC 104 (H) 06/08/2018     No components found for: [facility]  Lab Results   Component Value Date    TRIG 67 06/08/2018         Lab Results   Component Value Date    CALCIUM 8 9 11/29/2018    K 3 8 11/29/2018    CO2 28 11/29/2018     11/29/2018    BUN 25 11/29/2018    CREATININE 1 13 11/29/2018           Prince Avalos MD

## 2019-07-23 ENCOUNTER — TELEPHONE (OUTPATIENT)
Dept: CARDIOLOGY CLINIC | Facility: CLINIC | Age: 79
End: 2019-07-23

## 2019-07-23 NOTE — TELEPHONE ENCOUNTER
----- Message from Ann Eller MD sent at 7/22/2019  5:05 PM EDT -----  Notify patient that CMP chemistry panel was normal, which includes electrolytes, kidney function, blood sugar, and liver functions  Her lipid panel was normal except for a borderline elevation of the LDL or bad cholesterol to 100, which is for points lower than the results from 1 year ago  Nothing really to worry about on either of these tests

## 2019-10-23 ENCOUNTER — TRANSCRIBE ORDERS (OUTPATIENT)
Dept: ADMINISTRATIVE | Facility: HOSPITAL | Age: 79
End: 2019-10-23

## 2019-10-23 DIAGNOSIS — N64.4 BREAST PAIN: Primary | ICD-10-CM

## 2019-11-22 ENCOUNTER — HOSPITAL ENCOUNTER (OUTPATIENT)
Dept: RADIOLOGY | Facility: HOSPITAL | Age: 79
Discharge: HOME | End: 2019-11-22
Attending: OBSTETRICS & GYNECOLOGY
Payer: MEDICARE

## 2019-11-22 VITALS — HEIGHT: 62 IN | BODY MASS INDEX: 36.44 KG/M2 | WEIGHT: 198 LBS

## 2019-11-22 DIAGNOSIS — N64.4 BREAST PAIN: ICD-10-CM

## 2019-11-22 PROCEDURE — G0279 TOMOSYNTHESIS, MAMMO: HCPCS

## 2019-11-22 PROCEDURE — 77066 DX MAMMO INCL CAD BI: CPT

## 2019-11-22 PROCEDURE — 76642 ULTRASOUND BREAST LIMITED: CPT

## 2020-02-11 ENCOUNTER — OFFICE VISIT (OUTPATIENT)
Dept: PULMONOLOGY | Facility: MEDICAL CENTER | Age: 80
End: 2020-02-11
Payer: MEDICARE

## 2020-02-11 VITALS
DIASTOLIC BLOOD PRESSURE: 70 MMHG | SYSTOLIC BLOOD PRESSURE: 138 MMHG | BODY MASS INDEX: 38.71 KG/M2 | HEIGHT: 61 IN | HEART RATE: 72 BPM | TEMPERATURE: 97.6 F | WEIGHT: 205 LBS | RESPIRATION RATE: 12 BRPM | OXYGEN SATURATION: 96 %

## 2020-02-11 DIAGNOSIS — G47.34 NOCTURNAL HYPOXEMIA: Primary | ICD-10-CM

## 2020-02-11 DIAGNOSIS — M94.0 COSTOCHONDRITIS: ICD-10-CM

## 2020-02-11 DIAGNOSIS — G47.33 OBSTRUCTIVE SLEEP APNEA: ICD-10-CM

## 2020-02-11 PROCEDURE — 3075F SYST BP GE 130 - 139MM HG: CPT | Performed by: INTERNAL MEDICINE

## 2020-02-11 PROCEDURE — 1036F TOBACCO NON-USER: CPT | Performed by: INTERNAL MEDICINE

## 2020-02-11 PROCEDURE — 1160F RVW MEDS BY RX/DR IN RCRD: CPT | Performed by: INTERNAL MEDICINE

## 2020-02-11 PROCEDURE — 3078F DIAST BP <80 MM HG: CPT | Performed by: INTERNAL MEDICINE

## 2020-02-11 PROCEDURE — 99214 OFFICE O/P EST MOD 30 MIN: CPT | Performed by: INTERNAL MEDICINE

## 2020-02-11 PROCEDURE — 4040F PNEUMOC VAC/ADMIN/RCVD: CPT | Performed by: INTERNAL MEDICINE

## 2020-02-11 NOTE — PATIENT INSTRUCTIONS
I will order nocturnal pulse oximetry recording    Call our office if Anastasia Rehman does not contact you and within 2 weeks    If left anterior chest wall pain does not improve then I will order CT of chest at end of [facility] [address] after therapy is completed    Use Atrovent inhaler 2 puffs four times a day as needed

## 2020-02-11 NOTE — PROGRESS NOTES
Assessment/Plan        Problem List Items Addressed This Visit        Respiratory    Obstructive sleep apnea     She has history of obstructive sleep apnea but has not been using her CPAP now for several months  I did advise her to try to lose weight as this would help  At present time she is not interested and restart her CPAP  Nocturnal hypoxemia - Primary     ROCK has history of moderate GEORGINA but is not using her CPAP and has not using for several months  She states she does sleep sitting up with 2-3 pillows  She has no shortness of breath at night  Will order nocturnal pulse oximetry to see years any significant oxygen desaturation at bedtime  She does not want to restart using CPAP at this time         Relevant Orders    Pulse oximetry overnight       Musculoskeletal and Integument    Costochondritis     She has been getting some intermittent discomfort left anterior chest along the left 4th parasternal region  His been on for several weeks and appears to be costochondritis  This is often relieved with nonsteroidal anti-inflammatory such as Aleve or Motrin  Also the pain sometimes improved with certain movements of her body  For chest pain does not improve over the next 1 month a total contact our office and I will order CT of the chest                  CC:  Left chest wall discomfort      KATLYN scanlon for a follow-up visit  She has history of moderate obstructive sleep apnea but has not used her CPAP now for several months  She states she use is 2-3 pillows in position herself partially upright  She states that she does not wake up with shortness of breath and feels well rested in the morning  She does have moderate GEORGINA but has not used her CPAP now in several months  I did order nocturnal pulse oximetry recording on room air last visit but this did not get done  She has been having pain in her left anterior chest along the 4th left parasternal region    This been going on for several weeks  She did see surgeon Dr Alba Briggs regarding this and he thought she had costochondritis  He recommended Alleve which does help her  Sometimes this pain radiates into her left lateral chest dyspnea her axilla  Sometime movement actually helps the discomfort  She is not have any new cough  She does occasional wheeze at night and takes Atrovent inhaler which helps  Spirometry done in May of 2019 was normal     She does have history of seasonal allergic rhinitis and does use Flonase nasal spray when this bothers her      Past Medical History:   Diagnosis Date    Anxiety     Bleeding from varicose vein     Environmental allergies     Fracture of radius     Obesity     Scoliosis     mild     Vaginitis     Vulvovaginitis        Past Surgical History:   Procedure Laterality Date    EYE SURGERY      cataract    TUBAL LIGATION           Current Outpatient Medications:     aspirin 81 mg chewable tablet, Chew 1 tablet daily, Disp: , Rfl:     B COMPLEX VITAMINS PO, Take by mouth, Disp: , Rfl:     fluticasone (FLONASE) 50 mcg/act nasal spray, 2 sprays into each nostril daily as needed for rhinitis, Disp: 1 Bottle, Rfl: 6    ipratropium (ATROVENT HFA) 17 mcg/act inhaler, Inhale 2 puffs every 6 (six) hours, Disp: 1 Inhaler, Rfl: 2    Naproxen Sodium (ALEVE) 220 MG CAPS, Take 1 capsule by mouth daily  , Disp: , Rfl:     nystatin-triamcinolone (MYCOLOG-II) cream, Apply topically as needed  , Disp: , Rfl:     sodium chloride (AYR SALINE NASAL) nasal gel, 1 application into each nostril every hour as needed, Disp: , Rfl:     b complex vitamins tablet, Take 1 tablet by mouth daily, Disp: , Rfl:     methylPREDNISolone 4 MG tablet therapy pack, Use as directed on package (Patient not taking: Reported on 7/22/2019), Disp: 21 each, Rfl: 0    promethazine-dextromethorphan (PHENERGAN-DM) 6 25-15 mg/5 mL oral syrup, Take 5 mL by mouth 4 (four) times a day as needed for cough (Patient not taking: Reported on 5/30/2019), Disp: 118 mL, Rfl: 0    tobramycin (TOBREX) 0 3 % SOLN, Administer 1 drop to the right eye every 4 (four) hours while awake (Patient not taking: Reported on 7/22/2019), Disp: 5 mL, Rfl: 0    Allergies   Allergen Reactions    Penicillins     Sulfa Antibiotics        Social History     Tobacco Use    Smoking status: Never Smoker    Smokeless tobacco: Never Used   Substance Use Topics    Alcohol use: Yes     Frequency: Monthly or less     Drinks per session: 1 or 2     Binge frequency: Never     Comment: social          Family History   Problem Relation Age of Onset    Colon cancer Maternal Aunt     Stroke Mother        Review of Systems   Constitutional: Negative for activity change, appetite change and fever  HENT: Negative for congestion and rhinorrhea  Eyes: Negative for redness  Respiratory: Negative for chest tightness and shortness of breath  Cardiovascular:        Has some left anterior chest wall pain which is relieved with certain movements of her body also improved with Aleve   Gastrointestinal: Negative for abdominal distention, abdominal pain and nausea  Endocrine: Negative for polydipsia and polyphagia  Genitourinary: Negative for flank pain and hematuria  Musculoskeletal: Negative for joint swelling and myalgias  Neurological: Negative for light-headedness  Psychiatric/Behavioral: Negative for confusion and decreased concentration  Vitals:    02/11/20 0831   BP: 138/70   Pulse: 72   Resp: 12   Temp: 97 6 °F (36 4 °C)   SpO2: 96%           Physical Exam   Constitutional: She is oriented to person, place, and time  She appears well-developed and well-nourished  No distress  HENT:   Head: Normocephalic  Nose: Nose normal    Mouth/Throat: Oropharynx is clear and moist  No oropharyngeal exudate  Eyes: Pupils are equal, round, and reactive to light  Conjunctivae are normal    Neck: Neck supple  No JVD present   No tracheal deviation present  Cardiovascular: Normal rate, regular rhythm and normal heart sounds  Lung sounds are clear  No wheezes, crackles or rhonchi   Pulmonary/Chest: Effort normal    Mild tenderness over left anterior costochondral junction 4th rib region   Abdominal: Soft  She exhibits no distension  There is no tenderness  Musculoskeletal:   No edema cyanosis or clubbing   Lymphadenopathy:     She has no cervical adenopathy  Neurological: She is alert and oriented to person, place, and time  Skin: Skin is warm and dry  Psychiatric: She has a normal mood and affect

## 2020-03-01 PROBLEM — G47.34 NOCTURNAL HYPOXEMIA: Status: ACTIVE | Noted: 2020-03-01

## 2020-03-02 NOTE — ASSESSMENT & PLAN NOTE
She has history of obstructive sleep apnea but has not been using her CPAP now for several months  I did advise her to try to lose weight as this would help  At present time she is not interested and restart her CPAP

## 2020-03-02 NOTE — ASSESSMENT & PLAN NOTE
Terrie Soulier has history of moderate GEORGINA but is not using her CPAP and has not using for several months  She states she does sleep sitting up with 2-3 pillows  She has no shortness of breath at night  Will order nocturnal pulse oximetry to see years any significant oxygen desaturation at bedtime    She does not want to restart using CPAP at this time

## 2020-03-02 NOTE — ASSESSMENT & PLAN NOTE
She has been getting some intermittent discomfort left anterior chest along the left 4th parasternal region  His been on for several weeks and appears to be costochondritis  This is often relieved with nonsteroidal anti-inflammatory such as Aleve or Motrin  Also the pain sometimes improved with certain movements of her body    For chest pain does not improve over the next 1 month a total contact our office and I will order CT of the chest

## 2020-03-25 NOTE — PROGRESS NOTES
She has dx'd with GEORGINA  She has not used in some time  Nocturnal pulse oximetry was reviewed  Oxygen below 88% is 108 minutes  I wlll have Dr Megan Wiggins call patient tomorrow

## 2020-03-26 ENCOUNTER — TELEPHONE (OUTPATIENT)
Dept: PULMONOLOGY | Facility: MEDICAL CENTER | Age: 80
End: 2020-03-26

## 2020-03-26 DIAGNOSIS — R06.00 DYSPNEA, UNSPECIFIED TYPE: ICD-10-CM

## 2020-03-26 DIAGNOSIS — R07.9 CHEST PAIN, UNSPECIFIED TYPE: ICD-10-CM

## 2020-03-26 DIAGNOSIS — G47.34 NOCTURNAL HYPOXEMIA: Primary | ICD-10-CM

## 2020-03-26 NOTE — TELEPHONE ENCOUNTER
I spoke to ROCK regarding results of nocturnal pulse oximetry recording done at her home on room air on the evening of March 5th  Her average O2 saturation was 90 2% and akren 61%  She spent 109 minutes less than or equal to 88% O2 saturation  She is overweight does have history of GEORGINA so this oxygen desaturation may be from that  She still having some left anterior chest discomfort and shortness of breath at times  This is prior related to left costal chondritis but will order CT scan of the chest to evaluate for PE or any chest wall abdomen is not visible on chest x-ray  She will have CBC and D-dimer and CMP prior to having CT done    Will place order for CTA of chest    She did have physical therapy for left anterior chest wall pain which may be due to costochondritis

## 2020-03-27 ENCOUNTER — APPOINTMENT (OUTPATIENT)
Dept: LAB | Facility: CLINIC | Age: 80
End: 2020-03-27
Payer: MEDICARE

## 2020-03-27 DIAGNOSIS — R06.00 DYSPNEA, UNSPECIFIED TYPE: ICD-10-CM

## 2020-03-27 LAB
ALBUMIN SERPL BCP-MCNC: 3.6 G/DL (ref 3.5–5)
ALP SERPL-CCNC: 64 U/L (ref 46–116)
ALT SERPL W P-5'-P-CCNC: 23 U/L (ref 12–78)
ANION GAP SERPL CALCULATED.3IONS-SCNC: 1 MMOL/L (ref 4–13)
AST SERPL W P-5'-P-CCNC: 22 U/L (ref 5–45)
BASOPHILS # BLD AUTO: 0.08 THOUSANDS/ΜL (ref 0–0.1)
BASOPHILS NFR BLD AUTO: 1 % (ref 0–1)
BILIRUB SERPL-MCNC: 0.82 MG/DL (ref 0.2–1)
BUN SERPL-MCNC: 19 MG/DL (ref 5–25)
CALCIUM SERPL-MCNC: 8.7 MG/DL (ref 8.3–10.1)
CHLORIDE SERPL-SCNC: 107 MMOL/L (ref 100–108)
CO2 SERPL-SCNC: 30 MMOL/L (ref 21–32)
CREAT SERPL-MCNC: 0.69 MG/DL (ref 0.6–1.3)
D DIMER PPP FEU-MCNC: 0.76 UG/ML FEU
EOSINOPHIL # BLD AUTO: 0.29 THOUSAND/ΜL (ref 0–0.61)
EOSINOPHIL NFR BLD AUTO: 5 % (ref 0–6)
ERYTHROCYTE [DISTWIDTH] IN BLOOD BY AUTOMATED COUNT: 17.1 % (ref 11.6–15.1)
GFR SERPL CREATININE-BSD FRML MDRD: 84 ML/MIN/1.73SQ M
GLUCOSE P FAST SERPL-MCNC: 87 MG/DL (ref 65–99)
HCT VFR BLD AUTO: 42.3 % (ref 34.8–46.1)
HGB BLD-MCNC: 12.8 G/DL (ref 11.5–15.4)
IMM GRANULOCYTES # BLD AUTO: 0.01 THOUSAND/UL (ref 0–0.2)
IMM GRANULOCYTES NFR BLD AUTO: 0 % (ref 0–2)
LYMPHOCYTES # BLD AUTO: 2.37 THOUSANDS/ΜL (ref 0.6–4.47)
LYMPHOCYTES NFR BLD AUTO: 42 % (ref 14–44)
MCH RBC QN AUTO: 20.8 PG (ref 26.8–34.3)
MCHC RBC AUTO-ENTMCNC: 30.3 G/DL (ref 31.4–37.4)
MCV RBC AUTO: 69 FL (ref 82–98)
MONOCYTES # BLD AUTO: 0.39 THOUSAND/ΜL (ref 0.17–1.22)
MONOCYTES NFR BLD AUTO: 7 % (ref 4–12)
NEUTROPHILS # BLD AUTO: 2.54 THOUSANDS/ΜL (ref 1.85–7.62)
NEUTS SEG NFR BLD AUTO: 45 % (ref 43–75)
NRBC BLD AUTO-RTO: 0 /100 WBCS
PLATELET # BLD AUTO: 239 THOUSANDS/UL (ref 149–390)
PMV BLD AUTO: 10.5 FL (ref 8.9–12.7)
POTASSIUM SERPL-SCNC: 4.4 MMOL/L (ref 3.5–5.3)
PROT SERPL-MCNC: 7.4 G/DL (ref 6.4–8.2)
RBC # BLD AUTO: 6.15 MILLION/UL (ref 3.81–5.12)
SODIUM SERPL-SCNC: 138 MMOL/L (ref 136–145)
WBC # BLD AUTO: 5.68 THOUSAND/UL (ref 4.31–10.16)

## 2020-03-27 PROCEDURE — 85025 COMPLETE CBC W/AUTO DIFF WBC: CPT

## 2020-03-27 PROCEDURE — 85379 FIBRIN DEGRADATION QUANT: CPT

## 2020-03-27 PROCEDURE — 36415 COLL VENOUS BLD VENIPUNCTURE: CPT

## 2020-03-27 PROCEDURE — 80053 COMPREHEN METABOLIC PANEL: CPT

## 2020-05-04 ENCOUNTER — TELEPHONE (OUTPATIENT)
Dept: FAMILY MEDICINE CLINIC | Facility: CLINIC | Age: 80
End: 2020-05-04

## 2020-07-01 NOTE — PROGRESS NOTES
Assessment/Plan:    There are no diagnoses linked to this encounter  BMI Counseling: There is no height or weight on file to calculate BMI  The BMI {VB BMI Counselin}  There are no Patient Instructions on file for this visit  No follow-ups on file  Subjective:      Patient ID: Lisandra Heck is a 79 y o  female  No chief complaint on file  HPI    The following portions of the patient's history were reviewed and updated as appropriate: allergies, current medications, past family history, past medical history, past social history, past surgical history and problem list     Review of Systems   Constitutional: Negative  Respiratory: Negative  Cardiovascular: Negative  Current Outpatient Medications   Medication Sig Dispense Refill    aspirin 81 mg chewable tablet Chew 1 tablet daily      B COMPLEX VITAMINS PO Take by mouth      b complex vitamins tablet Take 1 tablet by mouth daily      fluticasone (FLONASE) 50 mcg/act nasal spray 2 sprays into each nostril daily as needed for rhinitis 1 Bottle 6    ipratropium (ATROVENT HFA) 17 mcg/act inhaler Inhale 2 puffs every 6 (six) hours 1 Inhaler 2    methylPREDNISolone 4 MG tablet therapy pack Use as directed on package (Patient not taking: Reported on 2019) 21 each 0    Naproxen Sodium (ALEVE) 220 MG CAPS Take 1 capsule by mouth daily        nystatin-triamcinolone (MYCOLOG-II) cream Apply topically as needed        promethazine-dextromethorphan (PHENERGAN-DM) 6 25-15 mg/5 mL oral syrup Take 5 mL by mouth 4 (four) times a day as needed for cough (Patient not taking: Reported on 2019) 118 mL 0    sodium chloride (AYR SALINE NASAL) nasal gel 1 application into each nostril every hour as needed      tobramycin (TOBREX) 0 3 % SOLN Administer 1 drop to the right eye every 4 (four) hours while awake (Patient not taking: Reported on 2019) 5 mL 0     No current facility-administered medications for this visit  Objective: There were no vitals taken for this visit         Physical Exam           Townsend Schlatter, MD

## 2020-07-06 ENCOUNTER — OFFICE VISIT (OUTPATIENT)
Dept: FAMILY MEDICINE CLINIC | Facility: CLINIC | Age: 80
End: 2020-07-06
Payer: MEDICARE

## 2020-07-06 VITALS
TEMPERATURE: 97.2 F | HEART RATE: 76 BPM | RESPIRATION RATE: 18 BRPM | DIASTOLIC BLOOD PRESSURE: 64 MMHG | SYSTOLIC BLOOD PRESSURE: 118 MMHG | BODY MASS INDEX: 38.25 KG/M2 | HEIGHT: 61 IN | WEIGHT: 202.6 LBS

## 2020-07-06 DIAGNOSIS — N95.9 MENOPAUSAL DISORDER: ICD-10-CM

## 2020-07-06 DIAGNOSIS — Z00.00 MEDICARE ANNUAL WELLNESS VISIT, SUBSEQUENT: Primary | ICD-10-CM

## 2020-07-06 PROCEDURE — 4040F PNEUMOC VAC/ADMIN/RCVD: CPT | Performed by: INTERNAL MEDICINE

## 2020-07-06 PROCEDURE — 1123F ACP DISCUSS/DSCN MKR DOCD: CPT | Performed by: INTERNAL MEDICINE

## 2020-07-06 PROCEDURE — 3078F DIAST BP <80 MM HG: CPT | Performed by: INTERNAL MEDICINE

## 2020-07-06 PROCEDURE — 1036F TOBACCO NON-USER: CPT | Performed by: INTERNAL MEDICINE

## 2020-07-06 PROCEDURE — 3074F SYST BP LT 130 MM HG: CPT | Performed by: INTERNAL MEDICINE

## 2020-07-06 PROCEDURE — 3008F BODY MASS INDEX DOCD: CPT | Performed by: INTERNAL MEDICINE

## 2020-07-06 PROCEDURE — 1170F FXNL STATUS ASSESSED: CPT | Performed by: INTERNAL MEDICINE

## 2020-07-06 PROCEDURE — 1125F AMNT PAIN NOTED PAIN PRSNT: CPT | Performed by: INTERNAL MEDICINE

## 2020-07-06 PROCEDURE — 1160F RVW MEDS BY RX/DR IN RCRD: CPT | Performed by: INTERNAL MEDICINE

## 2020-07-06 PROCEDURE — G0439 PPPS, SUBSEQ VISIT: HCPCS | Performed by: INTERNAL MEDICINE

## 2020-07-06 NOTE — PATIENT INSTRUCTIONS
Medicare Preventive Visit Patient Instructions  Thank you for completing your Welcome to Medicare Visit or Medicare Annual Wellness Visit today  Your next wellness visit will be due in one year (7/6/2021)  The screening/preventive services that you may require over the next 5-10 years are detailed below  Some tests may not apply to you based off risk factors and/or age  Screening tests ordered at today's visit but not completed yet may show as past due  Also, please note that scanned in results may not display below  Preventive Screenings:  Service Recommendations Previous Testing/Comments   Colorectal Cancer Screening  * Colonoscopy    * Fecal Occult Blood Test (FOBT)/Fecal Immunochemical Test (FIT)  * Fecal DNA/Cologuard Test  * Flexible Sigmoidoscopy Age: 54-65 years old   Colonoscopy: every 10 years (may be performed more frequently if at higher risk)  OR  FOBT/FIT: every 1 year  OR  Cologuard: every 3 years  OR  Sigmoidoscopy: every 5 years  Screening may be recommended earlier than age 48 if at higher risk for colorectal cancer  Also, an individualized decision between you and your healthcare provider will decide whether screening between the ages of 74-80 would be appropriate  Colonoscopy: 05/15/2019  FOBT/FIT: Not on file  Cologuard: Not on file  Sigmoidoscopy: Not on file    Screening Current     Breast Cancer Screening Age: 36 years old  Frequency: every 1-2 years  Not required if history of left and right mastectomy Mammogram: 11/22/2019    Screening Current   Cervical Cancer Screening Between the ages of 21-29, pap smear recommended once every 3 years  Between the ages of 33-67, can perform pap smear with HPV co-testing every 5 years     Recommendations may differ for women with a history of total hysterectomy, cervical cancer, or abnormal pap smears in past  Pap Smear: Not on file    Screening Not Indicated   Hepatitis C Screening Once for adults born between 1945 and 1965  More frequently in patients at high risk for Hepatitis C Hep C Antibody: Not on file       Diabetes Screening 1-2 times per year if you're at risk for diabetes or have pre-diabetes Fasting glucose: 87 mg/dL   A1C: No results in last 5 years    Screening Current   Cholesterol Screening Once every 5 years if you don't have a lipid disorder  May order more often based on risk factors  Lipid panel: 07/22/2019    Screening Current     Other Preventive Screenings Covered by Medicare:  1  Abdominal Aortic Aneurysm (AAA) Screening: covered once if your at risk  You're considered to be at risk if you have a family history of AAA  2  Lung Cancer Screening: covers low dose CT scan once per year if you meet all of the following conditions: (1) Age 50-69; (2) No signs or symptoms of lung cancer; (3) Current smoker or have quit smoking within the last 15 years; (4) You have a tobacco smoking history of at least 30 pack years (packs per day multiplied by number of years you smoked); (5) You get a written order from a healthcare provider  3  Glaucoma Screening: covered annually if you're considered high risk: (1) You have diabetes OR (2) Family history of glaucoma OR (3)  aged 48 and older OR (3)  American aged 72 and older  3  Osteoporosis Screening: covered every 2 years if you meet one of the following conditions: (1) You're estrogen deficient and at risk for osteoporosis based off medical history and other findings; (2) Have a vertebral abnormality; (3) On glucocorticoid therapy for more than 3 months; (4) Have primary hyperparathyroidism; (5) On osteoporosis medications and need to assess response to drug therapy  · Last bone density test (DXA Scan): Not on file  5  HIV Screening: covered annually if you're between the age of 12-76  Also covered annually if you are younger than 13 and older than 72 with risk factors for HIV infection   For pregnant patients, it is covered up to 3 times per pregnancy  Immunizations:  Immunization Recommendations   Influenza Vaccine Annual influenza vaccination during flu season is recommended for all persons aged >= 6 months who do not have contraindications   Pneumococcal Vaccine (Prevnar and Pneumovax)  * Prevnar = PCV13  * Pneumovax = PPSV23   Adults 25-60 years old: 1-3 doses may be recommended based on certain risk factors  Adults 72 years old: Prevnar (PCV13) vaccine recommended followed by Pneumovax (PPSV23) vaccine  If already received PPSV23 since turning 65, then PCV13 recommended at least one year after PPSV23 dose  Hepatitis B Vaccine 3 dose series if at intermediate or high risk (ex: diabetes, end stage renal disease, liver disease)   Tetanus (Td) Vaccine - COST NOT COVERED BY MEDICARE PART B Following completion of primary series, a booster dose should be given every 10 years to maintain immunity against tetanus  Td may also be given as tetanus wound prophylaxis  Tdap Vaccine - COST NOT COVERED BY MEDICARE PART B Recommended at least once for all adults  For pregnant patients, recommended with each pregnancy  Shingles Vaccine (Shingrix) - COST NOT COVERED BY MEDICARE PART B  2 shot series recommended in those aged 48 and above     Health Maintenance Due:      Topic Date Due    CRC Screening: Colonoscopy  05/15/2024     Immunizations Due:      Topic Date Due    DTaP,Tdap,and Td Vaccines (1 - Tdap) 04/30/1951    Influenza Vaccine  07/01/2020     Advance Directives   What are advance directives? Advance directives are legal documents that state your wishes and plans for medical care  These plans are made ahead of time in case you lose your ability to make decisions for yourself  Advance directives can apply to any medical decision, such as the treatments you want, and if you want to donate organs  What are the types of advance directives? There are many types of advance directives, and each state has rules about how to use them   You may choose a combination of any of the following:  · Living will: This is a written record of the treatment you want  You can also choose which treatments you do not want, which to limit, and which to stop at a certain time  This includes surgery, medicine, IV fluid, and tube feedings  · Durable power of  for healthcare [facility]): This is a written record that states who you want to make healthcare choices for you when you are unable to make them for yourself  This person, called a proxy, is usually a family member or a friend  You may choose more than 1 proxy  · Do not resuscitate (DNR) order:  A DNR order is used in case your heart stops beating or you stop breathing  It is a request not to have certain forms of treatment, such as CPR  A DNR order may be included in other types of advance directives  · Medical directive: This covers the care that you want if you are in a coma, near death, or unable to make decisions for yourself  You can list the treatments you want for each condition  Treatment may include pain medicine, surgery, blood transfusions, dialysis, IV or tube feedings, and a ventilator (breathing machine)  · Values history: This document has questions about your views, beliefs, and how you feel and think about life  This information can help others choose the care that you would choose  Why are advance directives important? An advance directive helps you control your care  Although spoken wishes may be used, it is better to have your wishes written down  Spoken wishes can be misunderstood, or not followed  Treatments may be given even if you do not want them  An advance directive may make it easier for your family to make difficult choices about your care  Urinary Incontinence   Urinary incontinence (UI)  is when you lose control of your bladder  UI develops because your bladder cannot store or empty urine properly   The 3 most common types of UI are stress incontinence, urge incontinence, or both  Medicines:   · May be given to help strengthen your bladder control  Report any side effects of medication to your healthcare provider  Do pelvic muscle exercises often:  Your pelvic muscles help you stop urinating  Squeeze these muscles tight for 5 seconds, then relax for 5 seconds  Gradually work up to squeezing for 10 seconds  Do 3 sets of 15 repetitions a day, or as directed  This will help strengthen your pelvic muscles and improve bladder control  Train your bladder:  Go to the bathroom at set times, such as every 2 hours, even if you do not feel the urge to go  You can also try to hold your urine when you feel the urge to go  For example, hold your urine for 5 minutes when you feel the urge to go  As that becomes easier, hold your urine for 10 minutes  Self-care:   · Keep a UI record  Write down how often you leak urine and how much you leak  Make a note of what you were doing when you leaked urine  · Drink liquids as directed  You may need to limit the amount of liquid you drink to help control your urine leakage  Do not drink any liquid right before you go to bed  Limit or do not have drinks that contain caffeine or alcohol  · Prevent constipation  Eat a variety of high-fiber foods  Good examples are high-fiber cereals, beans, vegetables, and whole-grain breads  Walking is the best way to trigger your intestines to have a bowel movement  · Exercise regularly and maintain a healthy weight  Weight loss and exercise will decrease pressure on your bladder and help you control your leakage  · Use a catheter as directed  to help empty your bladder  A catheter is a tiny, plastic tube that is put into your bladder to drain your urine  · Go to behavior therapy as directed  Behavior therapy may be used to help you learn to control your urge to urinate      Weight Management   Why it is important to manage your weight:  Being overweight increases your risk of health conditions such as heart disease, high blood pressure, type 2 diabetes, and certain types of cancer  It can also increase your risk for osteoarthritis, sleep apnea, and other respiratory problems  Aim for a slow, steady weight loss  Even a small amount of weight loss can lower your risk of health problems  How to lose weight safely:  A safe and healthy way to lose weight is to eat fewer calories and get regular exercise  You can lose up about 1 pound a week by decreasing the number of calories you eat by 500 calories each day  Healthy meal plan for weight management:  A healthy meal plan includes a variety of foods, contains fewer calories, and helps you stay healthy  A healthy meal plan includes the following:  · Eat whole-grain foods more often  A healthy meal plan should contain fiber  Fiber is the part of grains, fruits, and vegetables that is not broken down by your body  Whole-grain foods are healthy and provide extra fiber in your diet  Some examples of whole-grain foods are whole-wheat breads and pastas, oatmeal, brown rice, and bulgur  · Eat a variety of vegetables every day  Include dark, leafy greens such as spinach, kale, kofi greens, and mustard greens  Eat yellow and orange vegetables such as carrots, sweet potatoes, and winter squash  · Eat a variety of fruits every day  Choose fresh or canned fruit (canned in its own juice or light syrup) instead of juice  Fruit juice has very little or no fiber  · Eat low-fat dairy foods  Drink fat-free (skim) milk or 1% milk  Eat fat-free yogurt and low-fat cottage cheese  Try low-fat cheeses such as mozzarella and other reduced-fat cheeses  · Choose meat and other protein foods that are low in fat  Choose beans or other legumes such as split peas or lentils  Choose fish, skinless poultry (chicken or turkey), or lean cuts of red meat (beef or pork)  Before you cook meat or poultry, cut off any visible fat  · Use less fat and oil    Try baking foods instead of frying them  Add less fat, such as margarine, sour cream, regular salad dressing and mayonnaise to foods  Eat fewer high-fat foods  Some examples of high-fat foods include french fries, doughnuts, ice cream, and cakes  · Eat fewer sweets  Limit foods and drinks that are high in sugar  This includes candy, cookies, regular soda, and sweetened drinks  Exercise:  Exercise at least 30 minutes per day on most days of the week  Some examples of exercise include walking, biking, dancing, and swimming  You can also fit in more physical activity by taking the stairs instead of the elevator or parking farther away from stores  Ask your healthcare provider about the best exercise plan for you  © Copyright [employer] 2018 Information is for End User's use only and may not be sold, redistributed or otherwise used for commercial purposes   All illustrations and images included in CareNotes® are the copyrighted property of [employer]  or [address] [place] [address]

## 2020-07-06 NOTE — PROGRESS NOTES
Assessment and Plan:     Problem List Items Addressed This Visit     None           Preventive health issues were discussed with patient, and age appropriate screening tests were ordered as noted in patient's After Visit Summary  Personalized health advice and appropriate referrals for health education or preventive services given if needed, as noted in patient's After Visit Summary       History of Present Illness:     Patient presents for Medicare Annual Wellness visit    Patient Care Team:  Lorie Lechuga MD as PCP - General (Internal Medicine)  Pretty Flower MD     Problem List:     Patient Active Problem List   Diagnosis    Abnormal stress ECG with treadmill    Moeller esophagus    Benign essential hypertension    Bilateral carotid artery stenosis    Cataract    Colon, diverticulosis    Costochondritis    Generalized osteoarthritis of multiple sites    Obstructive sleep apnea    SOB (shortness of breath)    BMI 36 0-36 9,adult    Seasonal allergic rhinitis due to pollen    Nocturnal hypoxemia      Past Medical and Surgical History:     Past Medical History:   Diagnosis Date    Anxiety     Bleeding from varicose vein     Environmental allergies     Fracture of radius     Obesity     Scoliosis     mild     Vaginitis     Vulvovaginitis      Past Surgical History:   Procedure Laterality Date    EYE SURGERY      cataract    TUBAL LIGATION        Family History:     Family History   Problem Relation Age of Onset    Colon cancer Maternal Aunt     Stroke Mother       Social History:        Social History     Socioeconomic History    Marital status: /Civil Union     Spouse name: Not on file    Number of children: Not on file    Years of education: Not on file    Highest education level: Not on file   Occupational History    Not on file   Social Needs    Financial resource strain: Not on file    Food insecurity:     Worry: Not on file     Inability: Not on file   [employer] needs:      Medical: Not on file     Non-medical: Not on file   Tobacco Use    Smoking status: Never Smoker    Smokeless tobacco: Never Used   Substance and Sexual Activity    Alcohol use: Yes     Frequency: Monthly or less     Drinks per session: 1 or 2     Binge frequency: Never     Comment: social     Drug use: No    Sexual activity: Not on file   Lifestyle    Physical activity:     Days per week: Not on file     Minutes per session: Not on file    Stress: Not on file   Relationships    Social connections:     Talks on phone: Not on file     Gets together: Not on file     Attends Oriental orthodox service: Not on file     Active member of club or organization: Not on file     Attends meetings of clubs or organizations: Not on file     Relationship status: Not on file    Intimate partner violence:     Fear of current or ex partner: Not on file     Emotionally abused: Not on file     Physically abused: Not on file     Forced sexual activity: Not on file   Other Topics Concern    Not on file   Social History Narrative    Not on file      Medications and Allergies:     Current Outpatient Medications   Medication Sig Dispense Refill    aspirin 81 mg chewable tablet Chew 1 tablet daily      B COMPLEX VITAMINS PO Take by mouth      b complex vitamins tablet Take 1 tablet by mouth daily      fluticasone (FLONASE) 50 mcg/act nasal spray 2 sprays into each nostril daily as needed for rhinitis 1 Bottle 6    ipratropium (ATROVENT HFA) 17 mcg/act inhaler Inhale 2 puffs every 6 (six) hours 1 Inhaler 2    methylPREDNISolone 4 MG tablet therapy pack Use as directed on package (Patient not taking: Reported on 7/22/2019) 21 each 0    Naproxen Sodium (ALEVE) 220 MG CAPS Take 1 capsule by mouth daily        nystatin-triamcinolone (MYCOLOG-II) cream Apply topically as needed        promethazine-dextromethorphan (PHENERGAN-DM) 6 25-15 mg/5 mL oral syrup Take 5 mL by mouth 4 (four) times a day as needed for cough (Patient not taking: Reported on 5/30/2019) 118 mL 0    sodium chloride (AYR SALINE NASAL) nasal gel 1 application into each nostril every hour as needed      tobramycin (TOBREX) 0 3 % SOLN Administer 1 drop to the right eye every 4 (four) hours while awake (Patient not taking: Reported on 7/22/2019) 5 mL 0     No current facility-administered medications for this visit  Allergies   Allergen Reactions    Penicillins     Sulfa Antibiotics       Immunizations:     Immunization History   Administered Date(s) Administered    Pneumococcal Conjugate 13-Valent 02/25/2019    Pneumococcal Polysaccharide PPV23 10/01/2006      Health Maintenance:         Topic Date Due    CRC Screening: Colonoscopy  05/15/2024         Topic Date Due    DTaP,Tdap,and Td Vaccines (1 - Tdap) 04/30/1952    Influenza Vaccine  07/01/2020      Medicare Health Risk Assessment:     There were no vitals taken for this visit  Roxanne Esquivel is here for her Subsequent Wellness visit  Health Risk Assessment:   Patient rates overall health as good  Patient feels that their physical health rating is same  Eyesight was rated as same  Hearing was rated as same  Patient feels that their emotional and mental health rating is same  Pain experienced in the last 7 days has been some  Patient's pain rating has been 3/10  Patient states that she has experienced no weight loss or gain in last 6 months  Depression Screening:   PHQ-2 Score: 0      Fall Risk Screening: In the past year, patient has experienced: no history of falling in past year      Urinary Incontinence Screening:   Patient has leaked urine accidently in the last six months  Home Safety:  Patient does not have trouble with stairs inside or outside of their home  Patient has working smoke alarms and has working carbon monoxide detector  Home safety hazards include: none  Nutrition:   Current diet is Regular  Medications:   Patient is currently taking over-the-counter supplements  OTC medications include: see medication list  Patient is able to manage medications  Activities of Daily Living (ADLs)/Instrumental Activities of Daily Living (IADLs):   Walk and transfer into and out of bed and chair?: Yes  Dress and groom yourself?: Yes    Bathe or shower yourself?: Yes    Feed yourself? Yes  Do your laundry/housekeeping?: Yes  Manage your money, pay your bills and track your expenses?: Yes  Make your own meals?: Yes    Do your own shopping?: Yes    Previous Hospitalizations:   Any hospitalizations or ED visits within the last 12 months?: No      Advance Care Planning:   Living will: Yes    Advanced directive: Yes    End of Life Decisions reviewed with patient: Yes      Cognitive Screening:   Provider or family/friend/caregiver concerned regarding cognition?: No    PREVENTIVE SCREENINGS      Cardiovascular Screening:    General: Screening Current      Diabetes Screening:     General: Screening Current      Colorectal Cancer Screening:     General: Screening Current      Breast Cancer Screening:     General: Screening Current      Cervical Cancer Screening:    General: Screening Not Indicated      Osteoporosis Screening:    General: Risks and Benefits Discussed    Due for: DXA Axial      Abdominal Aortic Aneurysm (AAA) Screening:        General: Screening Not Indicated      Lung Cancer Screening:     General: Screening Not Indicated      Hepatitis C Screening:    General: Screening Not Indicated    Other Counseling Topics:   Alcohol use counseling, car/seat belt/driving safety, skin self-exam, sunscreen and calcium and vitamin D intake and regular weightbearing exercise  Physical Exam   Constitutional: She is oriented to person, place, and time  She appears well-developed and well-nourished  No distress  HENT:   Head: Normocephalic and atraumatic     Right Ear: External ear normal    Left Ear: External ear normal    Nose: Nose normal    Mouth/Throat: Oropharynx is clear and moist    Eyes: Pupils are equal, round, and reactive to light  Conjunctivae and EOM are normal    Neck: Normal range of motion  Neck supple  No JVD present  No thyromegaly present  Cardiovascular: Normal rate, regular rhythm, normal heart sounds and intact distal pulses  Exam reveals no gallop and no friction rub  No murmur heard  Pulmonary/Chest: Effort normal and breath sounds normal  She has no wheezes  She has no rales  Abdominal: Soft  Bowel sounds are normal  She exhibits no distension  There is no tenderness  Musculoskeletal: Normal range of motion  She exhibits no edema  Neurological: She is alert and oriented to person, place, and time  She has normal reflexes  Skin: Skin is warm and dry  No rash noted  Psychiatric: She has a normal mood and affect  Her behavior is normal  Judgment and thought content normal      BMI Counseling: Body mass index is 38 28 kg/m²  The BMI is above normal  Nutrition recommendations include reducing portion sizes and decreasing overall calorie intake  Exercise recommendations include moderate aerobic physical activity for 150 minutes/week      Tiara Loredo MD

## 2020-08-13 ENCOUNTER — TELEPHONE (OUTPATIENT)
Dept: PULMONOLOGY | Facility: MEDICAL CENTER | Age: 80
End: 2020-08-13

## 2020-08-13 NOTE — TELEPHONE ENCOUNTER
COVID Pre-Visit Screening     1  Is this a family member screening? no  2  Have you traveled outside of your state in the past 2 weeks? no  3  Do you presently have a fever or flu-like symptoms? no  4  Do you have symptoms of an upper respiratory infection like runny nose, sore throat, or cough? no  5  Are you suffering from new headache that you have not had in the past?  no  6  Do you have/have you experienced any new shortness of breath recently? no  7  Do you have any new diarrhea, nausea or vomiting? no  8  Have you been in contact with anyone who has been sick or diagnosed with COVID-19? no  9  Do you have any new loss of taste or smell? No   Are you able to wear a mask without a valve for the entire visit?  yes

## 2020-08-14 ENCOUNTER — OFFICE VISIT (OUTPATIENT)
Dept: PULMONOLOGY | Facility: MEDICAL CENTER | Age: 80
End: 2020-08-14
Payer: MEDICARE

## 2020-08-14 VITALS
DIASTOLIC BLOOD PRESSURE: 70 MMHG | BODY MASS INDEX: 37.19 KG/M2 | HEART RATE: 70 BPM | OXYGEN SATURATION: 97 % | RESPIRATION RATE: 12 BRPM | HEIGHT: 61 IN | WEIGHT: 197 LBS | TEMPERATURE: 97.4 F | SYSTOLIC BLOOD PRESSURE: 120 MMHG

## 2020-08-14 DIAGNOSIS — J45.20 MILD INTERMITTENT ASTHMA WITHOUT COMPLICATION: ICD-10-CM

## 2020-08-14 DIAGNOSIS — G47.33 OBSTRUCTIVE SLEEP APNEA: ICD-10-CM

## 2020-08-14 DIAGNOSIS — R07.89 LEFT-SIDED CHEST WALL PAIN: Primary | ICD-10-CM

## 2020-08-14 DIAGNOSIS — J30.1 SEASONAL ALLERGIC RHINITIS DUE TO POLLEN: ICD-10-CM

## 2020-08-14 PROCEDURE — 3078F DIAST BP <80 MM HG: CPT | Performed by: INTERNAL MEDICINE

## 2020-08-14 PROCEDURE — 99214 OFFICE O/P EST MOD 30 MIN: CPT | Performed by: INTERNAL MEDICINE

## 2020-08-14 PROCEDURE — 3074F SYST BP LT 130 MM HG: CPT | Performed by: INTERNAL MEDICINE

## 2020-08-14 PROCEDURE — 1036F TOBACCO NON-USER: CPT | Performed by: INTERNAL MEDICINE

## 2020-08-14 PROCEDURE — 1160F RVW MEDS BY RX/DR IN RCRD: CPT | Performed by: INTERNAL MEDICINE

## 2020-08-14 PROCEDURE — 4040F PNEUMOC VAC/ADMIN/RCVD: CPT | Performed by: INTERNAL MEDICINE

## 2020-08-14 RX ORDER — ALBUTEROL SULFATE 90 UG/1
2 AEROSOL, METERED RESPIRATORY (INHALATION) EVERY 6 HOURS PRN
Qty: 18 G | Refills: 6 | Status: SHIPPED | OUTPATIENT
Start: 2020-08-14

## 2020-08-14 NOTE — PROGRESS NOTES
Assessment/Plan        Problem List Items Addressed This Visit        Respiratory    Obstructive sleep apnea     Has history of moderate GEORGINA but is not using CPAP  She denies any excessive daytime somnolence  I did encourage her to lose weight         Seasonal allergic rhinitis due to pollen     She does have mild seasonal allergic rhinitis and I did tell she can use the use Flonase or singing nasal spray to help with this  She does not have a that bed is not seem to need any regular antihistamine therapy  Mild intermittent asthma without complication     She is presently not have any problem with her mild intermittent asthma  I did give her a prescription for a Ventolin inhaler that she can use 2 puffs 4 times a day as needed  Relevant Medications    albuterol (Ventolin HFA) 90 mcg/act inhaler       Other    Left-sided chest wall pain - Primary     She has been having this left-sided chest wall pain for several months now  I suspect this is more costochondritis  However I did order CT scan of chest to better evaluate this  This will be done without contrast   Did tell she can try Aleve or Tylenol over-the-counter this would help         Relevant Orders    CT chest without contrast            Follow-up (6 M)      HPI   Patient is complaining of left sided chest wall pain  She was previously provided with a CT chest order but states she never followed up with it  Patient states that the pain may be related to er cleaning habits at home  Patient uses Atrovent periodically  She has had this left lower anterior chest wall pain 1st several months  It seems to be improving  She states she does know she often leads on her left chest wall this may be the cause of her discomfort  Not having any weight loss  Right now not having difficulty breathing  She does have history of moderate GEORGINA but is not using CPAP  He denies any shortness of breath at night or excessive daytime discomfort    This pain her left anterior lower chest is likely costochondritis  She has even being evaluated by surgeon Dr Anton Avila who also thought patient just had costochondritis  She does have seasonal allergic rhinitis due to pollen  She is not complaining of any difficulty breathing with her usual activities  She does work as a hairdresser  She is not using any inhaler therapy at this time    Previous spirometry showed lung volumes to be normal    Last chest x-ray done April 2018 was normal   She never smoked    Past Medical History:   Diagnosis Date    Anxiety     Bleeding from varicose vein     Environmental allergies     Fracture of radius     Obesity     Scoliosis     mild     Vaginitis     Vulvovaginitis        Past Surgical History:   Procedure Laterality Date    EYE SURGERY      cataract    TUBAL LIGATION           Current Outpatient Medications:     aspirin 81 mg chewable tablet, Chew 1 tablet daily, Disp: , Rfl:     B COMPLEX VITAMINS PO, Take by mouth, Disp: , Rfl:     fluticasone (FLONASE) 50 mcg/act nasal spray, 2 sprays into each nostril daily as needed for rhinitis, Disp: 1 Bottle, Rfl: 6    ipratropium (ATROVENT HFA) 17 mcg/act inhaler, Inhale 2 puffs every 6 (six) hours, Disp: 1 Inhaler, Rfl: 2    Naproxen Sodium (ALEVE) 220 MG CAPS, Take 1 capsule by mouth daily  , Disp: , Rfl:     nystatin-triamcinolone (MYCOLOG-II) cream, Apply topically as needed  , Disp: , Rfl:     sodium chloride (AYR SALINE NASAL) nasal gel, 1 application into each nostril every hour as needed, Disp: , Rfl:     albuterol (Ventolin HFA) 90 mcg/act inhaler, Inhale 2 puffs every 6 (six) hours as needed for wheezing, Disp: 18 g, Rfl: 6    Allergies   Allergen Reactions    Penicillins     Sulfa Antibiotics        Social History     Tobacco Use    Smoking status: Never Smoker    Smokeless tobacco: Never Used   Substance Use Topics    Alcohol use: Yes     Frequency: Monthly or less     Drinks per session: 1 or 2 Binge frequency: Never     Comment: social          Family History   Problem Relation Age of Onset    Colon cancer Maternal Aunt     Stroke Mother        Review of Systems   Constitutional: Negative for chills, fever and unexpected weight change  HENT: Negative for congestion, rhinorrhea and sore throat  Eyes: Negative for discharge and redness  Respiratory: Negative for shortness of breath  Cardiovascular: Negative for chest pain, palpitations and leg swelling  Gastrointestinal: Negative for abdominal distention, abdominal pain and nausea  Endocrine: Negative for polydipsia and polyphagia  Genitourinary: Negative for dysuria  Musculoskeletal: Negative for joint swelling and myalgias  Skin: Negative for rash  Neurological: Negative for light-headedness  Psychiatric/Behavioral: Negative for confusion and decreased concentration  Vitals:    08/14/20 0759   BP: 120/70   Pulse: 70   Resp: 12   Temp: (!) 97 4 °F (36 3 °C)   SpO2: 97%           Physical Exam  Vitals signs reviewed  Constitutional:       General: She is not in acute distress  Appearance: She is well-developed  Comments: Patient is overweight   HENT:      Head: Normocephalic  Nose: Nose normal       Mouth/Throat:      Mouth: Mucous membranes are moist       Pharynx: Oropharynx is clear  No oropharyngeal exudate  Eyes:      Conjunctiva/sclera: Conjunctivae normal       Pupils: Pupils are equal, round, and reactive to light  Neck:      Musculoskeletal: Neck supple  No muscular tenderness  Cardiovascular:      Rate and Rhythm: Normal rate and regular rhythm  Heart sounds: Normal heart sounds  Pulmonary:      Effort: Pulmonary effort is normal       Comments: Lung sounds are clear  No wheezes, crackles or rhonchi  Abdominal:      General: There is no distension  Palpations: Abdomen is soft  Tenderness: There is no abdominal tenderness     Musculoskeletal:      Comments: No edema, cyanosis or clubbing   Lymphadenopathy:      Cervical: No cervical adenopathy  Skin:     General: Skin is warm and dry  Neurological:      Mental Status: She is alert and oriented to person, place, and time

## 2020-08-15 PROBLEM — R07.89 LEFT-SIDED CHEST WALL PAIN: Status: ACTIVE | Noted: 2020-08-15

## 2020-08-15 PROBLEM — J45.20 MILD INTERMITTENT ASTHMA WITHOUT COMPLICATION: Status: ACTIVE | Noted: 2020-08-15

## 2020-08-16 NOTE — ASSESSMENT & PLAN NOTE
Has history of moderate GEORGINA but is not using CPAP  She denies any excessive daytime somnolence    I did encourage her to lose weight

## 2020-08-16 NOTE — ASSESSMENT & PLAN NOTE
She has been having this left-sided chest wall pain for several months now  I suspect this is more costochondritis  However I did order CT scan of chest to better evaluate this    This will be done without contrast   Did tell she can try Aleve or Tylenol over-the-counter this would help

## 2020-08-16 NOTE — ASSESSMENT & PLAN NOTE
She is presently not have any problem with her mild intermittent asthma  I did give her a prescription for a Ventolin inhaler that she can use 2 puffs 4 times a day as needed

## 2020-08-16 NOTE — ASSESSMENT & PLAN NOTE
She does have mild seasonal allergic rhinitis and I did tell she can use the use Flonase or singing nasal spray to help with this  She does not have a that bed is not seem to need any regular antihistamine therapy

## 2020-08-20 ENCOUNTER — HOSPITAL ENCOUNTER (OUTPATIENT)
Dept: RADIOLOGY | Facility: HOSPITAL | Age: 80
Discharge: HOME | End: 2020-08-20
Attending: INTERNAL MEDICINE
Payer: MEDICARE

## 2020-08-20 DIAGNOSIS — R07.89 LEFT-SIDED CHEST WALL PAIN: ICD-10-CM

## 2020-08-20 DIAGNOSIS — N95.9 MENOPAUSAL DISORDER: ICD-10-CM

## 2020-08-20 PROCEDURE — G1004 CDSM NDSC: HCPCS

## 2020-08-20 PROCEDURE — 71250 CT THORAX DX C-: CPT

## 2020-08-20 PROCEDURE — 77080 DXA BONE DENSITY AXIAL: CPT

## 2020-08-24 ENCOUNTER — TELEPHONE (OUTPATIENT)
Dept: PULMONOLOGY | Facility: MEDICAL CENTER | Age: 80
End: 2020-08-24

## 2020-08-25 ENCOUNTER — TELEPHONE (OUTPATIENT)
Dept: PULMONOLOGY | Facility: MEDICAL CENTER | Age: 80
End: 2020-08-25

## 2020-08-25 NOTE — TELEPHONE ENCOUNTER
Discussed results of CT scan w/ patient in entirety  She displays understanding of results  She is low risk for lung CA  Discussed offering 1 year follow up CT scan  She would like to wait to discuss it with Dr Nikki Null at her next office appointment on 1/15/2021  I advised that is perfectly fine and appropriate course of action  She asked that I place the results of her CT scan in her chart so he can find it  CT scan results listed as follows:    CT CHEST WITHOUT IV CONTRAST     INDICATION:   R07 89: Other chest pain      COMPARISON:  None      TECHNIQUE: CT examination of the chest was performed without intravenous contrast   Axial, sagittal, and coronal 2D reformatted images were created from the source data and submitted for interpretation      Radiation dose length product (DLP) for this visit:  350 48 mGy-cm   This examination, like all CT scans performed in the [facility], was performed utilizing techniques to minimize radiation dose exposure, including the use of iterative   reconstruction and automated exposure control       FINDINGS:     LUNGS:  3 mm nodule at the left lower lobe (series 3, image 62)  There is no tracheal or endobronchial lesion      PLEURA:  Unremarkable      HEART/GREAT VESSELS:  Atherosclerotic changes are noted in thoracic aorta and coronary arteries      MEDIASTINUM AND MOHINDER:  Unremarkable      CHEST WALL AND LOWER NECK:   Unremarkable      VISUALIZED STRUCTURES IN THE UPPER ABDOMEN:  Unremarkable      OSSEOUS STRUCTURES:  No acute fracture or destructive osseous lesion      IMPRESSION:     No evidence of acute intrathoracic pathology      3 mm left lower lobe nodule  Based on current Fleischner Society 2017 Guidelines on incidental pulmonary nodule, no routine follow-up is needed if the patient is considered low risk for lung cancer    If the patient is considered high risk for lung   cancer, 12 month follow-up non-contrast chest CT is recommended

## 2021-01-18 ENCOUNTER — IMMUNIZATIONS (OUTPATIENT)
Dept: FAMILY MEDICINE CLINIC | Facility: HOSPITAL | Age: 81
End: 2021-01-18

## 2021-01-18 DIAGNOSIS — Z23 ENCOUNTER FOR IMMUNIZATION: Primary | ICD-10-CM

## 2021-01-18 PROCEDURE — 0001A SARS-COV-2 / COVID-19 MRNA VACCINE (PFIZER-BIONTECH) 30 MCG: CPT

## 2021-01-18 PROCEDURE — 91300 SARS-COV-2 / COVID-19 MRNA VACCINE (PFIZER-BIONTECH) 30 MCG: CPT

## 2021-01-29 ENCOUNTER — OFFICE VISIT (OUTPATIENT)
Dept: PULMONOLOGY | Facility: MEDICAL CENTER | Age: 81
End: 2021-01-29
Payer: MEDICARE

## 2021-01-29 VITALS
SYSTOLIC BLOOD PRESSURE: 132 MMHG | HEIGHT: 61 IN | BODY MASS INDEX: 38.33 KG/M2 | TEMPERATURE: 97.1 F | RESPIRATION RATE: 12 BRPM | DIASTOLIC BLOOD PRESSURE: 82 MMHG | OXYGEN SATURATION: 96 % | HEART RATE: 97 BPM | WEIGHT: 203 LBS

## 2021-01-29 DIAGNOSIS — J45.20 MILD INTERMITTENT ASTHMA WITHOUT COMPLICATION: Primary | ICD-10-CM

## 2021-01-29 DIAGNOSIS — J30.1 SEASONAL ALLERGIC RHINITIS DUE TO POLLEN: ICD-10-CM

## 2021-01-29 PROCEDURE — 99213 OFFICE O/P EST LOW 20 MIN: CPT | Performed by: INTERNAL MEDICINE

## 2021-01-29 RX ORDER — LEVALBUTEROL TARTRATE 45 UG/1
1-2 AEROSOL, METERED ORAL EVERY 4 HOURS PRN
Qty: 1 INHALER | Refills: 7 | Status: SHIPPED | OUTPATIENT
Start: 2021-01-29 | End: 2021-09-09

## 2021-01-30 NOTE — ASSESSMENT & PLAN NOTE
Doing well at present  Measure peak flow rate today was 340 liters/minutes which is better than 80% of predicted  Her peak flow rate range is 320-390 liters/minute    Gerhardt Pun does not need any maintenance inhaler at present  As she thinks she got palpitations for Ventolin and past I gave her prescription for Xopenex MDI she use 1-2 puffs as needed for any wheezing    Also if this caused her palpitation she can just use Atrovent MDI which she has 2 puffs 4 times a day as needed     I did give her a peak flow meter today

## 2021-01-30 NOTE — PROGRESS NOTES
Assessment/Plan        Problem List Items Addressed This Visit        Respiratory    Seasonal allergic rhinitis due to pollen      Not having any allergy symptoms at this time  She does use Flonase when needed         Mild intermittent asthma without complication - Primary      Doing well at present  Measure peak flow rate today was 340 liters/minutes which is better than 80% of predicted  Her peak flow rate range is 320-390 liters/minute    Tom Lopez does not need any maintenance inhaler at present  As she thinks she got palpitations for Ventolin and past I gave her prescription for Xopenex MDI she use 1-2 puffs as needed for any wheezing  Also if this caused her palpitation she can just use Atrovent MDI which she has 2 puffs 4 times a day as needed     I did give her a peak flow meter today         Relevant Medications    levalbuterol (XOPENEX HFA) 45 mcg/act inhaler            CC:   Doing well  Not having any shortness of breath with activity      HPI      Tom Lopez has been doing well  She has mild intermittent asthma and right now is not needing any regular inhaler therapy     She has used Atrovent in the recent past as this did not cause her palpitations  She caused regular strength albuterol sometimes because her have some palpitations  She not had any recent respiratory tract infections and is not having any wheezing or shortness of breath  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 as she did not want to risk in COVID-19 and give it to her  was very severe lung disease  Tom Lopez did get 1st dose of COVID-19 vaccine on 01/19  [employer]  No nocturnal wheezing or cough  She did have CT  Scan of her chest without contrast back in August 2020 when she was having some left chest wall pain  This showed a small 3 mm left lower lobe nodule otherwise lung parenchymal chest wall normal   Her pain resolved several months ago she has not had any since then    She never smoked socially does not need follow-up CT scan of chest in regard to the small 3 mm left lower lobe lung nodule  She does have seasonal allergic rhinitis but has not had any problem now  Also has history of moderate GEORGINA but  does not want to use CPAP  She is not have any excessive daytime somnolence  No nocturnal dyspnea    Last spirometry showed her lung volumes to be normal     Past Medical History:   Diagnosis Date    Anxiety     Bleeding from varicose vein     Environmental allergies     Fracture of radius     Obesity     Scoliosis     mild     Vaginitis     Vulvovaginitis        Past Surgical History:   Procedure Laterality Date    EYE SURGERY      cataract    TUBAL LIGATION           Current Outpatient Medications:     albuterol (Ventolin HFA) 90 mcg/act inhaler, Inhale 2 puffs every 6 (six) hours as needed for wheezing, Disp: 18 g, Rfl: 6    aspirin 81 mg chewable tablet, Chew 1 tablet daily, Disp: , Rfl:     B COMPLEX VITAMINS PO, Take by mouth, Disp: , Rfl:     fluticasone (FLONASE) 50 mcg/act nasal spray, 2 sprays into each nostril daily as needed for rhinitis, Disp: 1 Bottle, Rfl: 6    ipratropium (ATROVENT HFA) 17 mcg/act inhaler, Inhale 2 puffs every 6 (six) hours, Disp: 1 Inhaler, Rfl: 2    Naproxen Sodium (ALEVE) 220 MG CAPS, Take 1 capsule by mouth daily  , Disp: , Rfl:     nystatin-triamcinolone (MYCOLOG-II) cream, Apply topically as needed  , Disp: , Rfl:     sodium chloride (AYR SALINE NASAL) nasal gel, 1 application into each nostril every hour as needed, Disp: , Rfl:     levalbuterol (XOPENEX HFA) 45 mcg/act inhaler, Inhale 1-2 puffs every 4 (four) hours as needed for wheezing, Disp: 1 Inhaler, Rfl: 7    Allergies   Allergen Reactions    Penicillins     Sulfa Antibiotics        Social History     Tobacco Use    Smoking status: Never Smoker    Smokeless tobacco: Never Used   Substance Use Topics    Alcohol use: Yes     Frequency: Monthly or less     Drinks per session: 1 or 2     Binge frequency: Never     Comment: social          Family History   Problem Relation Age of Onset    Colon cancer Maternal Aunt     Stroke Mother        Review of Systems   Constitutional: Negative for chills, fever and unexpected weight change  HENT: Negative for congestion, rhinorrhea and sore throat  Eyes: Negative for discharge and redness  Respiratory: Negative for cough, shortness of breath and wheezing  Cardiovascular: Negative for chest pain, palpitations and leg swelling  Gastrointestinal: Negative for abdominal distention, abdominal pain and nausea  Endocrine: Negative for polydipsia and polyphagia  Genitourinary: Negative for dysuria  Musculoskeletal: Negative for joint swelling and myalgias  Skin: Negative for rash  Neurological: Negative for light-headedness  Psychiatric/Behavioral: Negative for decreased concentration  Vitals:    01/29/21 0908   BP: 132/82   Pulse: 97   Resp: 12   Temp: (!) 97 1 °F (36 2 °C)   SpO2: 96%           Physical Exam  Constitutional:       General: She is not in acute distress  Appearance: She is well-developed  HENT:      Head: Normocephalic  Nose: Nose normal       Mouth/Throat:      Mouth: Mucous membranes are moist       Pharynx: Oropharynx is clear  No oropharyngeal exudate  Eyes:      Conjunctiva/sclera: Conjunctivae normal       Pupils: Pupils are equal, round, and reactive to light  Neck:      Musculoskeletal: Neck supple  No muscular tenderness  Cardiovascular:      Rate and Rhythm: Normal rate and regular rhythm  Heart sounds: Normal heart sounds  Pulmonary:      Effort: Pulmonary effort is normal       Comments:   Lung sounds are clear  No wheezes, crackles, or rhonchi  Abdominal:      General: There is no distension  Palpations: Abdomen is soft  Tenderness: There is no abdominal tenderness     Musculoskeletal:      Comments:  No edema, cyanosis or clubbing   Lymphadenopathy: Cervical: No cervical adenopathy  Skin:     General: Skin is warm and dry  Neurological:      Mental Status: She is alert and oriented to person, place, and time  Psychiatric:         Mood and Affect: Mood normal          Behavior: Behavior normal          Thought Content:  Thought content normal        Peak flow rate measurement;    measured peak flow rate is 340 liters/minute and predicted is 390 liters/minute

## 2021-02-06 ENCOUNTER — IMMUNIZATIONS (OUTPATIENT)
Dept: FAMILY MEDICINE CLINIC | Facility: HOSPITAL | Age: 81
End: 2021-02-06

## 2021-02-06 DIAGNOSIS — Z23 ENCOUNTER FOR IMMUNIZATION: Primary | ICD-10-CM

## 2021-02-06 PROCEDURE — 91300 SARS-COV-2 / COVID-19 MRNA VACCINE (PFIZER-BIONTECH) 30 MCG: CPT

## 2021-02-06 PROCEDURE — 0002A SARS-COV-2 / COVID-19 MRNA VACCINE (PFIZER-BIONTECH) 30 MCG: CPT

## 2021-09-01 NOTE — PROGRESS NOTES
Assessment/Plan:    There are no diagnoses linked to this encounter  BMI Counseling: There is no height or weight on file to calculate BMI  The BMI {VB BMI Counselin}  There are no Patient Instructions on file for this visit  No follow-ups on file  Subjective:      Patient ID: Mitzi Mcgarry is a [de-identified] y o  female  No chief complaint on file  HPI    The following portions of the patient's history were reviewed and updated as appropriate: allergies, current medications, past family history, past medical history, past social history, past surgical history and problem list     Review of Systems   Constitutional: Negative  Respiratory: Negative  Cardiovascular: Negative  Endocrine: Negative  Current Outpatient Medications   Medication Sig Dispense Refill    albuterol (Ventolin HFA) 90 mcg/act inhaler Inhale 2 puffs every 6 (six) hours as needed for wheezing 18 g 6    aspirin 81 mg chewable tablet Chew 1 tablet daily      B COMPLEX VITAMINS PO Take by mouth      fluticasone (FLONASE) 50 mcg/act nasal spray 2 sprays into each nostril daily as needed for rhinitis 1 Bottle 6    ipratropium (ATROVENT HFA) 17 mcg/act inhaler Inhale 2 puffs every 6 (six) hours 1 Inhaler 2    levalbuterol (XOPENEX HFA) 45 mcg/act inhaler Inhale 1-2 puffs every 4 (four) hours as needed for wheezing 1 Inhaler 7    Naproxen Sodium (ALEVE) 220 MG CAPS Take 1 capsule by mouth daily        nystatin-triamcinolone (MYCOLOG-II) cream Apply topically as needed        sodium chloride (AYR SALINE NASAL) nasal gel 1 application into each nostril every hour as needed       No current facility-administered medications for this visit  Objective: There were no vitals taken for this visit         Physical Exam           Radha Nice MD

## 2021-09-09 ENCOUNTER — OFFICE VISIT (OUTPATIENT)
Dept: FAMILY MEDICINE CLINIC | Facility: CLINIC | Age: 81
End: 2021-09-09
Payer: MEDICARE

## 2021-09-09 VITALS
HEART RATE: 71 BPM | DIASTOLIC BLOOD PRESSURE: 70 MMHG | SYSTOLIC BLOOD PRESSURE: 128 MMHG | TEMPERATURE: 97.5 F | HEIGHT: 61 IN | RESPIRATION RATE: 16 BRPM | BODY MASS INDEX: 37.84 KG/M2 | WEIGHT: 200.4 LBS

## 2021-09-09 DIAGNOSIS — R26.89 BALANCE PROBLEM: ICD-10-CM

## 2021-09-09 DIAGNOSIS — Z00.00 MEDICARE ANNUAL WELLNESS VISIT, SUBSEQUENT: Primary | ICD-10-CM

## 2021-09-09 PROCEDURE — 1123F ACP DISCUSS/DSCN MKR DOCD: CPT | Performed by: INTERNAL MEDICINE

## 2021-09-09 PROCEDURE — G0439 PPPS, SUBSEQ VISIT: HCPCS | Performed by: INTERNAL MEDICINE

## 2021-09-09 NOTE — PROGRESS NOTES
Assessment and Plan:     Problem List Items Addressed This Visit        Other    BMI 37 0-37 9, adult      Other Visit Diagnoses     Medicare annual wellness visit, subsequent    -  Primary    Relevant Orders    CBC and differential    Comprehensive metabolic panel    Lipid panel    Balance problem        Relevant Orders    Ambulatory referral to Physical Therapy           Preventive health issues were discussed with patient, and age appropriate screening tests were ordered as noted in patient's After Visit Summary  Personalized health advice and appropriate referrals for health education or preventive services given if needed, as noted in patient's After Visit Summary       History of Present Illness:     Patient presents for Medicare Annual Wellness visit    Patient Care Team:  Radha Nice MD as PCP - General (Internal Medicine)  Lisa Martinez MD     Problem List:     Patient Active Problem List   Diagnosis    Abnormal stress ECG with treadmill    Moeller esophagus    Benign essential hypertension    Bilateral carotid artery stenosis    Colon, diverticulosis    Costochondritis    Generalized osteoarthritis of multiple sites    Obstructive sleep apnea    SOB (shortness of breath)    BMI 37 0-37 9, adult    Seasonal allergic rhinitis due to pollen    Nocturnal hypoxemia    Mild intermittent asthma without complication    Left-sided chest wall pain      Past Medical and Surgical History:     Past Medical History:   Diagnosis Date    Anxiety     Bleeding from varicose vein     Environmental allergies     Fracture of radius     Obesity     Scoliosis     mild     Vaginitis     Vulvovaginitis      Past Surgical History:   Procedure Laterality Date    EYE SURGERY      cataract    TUBAL LIGATION        Family History:     Family History   Problem Relation Age of Onset    Colon cancer Maternal Aunt     Stroke Mother       Social History:     Social History     Socioeconomic History    Marital status: /Civil Union     Spouse name: None    Number of children: None    Years of education: None    Highest education level: None   Occupational History    None   Tobacco Use    Smoking status: Never Smoker    Smokeless tobacco: Never Used   Vaping Use    Vaping Use: Never used   Substance and Sexual Activity    Alcohol use: Yes     Comment: social     Drug use: No    Sexual activity: None   Other Topics Concern    None   Social History Narrative    None     Social Determinants of Health     Financial Resource Strain:     Difficulty of Paying Living Expenses:    Food Insecurity:     Worried About Running Out of Food in the Last Year:     Ran Out of Food in the Last Year:    Transportation Needs:     Lack of Transportation (Medical):  Lack of Transportation (Non-Medical):    Physical Activity:     Days of Exercise per Week:     Minutes of Exercise per Session:    Stress:     Feeling of Stress :    Social Connections:     Frequency of Communication with Friends and Family:     Frequency of Social Gatherings with Friends and Family:     Attends Orthodox Services:     Active Member of Clubs or Organizations:     Attends Club or Organization Meetings:     Marital Status:    Intimate Partner Violence:     Fear of Current or Ex-Partner:     Emotionally Abused:     Physically Abused:     Sexually Abused:       Medications and Allergies:     Current Outpatient Medications   Medication Sig Dispense Refill    albuterol (Ventolin HFA) 90 mcg/act inhaler Inhale 2 puffs every 6 (six) hours as needed for wheezing 18 g 6    aspirin 81 mg chewable tablet Chew 1 tablet daily      B COMPLEX VITAMINS PO Take by mouth      Naproxen Sodium (ALEVE) 220 MG CAPS Take 1 capsule by mouth daily        nystatin-triamcinolone (MYCOLOG-II) cream Apply topically as needed         No current facility-administered medications for this visit       Allergies   Allergen Reactions    Penicillins     Sulfa Antibiotics       Immunizations:     Immunization History   Administered Date(s) Administered    Pneumococcal Conjugate 13-Valent 02/25/2019    Pneumococcal Polysaccharide PPV23 10/01/2006    SARS-CoV-2 / COVID-19 mRNA IM (Pfizer-BioNTech) 01/18/2021, 02/06/2021      Health Maintenance:         Topic Date Due    Colorectal Cancer Screening  05/15/2024         Topic Date Due    DTaP,Tdap,and Td Vaccines (1 - Tdap) Never done    Influenza Vaccine (1) 09/01/2021      Medicare Health Risk Assessment:     /70   Pulse 71   Temp 97 5 °F (36 4 °C)   Resp 16   Ht 5' 1" (1 549 m)   Wt 90 9 kg (200 lb 6 4 oz)   BMI 37 87 kg/m²      Lisset December is here for her Subsequent Wellness visit  Health Risk Assessment:   Patient rates overall health as very good  Patient feels that their physical health rating is slightly worse  Patient is very satisfied with their life  Eyesight was rated as same  Hearing was rated as same  Patient feels that their emotional and mental health rating is same  Patients states they are sometimes angry  Patient states they are often unusually tired/fatigued  Pain experienced in the last 7 days has been none  Patient states that she has experienced no weight loss or gain in last 6 months  Depression Screening:   PHQ-2 Score: 0      Fall Risk Screening: In the past year, patient has experienced: no history of falling in past year      Urinary Incontinence Screening:   Patient has leaked urine accidently in the last six months  Home Safety:  Patient does not have trouble with stairs inside or outside of their home  Patient has working smoke alarms and has working carbon monoxide detector  Home safety hazards include: none  Nutrition:   Current diet is Regular  Medications:   Patient is not currently taking any over-the-counter supplements  Patient is able to manage medications       Activities of Daily Living (ADLs)/Instrumental Activities of Daily Living (IADLs):   Walk and transfer into and out of bed and chair?: Yes  Dress and groom yourself?: Yes    Bathe or shower yourself?: Yes    Feed yourself? Yes  Do your laundry/housekeeping?: Yes  Manage your money, pay your bills and track your expenses?: Yes  Make your own meals?: Yes    Do your own shopping?: Yes    Previous Hospitalizations:   Any hospitalizations or ED visits within the last 12 months?: No      Advance Care Planning:   Living will: Yes    Durable POA for healthcare: Yes    Advanced directive: Yes    End of Life Decisions reviewed with patient: Yes      Cognitive Screening:   Provider or family/friend/caregiver concerned regarding cognition?: No    PREVENTIVE SCREENINGS      Cardiovascular Screening:    General: Screening Current and Risks and Benefits Discussed    Due for: Lipid Panel      Diabetes Screening:     General: Risks and Benefits Discussed    Due for: Blood Glucose      Colorectal Cancer Screening:     General: Screening Current      Breast Cancer Screening:     General: Screening Current      Cervical Cancer Screening:    General: Screening Not Indicated      Osteoporosis Screening:    General: Screening Current      Abdominal Aortic Aneurysm (AAA) Screening:        General: Screening Not Indicated      Lung Cancer Screening:     General: Screening Not Indicated      Hepatitis C Screening:    General: Screening Not Indicated    Screening, Brief Intervention, and Referral to Treatment (SBIRT)    Screening  Typical number of drinks in a day: 0  Typical number of drinks in a week: 0  Interpretation: Low risk drinking behavior  Single Item Drug Screening:  How often have you used an illegal drug (including marijuana) or a prescription medication for non-medical reasons in the past year? never    Single Item Drug Screen Score: 0  Interpretation: Negative screen for possible drug use disorder    Brief Intervention  Alcohol & drug use screenings were reviewed   No concerns regarding substance use disorder identified  Other Counseling Topics:   Car/seat belt/driving safety, skin self-exam, sunscreen and calcium and vitamin D intake and regular weightbearing exercise  Physical Exam  Constitutional:       General: She is not in acute distress  Appearance: She is well-developed  HENT:      Head: Normocephalic and atraumatic  Right Ear: External ear normal       Left Ear: External ear normal       Nose: Nose normal    Eyes:      Conjunctiva/sclera: Conjunctivae normal       Pupils: Pupils are equal, round, and reactive to light  Neck:      Thyroid: No thyromegaly  Vascular: No JVD  Cardiovascular:      Rate and Rhythm: Normal rate and regular rhythm  Pulses: Intact distal pulses  Heart sounds: Normal heart sounds  No murmur heard  No friction rub  No gallop  Pulmonary:      Effort: Pulmonary effort is normal       Breath sounds: Normal breath sounds  No wheezing or rales  Abdominal:      General: Bowel sounds are normal  There is no distension  Palpations: Abdomen is soft  Tenderness: There is no abdominal tenderness  Musculoskeletal:         General: Normal range of motion  Cervical back: Normal range of motion and neck supple  Skin:     General: Skin is warm and dry  Findings: No rash  Neurological:      Mental Status: She is alert and oriented to person, place, and time  Deep Tendon Reflexes: Reflexes are normal and symmetric  Psychiatric:         Behavior: Behavior normal          Thought Content: Thought content normal          Judgment: Judgment normal      BMI Counseling: Body mass index is 37 87 kg/m²  The BMI is above normal  Nutrition recommendations include reducing portion sizes, decreasing overall calorie intake and 3-5 servings of fruits/vegetables daily  Exercise recommendations include exercising 3-5 times per week        Mary Hoover MD Elective surgery  (Colon resection, 10 years ago)  History of colonoscopy    S/P arthroscopic knee surgery  (Right knee)  S/P endoscopy Elective surgery  (Colectomy, 10 years ago)  History of colonoscopy    S/P arthroscopic knee surgery  (Right knee)  S/P endoscopy  (5/23/18)

## 2021-09-09 NOTE — PATIENT INSTRUCTIONS
Medicare Preventive Visit Patient Instructions  Thank you for completing your Welcome to Medicare Visit or Medicare Annual Wellness Visit today  Your next wellness visit will be due in one year (9/10/2022)  The screening/preventive services that you may require over the next 5-10 years are detailed below  Some tests may not apply to you based off risk factors and/or age  Screening tests ordered at today's visit but not completed yet may show as past due  Also, please note that scanned in results may not display below  Preventive Screenings:  Service Recommendations Previous Testing/Comments   Colorectal Cancer Screening  * Colonoscopy    * Fecal Occult Blood Test (FOBT)/Fecal Immunochemical Test (FIT)  * Fecal DNA/Cologuard Test  * Flexible Sigmoidoscopy Age: 54-65 years old   Colonoscopy: every 10 years (may be performed more frequently if at higher risk)  OR  FOBT/FIT: every 1 year  OR  Cologuard: every 3 years  OR  Sigmoidoscopy: every 5 years  Screening may be recommended earlier than age 48 if at higher risk for colorectal cancer  Also, an individualized decision between you and your healthcare provider will decide whether screening between the ages of 74-80 would be appropriate  Colonoscopy: 05/15/2019  FOBT/FIT: Not on file  Cologuard: Not on file  Sigmoidoscopy: Not on file    Screening Current     Breast Cancer Screening Age: 36 years old  Frequency: every 1-2 years  Not required if history of left and right mastectomy Mammogram: 11/22/2019    Screening Current   Cervical Cancer Screening Between the ages of 21-29, pap smear recommended once every 3 years  Between the ages of 33-67, can perform pap smear with HPV co-testing every 5 years     Recommendations may differ for women with a history of total hysterectomy, cervical cancer, or abnormal pap smears in past  Pap Smear: Not on file    Screening Not Indicated   Hepatitis C Screening Once for adults born between 1945 and 1965  More frequently in patients at high risk for Hepatitis C Hep C Antibody: Not on file        Diabetes Screening 1-2 times per year if you're at risk for diabetes or have pre-diabetes Fasting glucose: 87 mg/dL   A1C: No results in last 5 years        Cholesterol Screening Once every 5 years if you don't have a lipid disorder  May order more often based on risk factors  Lipid panel: 07/22/2019    Screening Current     Other Preventive Screenings Covered by Medicare:  1  Abdominal Aortic Aneurysm (AAA) Screening: covered once if your at risk  You're considered to be at risk if you have a family history of AAA  2  Lung Cancer Screening: covers low dose CT scan once per year if you meet all of the following conditions: (1) Age 50-69; (2) No signs or symptoms of lung cancer; (3) Current smoker or have quit smoking within the last 15 years; (4) You have a tobacco smoking history of at least 30 pack years (packs per day multiplied by number of years you smoked); (5) You get a written order from a healthcare provider  3  Glaucoma Screening: covered annually if you're considered high risk: (1) You have diabetes OR (2) Family history of glaucoma OR (3)  aged 48 and older OR (3)  American aged 72 and older  3  Osteoporosis Screening: covered every 2 years if you meet one of the following conditions: (1) You're estrogen deficient and at risk for osteoporosis based off medical history and other findings; (2) Have a vertebral abnormality; (3) On glucocorticoid therapy for more than 3 months; (4) Have primary hyperparathyroidism; (5) On osteoporosis medications and need to assess response to drug therapy  · Last bone density test (DXA Scan): 08/20/2020   5  HIV Screening: covered annually if you're between the age of 15-65  Also covered annually if you are younger than 13 and older than 72 with risk factors for HIV infection  For pregnant patients, it is covered up to 3 times per pregnancy      Immunizations:  Immunization Recommendations   Influenza Vaccine Annual influenza vaccination during flu season is recommended for all persons aged >= 6 months who do not have contraindications   Pneumococcal Vaccine (Prevnar and Pneumovax)  * Prevnar = PCV13  * Pneumovax = PPSV23   Adults 25-60 years old: 1-3 doses may be recommended based on certain risk factors  Adults 72 years old: Prevnar (PCV13) vaccine recommended followed by Pneumovax (PPSV23) vaccine  If already received PPSV23 since turning 65, then PCV13 recommended at least one year after PPSV23 dose  Hepatitis B Vaccine 3 dose series if at intermediate or high risk (ex: diabetes, end stage renal disease, liver disease)   Tetanus (Td) Vaccine - COST NOT COVERED BY MEDICARE PART B Following completion of primary series, a booster dose should be given every 10 years to maintain immunity against tetanus  Td may also be given as tetanus wound prophylaxis  Tdap Vaccine - COST NOT COVERED BY MEDICARE PART B Recommended at least once for all adults  For pregnant patients, recommended with each pregnancy  Shingles Vaccine (Shingrix) - COST NOT COVERED BY MEDICARE PART B  2 shot series recommended in those aged 48 and above     Health Maintenance Due:      Topic Date Due    Colorectal Cancer Screening  05/15/2024     Immunizations Due:      Topic Date Due    DTaP,Tdap,and Td Vaccines (1 - Tdap) Never done    Influenza Vaccine (1) 09/01/2021     Advance Directives   What are advance directives? Advance directives are legal documents that state your wishes and plans for medical care  These plans are made ahead of time in case you lose your ability to make decisions for yourself  Advance directives can apply to any medical decision, such as the treatments you want, and if you want to donate organs  What are the types of advance directives? There are many types of advance directives, and each state has rules about how to use them   You may choose a combination of any of the following:  · Living will: This is a written record of the treatment you want  You can also choose which treatments you do not want, which to limit, and which to stop at a certain time  This includes surgery, medicine, IV fluid, and tube feedings  · Durable power of  for healthcare [facility]): This is a written record that states who you want to make healthcare choices for you when you are unable to make them for yourself  This person, called a proxy, is usually a family member or a friend  You may choose more than 1 proxy  · Do not resuscitate (DNR) order:  A DNR order is used in case your heart stops beating or you stop breathing  It is a request not to have certain forms of treatment, such as CPR  A DNR order may be included in other types of advance directives  · Medical directive: This covers the care that you want if you are in a coma, near death, or unable to make decisions for yourself  You can list the treatments you want for each condition  Treatment may include pain medicine, surgery, blood transfusions, dialysis, IV or tube feedings, and a ventilator (breathing machine)  · Values history: This document has questions about your views, beliefs, and how you feel and think about life  This information can help others choose the care that you would choose  Why are advance directives important? An advance directive helps you control your care  Although spoken wishes may be used, it is better to have your wishes written down  Spoken wishes can be misunderstood, or not followed  Treatments may be given even if you do not want them  An advance directive may make it easier for your family to make difficult choices about your care  Urinary Incontinence   Urinary incontinence (UI)  is when you lose control of your bladder  UI develops because your bladder cannot store or empty urine properly  The 3 most common types of UI are stress incontinence, urge incontinence, or both    Medicines:   · May be given to help strengthen your bladder control  Report any side effects of medication to your healthcare provider  Do pelvic muscle exercises often:  Your pelvic muscles help you stop urinating  Squeeze these muscles tight for 5 seconds, then relax for 5 seconds  Gradually work up to squeezing for 10 seconds  Do 3 sets of 15 repetitions a day, or as directed  This will help strengthen your pelvic muscles and improve bladder control  Train your bladder:  Go to the bathroom at set times, such as every 2 hours, even if you do not feel the urge to go  You can also try to hold your urine when you feel the urge to go  For example, hold your urine for 5 minutes when you feel the urge to go  As that becomes easier, hold your urine for 10 minutes  Self-care:   · Keep a UI record  Write down how often you leak urine and how much you leak  Make a note of what you were doing when you leaked urine  · Drink liquids as directed  You may need to limit the amount of liquid you drink to help control your urine leakage  Do not drink any liquid right before you go to bed  Limit or do not have drinks that contain caffeine or alcohol  · Prevent constipation  Eat a variety of high-fiber foods  Good examples are high-fiber cereals, beans, vegetables, and whole-grain breads  Walking is the best way to trigger your intestines to have a bowel movement  · Exercise regularly and maintain a healthy weight  Weight loss and exercise will decrease pressure on your bladder and help you control your leakage  · Use a catheter as directed  to help empty your bladder  A catheter is a tiny, plastic tube that is put into your bladder to drain your urine  · Go to behavior therapy as directed  Behavior therapy may be used to help you learn to control your urge to urinate      Weight Management   Why it is important to manage your weight:  Being overweight increases your risk of health conditions such as heart disease, high blood pressure, type 2 diabetes, and certain types of cancer  It can also increase your risk for osteoarthritis, sleep apnea, and other respiratory problems  Aim for a slow, steady weight loss  Even a small amount of weight loss can lower your risk of health problems  How to lose weight safely:  A safe and healthy way to lose weight is to eat fewer calories and get regular exercise  You can lose up about 1 pound a week by decreasing the number of calories you eat by 500 calories each day  Healthy meal plan for weight management:  A healthy meal plan includes a variety of foods, contains fewer calories, and helps you stay healthy  A healthy meal plan includes the following:  · Eat whole-grain foods more often  A healthy meal plan should contain fiber  Fiber is the part of grains, fruits, and vegetables that is not broken down by your body  Whole-grain foods are healthy and provide extra fiber in your diet  Some examples of whole-grain foods are whole-wheat breads and pastas, oatmeal, brown rice, and bulgur  · Eat a variety of vegetables every day  Include dark, leafy greens such as spinach, kale, kofi greens, and mustard greens  Eat yellow and orange vegetables such as carrots, sweet potatoes, and winter squash  · Eat a variety of fruits every day  Choose fresh or canned fruit (canned in its own juice or light syrup) instead of juice  Fruit juice has very little or no fiber  · Eat low-fat dairy foods  Drink fat-free (skim) milk or 1% milk  Eat fat-free yogurt and low-fat cottage cheese  Try low-fat cheeses such as mozzarella and other reduced-fat cheeses  · Choose meat and other protein foods that are low in fat  Choose beans or other legumes such as split peas or lentils  Choose fish, skinless poultry (chicken or turkey), or lean cuts of red meat (beef or pork)  Before you cook meat or poultry, cut off any visible fat  · Use less fat and oil  Try baking foods instead of frying them   Add less fat, such as margarine, sour cream, regular salad dressing and mayonnaise to foods  Eat fewer high-fat foods  Some examples of high-fat foods include french fries, doughnuts, ice cream, and cakes  · Eat fewer sweets  Limit foods and drinks that are high in sugar  This includes candy, cookies, regular soda, and sweetened drinks  Exercise:  Exercise at least 30 minutes per day on most days of the week  Some examples of exercise include walking, biking, dancing, and swimming  You can also fit in more physical activity by taking the stairs instead of the elevator or parking farther away from stores  Ask your healthcare provider about the best exercise plan for you  © Copyright [employer] 2018 Information is for End User's use only and may not be sold, redistributed or otherwise used for commercial purposes   All illustrations and images included in CareNotes® are the copyrighted property of A D A M [employer]  or [address] [place] [address]

## 2021-10-04 ENCOUNTER — APPOINTMENT (OUTPATIENT)
Dept: LAB | Facility: HOSPITAL | Age: 81
End: 2021-10-04
Attending: INTERNAL MEDICINE
Payer: MEDICARE

## 2021-10-04 DIAGNOSIS — Z00.00 MEDICARE ANNUAL WELLNESS VISIT, SUBSEQUENT: ICD-10-CM

## 2021-10-04 LAB
ALBUMIN SERPL BCP-MCNC: 3.4 G/DL (ref 3.5–5)
ALP SERPL-CCNC: 65 U/L (ref 46–116)
ALT SERPL W P-5'-P-CCNC: 21 U/L (ref 12–78)
ANION GAP SERPL CALCULATED.3IONS-SCNC: 8 MMOL/L (ref 4–13)
AST SERPL W P-5'-P-CCNC: 18 U/L (ref 5–45)
BASOPHILS # BLD AUTO: 0.09 THOUSANDS/ΜL (ref 0–0.1)
BASOPHILS NFR BLD AUTO: 2 % (ref 0–1)
BILIRUB SERPL-MCNC: 0.63 MG/DL (ref 0.2–1)
BUN SERPL-MCNC: 15 MG/DL (ref 5–25)
CALCIUM ALBUM COR SERPL-MCNC: 8.8 MG/DL (ref 8.3–10.1)
CALCIUM SERPL-MCNC: 8.3 MG/DL (ref 8.3–10.1)
CHLORIDE SERPL-SCNC: 105 MMOL/L (ref 100–108)
CHOLEST SERPL-MCNC: 178 MG/DL (ref 50–200)
CO2 SERPL-SCNC: 28 MMOL/L (ref 21–32)
CREAT SERPL-MCNC: 0.84 MG/DL (ref 0.6–1.3)
EOSINOPHIL # BLD AUTO: 0.15 THOUSAND/ΜL (ref 0–0.61)
EOSINOPHIL NFR BLD AUTO: 3 % (ref 0–6)
ERYTHROCYTE [DISTWIDTH] IN BLOOD BY AUTOMATED COUNT: 16.4 % (ref 11.6–15.1)
GFR SERPL CREATININE-BSD FRML MDRD: 66 ML/MIN/1.73SQ M
GLUCOSE P FAST SERPL-MCNC: 92 MG/DL (ref 65–99)
HCT VFR BLD AUTO: 41.6 % (ref 34.8–46.1)
HDLC SERPL-MCNC: 60 MG/DL
HGB BLD-MCNC: 12.3 G/DL (ref 11.5–15.4)
IMM GRANULOCYTES # BLD AUTO: 0.01 THOUSAND/UL (ref 0–0.2)
IMM GRANULOCYTES NFR BLD AUTO: 0 % (ref 0–2)
LDLC SERPL CALC-MCNC: 101 MG/DL (ref 0–100)
LYMPHOCYTES # BLD AUTO: 1.79 THOUSANDS/ΜL (ref 0.6–4.47)
LYMPHOCYTES NFR BLD AUTO: 36 % (ref 14–44)
MCH RBC QN AUTO: 20.7 PG (ref 26.8–34.3)
MCHC RBC AUTO-ENTMCNC: 29.6 G/DL (ref 31.4–37.4)
MCV RBC AUTO: 70 FL (ref 82–98)
MONOCYTES # BLD AUTO: 0.38 THOUSAND/ΜL (ref 0.17–1.22)
MONOCYTES NFR BLD AUTO: 8 % (ref 4–12)
NEUTROPHILS # BLD AUTO: 2.51 THOUSANDS/ΜL (ref 1.85–7.62)
NEUTS SEG NFR BLD AUTO: 51 % (ref 43–75)
NONHDLC SERPL-MCNC: 118 MG/DL
NRBC BLD AUTO-RTO: 0 /100 WBCS
PLATELET # BLD AUTO: 254 THOUSANDS/UL (ref 149–390)
PMV BLD AUTO: 10.3 FL (ref 8.9–12.7)
POTASSIUM SERPL-SCNC: 4.5 MMOL/L (ref 3.5–5.3)
PROT SERPL-MCNC: 7.4 G/DL (ref 6.4–8.2)
RBC # BLD AUTO: 5.95 MILLION/UL (ref 3.81–5.12)
SODIUM SERPL-SCNC: 141 MMOL/L (ref 136–145)
TRIGL SERPL-MCNC: 86 MG/DL
WBC # BLD AUTO: 4.93 THOUSAND/UL (ref 4.31–10.16)

## 2021-10-04 PROCEDURE — 36415 COLL VENOUS BLD VENIPUNCTURE: CPT

## 2021-10-04 PROCEDURE — 80053 COMPREHEN METABOLIC PANEL: CPT

## 2021-10-04 PROCEDURE — 80061 LIPID PANEL: CPT

## 2021-10-04 PROCEDURE — 85025 COMPLETE CBC W/AUTO DIFF WBC: CPT

## 2021-10-09 ENCOUNTER — IMMUNIZATIONS (OUTPATIENT)
Dept: FAMILY MEDICINE CLINIC | Facility: HOSPITAL | Age: 81
End: 2021-10-09

## 2021-10-09 DIAGNOSIS — Z23 ENCOUNTER FOR IMMUNIZATION: Primary | ICD-10-CM

## 2021-10-09 PROCEDURE — 91300 SARS-COV-2 / COVID-19 MRNA VACCINE (PFIZER-BIONTECH) 30 MCG: CPT

## 2021-10-09 PROCEDURE — 0001A SARS-COV-2 / COVID-19 MRNA VACCINE (PFIZER-BIONTECH) 30 MCG: CPT

## 2022-01-10 ENCOUNTER — OFFICE VISIT (OUTPATIENT)
Dept: AUDIOLOGY | Facility: CLINIC | Age: 82
End: 2022-01-10
Payer: MEDICARE

## 2022-01-10 ENCOUNTER — OFFICE VISIT (OUTPATIENT)
Dept: OTOLARYNGOLOGY | Facility: CLINIC | Age: 82
End: 2022-01-10
Payer: MEDICARE

## 2022-01-10 VITALS — WEIGHT: 200 LBS | TEMPERATURE: 97.8 F | BODY MASS INDEX: 37.76 KG/M2 | HEIGHT: 61 IN

## 2022-01-10 DIAGNOSIS — H69.83 ETD (EUSTACHIAN TUBE DYSFUNCTION), BILATERAL: ICD-10-CM

## 2022-01-10 DIAGNOSIS — H90.3 SENSORINEURAL HEARING LOSS (SNHL), BILATERAL: ICD-10-CM

## 2022-01-10 DIAGNOSIS — R26.89 IMBALANCE: Primary | ICD-10-CM

## 2022-01-10 DIAGNOSIS — E55.9 VITAMIN D DEFICIENCY: ICD-10-CM

## 2022-01-10 DIAGNOSIS — H61.21 RIGHT EAR IMPACTED CERUMEN: ICD-10-CM

## 2022-01-10 DIAGNOSIS — H90.3 SENSORY HEARING LOSS, BILATERAL: Primary | ICD-10-CM

## 2022-01-10 DIAGNOSIS — E07.89 THYROID FULLNESS: ICD-10-CM

## 2022-01-10 DIAGNOSIS — R49.0 HOARSENESS OF VOICE: ICD-10-CM

## 2022-01-10 PROBLEM — H69.93 ETD (EUSTACHIAN TUBE DYSFUNCTION), BILATERAL: Status: ACTIVE | Noted: 2022-01-10

## 2022-01-10 PROCEDURE — 92567 TYMPANOMETRY: CPT | Performed by: AUDIOLOGIST

## 2022-01-10 PROCEDURE — 92557 COMPREHENSIVE HEARING TEST: CPT | Performed by: AUDIOLOGIST

## 2022-01-10 PROCEDURE — 99204 OFFICE O/P NEW MOD 45 MIN: CPT | Performed by: NURSE PRACTITIONER

## 2022-01-10 RX ORDER — ZINC GLUCONATE 50 MG
50 TABLET ORAL DAILY
COMMUNITY

## 2022-01-10 RX ORDER — FLUTICASONE PROPIONATE 50 MCG
2 SPRAY, SUSPENSION (ML) NASAL DAILY
Qty: 1 G | Refills: 6 | Status: SHIPPED | OUTPATIENT
Start: 2022-01-10

## 2022-01-10 NOTE — ASSESSMENT & PLAN NOTE
Discussed sore throat and hoarseness for some time  Treat from nasal aspect    If no change symptoms consider other options

## 2022-01-10 NOTE — ASSESSMENT & PLAN NOTE
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, type A tymps with good word discrimination  May consider hearing amplification  Discussed possible causes of vertigo including neurology, cardiac, autoimmune, Otitis media, sinusitis, ETD, and inner ear concerns  Regarding vertigo on exam noted [employer] negative, Romberg negative, tandem walking with slight correction  Treatment options include at home epley's, lab studies, vestibular therapy,  VNG testing, MRI brain with IAC, nasal steroids, saline rinses  After discussion agreed to PT, Fluticasone nasal spray 2 sprays daily, Saline rinses daily    Given scripts for labs  Follow up in 6 to 8 weeks to monitor, consider VNG or Mri at that time

## 2022-01-10 NOTE — PROGRESS NOTES
Assessment/Plan:    Imbalance  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, type A tymps with good word discrimination  May consider hearing amplification  Discussed possible causes of vertigo including neurology, cardiac, autoimmune, Otitis media, sinusitis, ETD, and inner ear concerns  Regarding vertigo on exam noted [employer] negative, Romberg negative, tandem walking with slight correction  Treatment options include at home epley's, lab studies, vestibular therapy,  VNG testing, MRI brain with IAC, nasal steroids, saline rinses  After discussion agreed to PT, Fluticasone nasal spray 2 sprays daily, Saline rinses daily  Given scripts for labs  Follow up in 6 to 8 weeks to monitor, consider VNG or Mri at that time          Hoarseness of voice  Discussed sore throat and hoarseness for some time  Treat from nasal aspect  If no change symptoms consider other options         Diagnoses and all orders for this visit:    Imbalance  -     Cancel: Ambulatory referral to Audiology  -     Ambulatory referral to Audiology  -     Ambulatory referral to Physical Therapy; Future  -     ANDRÉS Screen w/ Reflex to Titer/Pattern; Future  -     RF Screen w/ Reflex to Titer; Future  -     C-reactive protein; Future  -     Sedimentation rate, automated; Future  -     TSH, 3rd generation with Free T4 reflex; Future  -     Vitamin D 25 hydroxy; Future  -     T4; Future  -     Comprehensive metabolic panel;  Future  -     CBC and differential; Future    ETD (Eustachian tube dysfunction), bilateral  -     fluticasone (FLONASE) 50 mcg/act nasal spray; 2 sprays into each nostril daily  -     ANDRÉS Screen w/ Reflex to Titer/Pattern; Future  -     RF Screen w/ Reflex to Titer; Future  -     C-reactive protein; Future  -     Sedimentation rate, automated; Future  -     TSH, 3rd generation with Free T4 reflex; Future  -     Vitamin D 25 hydroxy; Future  -     T4; Future  -     Comprehensive metabolic panel; Future  -     CBC and differential; Future    Hoarseness of voice  -     ANDRÉS Screen w/ Reflex to Titer/Pattern; Future  -     RF Screen w/ Reflex to Titer; Future  -     C-reactive protein; Future  -     Sedimentation rate, automated; Future  -     TSH, 3rd generation with Free T4 reflex; Future  -     Vitamin D 25 hydroxy; Future  -     T4; Future  -     Comprehensive metabolic panel; Future  -     CBC and differential; Future    Sensorineural hearing loss (SNHL), bilateral  -     ANDRÉS Screen w/ Reflex to Titer/Pattern; Future  -     RF Screen w/ Reflex to Titer; Future  -     C-reactive protein; Future  -     Sedimentation rate, automated; Future  -     TSH, 3rd generation with Free T4 reflex; Future  -     Vitamin D 25 hydroxy; Future  -     T4; Future  -     Comprehensive metabolic panel; Future  -     CBC and differential; Future    Thyroid fullness  -     ANDRÉS Screen w/ Reflex to Titer/Pattern; Future  -     RF Screen w/ Reflex to Titer; Future  -     C-reactive protein; Future  -     Sedimentation rate, automated; Future  -     TSH, 3rd generation with Free T4 reflex; Future  -     Vitamin D 25 hydroxy; Future  -     T4; Future  -     Comprehensive metabolic panel; Future  -     CBC and differential; Future    Vitamin D deficiency  -     ANDRÉS Screen w/ Reflex to Titer/Pattern; Future  -     RF Screen w/ Reflex to Titer; Future  -     C-reactive protein; Future  -     Sedimentation rate, automated; Future  -     TSH, 3rd generation with Free T4 reflex; Future  -     Vitamin D 25 hydroxy; Future  -     T4; Future  -     Comprehensive metabolic panel;  Future  -     CBC and differential; Future    Right ear impacted cerumen  - Ear cerumen removal    Other orders  -     zinc gluconate 50 mg tablet; Take 50 mg by mouth daily          Subjective:      Patient ID: Hayder Bryan is a [de-identified] y o  female  Presents today as a new patient due to dizziness and imbalance  Symptoms began near end of summer  Saw PCP at the time and was recommended to have PT but did not pursue  Swaying sensation  Occurs at times while lying in bed and rolls over  No falls  Headaches more recently when gets dizzy episode  Concerned about sinuses causing dizziness  Left ear concerns, felt bug go inside left ear, last spring and sine then ear does not feel right  If gets sore throat feels lump inside throat  Diagnosed with sleep apnea, prior care with pulmonary  Sleeps sitting up due to snoring  The following portions of the patient's history were reviewed and updated as appropriate: allergies, current medications, past family history, past medical history, past social history, past surgical history and problem list     Review of Systems   Constitutional: Negative  HENT: Positive for hearing loss  Negative for congestion, ear discharge, ear pain, nosebleeds, postnasal drip, rhinorrhea, sinus pressure, sinus pain, sore throat, tinnitus and voice change  Eyes: Negative  Respiratory: Negative for chest tightness and shortness of breath  Cardiovascular: Negative  Gastrointestinal: Negative  Endocrine: Negative  Musculoskeletal: Negative  Skin: Negative for color change  Neurological: Positive for dizziness and headaches  Negative for numbness  Psychiatric/Behavioral: Negative  Objective:      Temp 97 8 °F (36 6 °C) (Temporal)   Ht 5' 1" (1 549 m)   Wt 90 7 kg (200 lb)   BMI 37 79 kg/m²          Physical Exam  Constitutional:       Appearance: She is well-developed  HENT:      Head: Normocephalic  Right Ear: Hearing, tympanic membrane, ear canal and external ear normal  No decreased hearing noted   No drainage or tenderness  There is impacted cerumen  Tympanic membrane is not perforated or erythematous  Left Ear: Hearing, tympanic membrane, ear canal and external ear normal  No decreased hearing noted  No drainage or tenderness  Tympanic membrane is not perforated or erythematous  Nose: Nose normal  No nasal deformity or septal deviation  Mouth/Throat:      Mouth: Mucous membranes are not pale and not dry  No oral lesions  Dentition: Normal dentition  Pharynx: Uvula midline  No oropharyngeal exudate  Neck:      Trachea: No tracheal deviation  Cardiovascular:      Rate and Rhythm: Normal rate  Pulmonary:      Effort: Pulmonary effort is normal  No accessory muscle usage or respiratory distress  Musculoskeletal:      Right shoulder: Normal range of motion  Cervical back: Full passive range of motion without pain, normal range of motion and neck supple  Lymphadenopathy:      Cervical: No cervical adenopathy  Skin:     General: Skin is warm and dry  Neurological:      Mental Status: She is alert and oriented to person, place, and time  Cranial Nerves: No cranial nerve deficit  Sensory: No sensory deficit  Psychiatric:         Behavior: Behavior is cooperative  Ear cerumen removal    Date/Time: 1/10/2022 12:40 PM  Performed by: Josph Cooks, CRNP  Authorized by: Josph Cooks, CRNP   Universal Protocol:  Consent: Verbal consent obtained  Risks and benefits: risks, benefits and alternatives were discussed  Consent given by: patient  Patient understanding: patient states understanding of the procedure being performed      Patient location:  Clinic  Procedure details:     Local anesthetic:  None    Location:  R ear    Approach:  External  Post-procedure details:     Complication:  None    Hearing quality:  Normal    Patient tolerance of procedure:   Tolerated well, no immediate complications  Comments:      Right cerumen impaction removed with alligator forceps

## 2022-01-10 NOTE — PROGRESS NOTES
ENT HEARING EVALUATION    Name:  Venu Adkins  :  1940  Age:  [de-identified] y o  Date of Evaluation: 01/10/22     History: ENT Audiogram / Dizziness, Tinnitus, hearing loss   Reason for visit: Venu Adkins is being seen today at the request of Ms Shashank MONGE for an evaluation of hearing as part of their initial ENT visit  EVALUATION:    Otoscopic Evaluation:   Right Ear: cleared today by Germaine Aldana    Left Ear: clear canal     Tympanometry:   Right: Type A - normal middle ear pressure and compliance   Left: Type A - normal middle ear pressure and compliance    3    Audiogram Results:  Mild sloping to moderate SHL bilaterally  *see attached audiogram for details     RECOMMENDATIONS:  1  Follow up per MARIPOSA Carter   2  Consider Hearing aid Consultation should communication become an issue on a daily basis      3  Annual audiological evaluations for monitoring purposes      Saida De León , CCC-A, [place]# [id number], Hearing Aid Dispenser, [place]# [id number]  Clinical Audiologist

## 2022-01-20 ENCOUNTER — EVALUATION (OUTPATIENT)
Dept: PHYSICAL THERAPY | Facility: CLINIC | Age: 82
End: 2022-01-20
Payer: MEDICARE

## 2022-01-20 DIAGNOSIS — R26.89 IMBALANCE: Primary | ICD-10-CM

## 2022-01-20 PROCEDURE — 97162 PT EVAL MOD COMPLEX 30 MIN: CPT

## 2022-01-20 NOTE — PROGRESS NOTES
PT Evaluation          Insurance:  A/CMS Eval/ Re-eval POC expires An Byers #/ Referral # Total   Visits  Start date  Expiration date Extension  Visit limitation? PT only or  PT+OT? Co-Insurance   Medicare: CMS 22  Not required NA NA NA NA NA PT No                                                                    Today's date: 2022  Patient name: Venu Adkins  : 1940  MRN: [id number]  Referring provider: MARIPOSA Armstrong  Dx:   Encounter Diagnosis     ICD-10-CM    1  Imbalance  R26 89 Ambulatory referral to Physical Therapy         Assessment  Assessment details: Patient is a [de-identified] y o  Female who presents to skilled outpatient PT with onset of dizziness and feelings of imbalance since summer 2021  Patient notes that her symptoms are not predictable in timing or situation but feel like "swaying" or "I might lose my balance " Patient denies overt LOB since onset of symptoms but is worried about role caring for  who has some health problems, as she has been unable to help him catch his balance recently  Patient presents with grossly 4 to 4+/5 LE strength and intact sensation, though patient endorses history of numbness/tingling in BLE  Pt cleared neuro screen as well as cervical spine ROM, ligamentous, and vertebral artery testing  Coordination is normal as well  Oculomotor function was normal per VOMS; however able to elicit reproduction of symptoms with saccades, VOR cx, and head thrust test  In addition, noted (+) TTP in L upper trap and L levator scap  Patient able to complete 30 seconds of only first condition of mCTSIB with difficulty maintaining static stance on compliant surfaces and with eyes closed  Educated patient regarding 3 components of balance - visual, vestibular, and somatosensory - with likely contribution from hypofunction of all domains to feelings of dizziness and imbalance   Patient will benefit from skilled physical therapy to adapt vestibular system, further assess dynamic balance, improve balance, and promote restoration to PLOF  Impairments: Impaired balance, Impaired physical strength, Lacks appropriate HEP and Safety issue  Understanding of Dx/Px/POC: Good  Prognosis: Good    Patient verbalized understanding of POC  Please contact me if you have any questions or recommendations  Thank you for the referral and the opportunity to share in Woody Anderson's care          Plan  Plan details: Skilled PT, FGA, DGI, 6MWT, 10mWT, 5xSTS    Patient would benefit from: Skilled PT  Planned modality interventions: Biofeedback  Planned therapy interventions: Balance, Functional ROM exercises, Gait training, HEP, Manual therapy, Motor coordination training, Neuromuscular re-education, Patient education, Strengthening, Stretching, Therapeutic activities and Therapeutic exercises  Frequency: 2x/wk  Duration in weeks: 12  Plan of Care beginning date: 1/20/22  Plan of Care expiration date: 12 weeks - 4/14/22  Treatment plan discussed with: Patient       Goals  Short Term Goals (4 weeks):    - Patient will be independent in basic HEP 2-3 weeks  - Patient will improve 5xSTS score by 2 3 seconds from initial score to promote improved LE functional strength needed for ADLs  - Patient will complete FGA and DGI to assess dynamic balance  - Patient will perform 6MWT to assess endurance  - Patient will perform 10mWT to assess gait speed  - Patient will report 50% reduction in symptoms   - Patient will be able to tolerate VOR x 1 for 1 minute to suggest improved function of vestibular system     Long Term Goals (12 weeks):  - Patient will be independent in a comprehensive home exercise program  - Patient will improve scoring on DGI by 2 6 points from initial score to progress safety  - Patient will improve gait speed by 0 18 m/s from initial score to improve safety with community ambulation  - Patient will improve scoring on FGA by 4 points from initial score  to progress safety with dynamic tasks  - Patient will be able to demonstrate HT in gait without veering  - Patient will improve 6 Minute Walk Test score by 190 feet from initial score to promote improved cardiovascular endurance  - Patient will report 50% reduction in near falls in order to improve safety with functional tasks and reduce his risk for falls  - Patient will report going on walks at least 3 days per week to promote independence and improved cardiovascular endurance  - Patient will be able to ascend/descend stairs reciprocally with 1 UE assist to promote independence and safety with ADLs  - Patient will report 50% reduction in near falls when ambulating on uneven terrain      Cut off score    All date taken from [facility] Neuro Section or Rehab Measures      Kasper/64  MDC: 6 pts  Age Norms:  61-76: M - 54   F - 55  70-79: M - 47   F - 53  80-89: M - 48   F - 50 5xSTS: Destiney et al 2010  MDC: 2 3 sec  Age Norms:  60-69: 11 1 sec  70-79: 12 6 sec  80-89: 14 8 sec   TUG  MDC: 4 14 sec  Cut off score:  >13 5 sec community dwelling adults  >32 2 frail elderly  <20 I for basic transfers  >30 dependent on transfers 10 Meter Walk Test: Adrienne Lesch and Monster clements 2011  MDC: 0 18 m/s  20-29: M - 1 35 m   F - 1 34 m  30-39: M - 1 43 m   F - 1 34 m  40-49: M - 1 43 m   F - 1 39 m  50-59: M - 1 43 m   F - 1 31 m  60-69: M - 1 34 m   F - 1 24 m  70-79: M - 1 26 m   F - 1 13 m  80-89: M - 0 97 m   F - 0 94 m    Household Ambulator < 0 4 m/s  Limited Community Ambulator 0 4 - 0 8 m/s  [employer] 0 8 - 1 2 m/s  Safely cross the street > 1 2 m/s   FGA  MCID: 4 pts  Geriatrics/community < 22/30 fall risk  Geriatrics/community < 20/30 unexplained falls    DGI  MDC: vestibular - 4 pts  MDC: geriatric/community - 3 pts  Falls risk <19/24 mCTSIB  Norm: 20-60 yrs  Eyes open firm: norm sway 0 21-0 48  Eyes closed firm: norm sway 0 48-0 99  Eyes open foam: norm sway 0  38-0 71  Eyes closed foam: norm sway 0 70-2 22   6 Minute Walk Test  MDC: 190 98 ft  MCID: 164 ft    Age Norms  61-76: [address] ft (571 80 m)  F - 5156 ft (537 98 m)  70-79: M - 1729 ft (527 00 m)  F - 1545 ft (470 92 m)  80-89: M - 1368 ft (416 97 m)  F - 1286 ft (391 97 m) ABC: Christine Green, 2003  <67% increased risk for falls         Subjective    History of Present Illness  - Mechanism of injury: Patient presents to session with feelings of dizziness/imbalance since summer  Patient cares for  and is worried she will not be able to support him  Symptoms began suddenly  Patient endorses history of sinus trouble and currently uses Flonase daily  Symptoms are unpredictable and are described as sometimes swaying, with feeling as though she may lose her balance  Patient does have history of numbness in feet  Patient denies recent vision changes with most recent appointment in October  - Primary AD: none  - Assist level at home: Indepedent  - Decreased fine motor tasks: Yes, began around time of dizziness onset       Pain  Occasional headaches, patient suspects sinus related       Social Support  - Steps to enter house: 2 + 1 with B/L railings   - Stairs in house: 2 steps from kitchen to ground level with B/L railings; flight to cellar with gate to prevent falling    - Lives in: house (ranch)  - Lives with:     - Employment status: part time, hair dressing   - Hand dominance: RHD    Treatments  - Previous treatment: went to ENT, was prescribed Flonase   - Current treatment: Physical therapy  - Diagnostic Testing: Audiogram       Objective     UE MMT  - R Shoulder Flexion: 4+/5  L Shoulder Flexion: 4+/5  - R Shoulder Abduction: 4+/5  L Shoulder Abduction: 4+/5  - R Elbow Extension: 4+/5  L Elbow Extension: 4+/5  - R Elbow Flexion: 4+/5  L Elbow Flexion: 4+/5  - R Wrist Flexion: 4+/5   L Wrist Flexion: 4+/5  - R Wrist Extension: 4+/5  L Wrist Extension: 4+/5      LE MMT  - R Hip Flexion: 4/5   L Hip Flexion: 4/5  - R Hip Extension: 4/5   L Hip Extension: 4/5  - R Hip Abduction: 4/5   L Hip Abduction: 4/5  - R Hip Adduction: 4/5   L Hip Adduction: 4/5  - R Knee Extension: 4+/5  L Knee Extension: 4+/5  - R Knee Flexion: 4+/5   L Knee Flexion: 4+/5  - R Ankle DF: 4+/5   L Ankle DF: 4+/5  - R Ankle PF: 4+/5   L Ankle PF: 4+/5    Sensation  - Light touch: Intact  Patient endorses history of numbness/tingling in BLE, L>R; pt suggests related to sciatica       Coordination  - Heel to Shin: Normal  - Alternate Toe Taps: Normal  - Finger to Nose: Normal  - Dysdiadochokinesia: Normal       C/S Screen:   - ROM: Normal all planes  - Alar: (-)  - [place]: (-)   - VBI: (-)     VOMS  - Smooth Pursuits: Normal   - Saccades: Normal but reproduced symptoms   - Convergence: Normal   - VOR cx: Normal but reproduced symptoms, dizziness at worst 5/10   - VOR: Normal, reproduced symptoms   - Head thrust test: Normal     Postural Screen  - Observation: WNL     Palpation  - TTP L upper trap, levator scap        Outcome Measures Initial Eval  1/20/22        5xSTS defer sec        TUG defer sec        10 meter defer m/s        MARES defer/56        FGA defer/30        DGI defer/24        mCTSIB  - FTEO (firm)  - FTEC (firm)  - FTEO (foam)  - FTEC (foam)   30 sec  28 sec  12 sec  10 sec        6MWT defer ft                                     Precautions: Balance  Past Medical History:   Diagnosis Date    Anxiety     Bleeding from varicose vein     Environmental allergies     Fracture of radius     Obesity     Scoliosis     mild     Vaginitis     Vulvovaginitis

## 2022-01-24 ENCOUNTER — OFFICE VISIT (OUTPATIENT)
Dept: PHYSICAL THERAPY | Facility: CLINIC | Age: 82
End: 2022-01-24
Payer: MEDICARE

## 2022-01-24 DIAGNOSIS — R26.89 IMBALANCE: Primary | ICD-10-CM

## 2022-01-24 PROCEDURE — 97112 NEUROMUSCULAR REEDUCATION: CPT

## 2022-01-24 PROCEDURE — 97530 THERAPEUTIC ACTIVITIES: CPT

## 2022-01-24 NOTE — PROGRESS NOTES
Daily Note         Insurance:  A/CMS Eval/ Re-eval POC expires Nima Said #/ Referral # Total   Visits  Start date  Expiration date Extension  Visit limitation? PT only or  PT+OT? Co-Insurance   Medicare: CMS 22  Not required NA NA NA NA NA PT No                                                                  Today's date: 2022  Patient name: Radha Colunga  : 1940  MRN: [id number]  Referring provider: MARIPOSA Gray  Dx:   Encounter Diagnosis     ICD-10-CM    1  Imbalance  R26 89                   Subjective: Patient arrives to session with overall no change from IE  Patient voices concerns about being able to steady  when she feels dizzy/unsteady  Objective: See treatment diary below    TA:   - 5x STS: 21 43 sec  - 10mWT: 1 13 m/s  - 6MWT: 975 feet   - FGA:   - DGI:     NMR:   - VOR cx   Horizontal: 10x, 2 sets, increasing speed in 2nd set    Vertical: 10x, 2 sets, increasing speed in 2nd set  - VOR x 1   Horizontal, self-paced, 1 min x 2 sets, 3-4/10 dizziness   Vertical, self-paced, 1 min x 2 sets, 2-3/10 dizziness    Assessment: Patient tolerated session well today with continued objective testing from IE and initiation of interventions to address impairments  Patient able to ambulate 975 feet in 6MWT which is below age and gender-matched norm of 1286 feet  Patient's self-selected gait speed of 1 13 m/s indicates that she is a community ambulator but this speed is insufficient to safely cross a street  Patient's scores of / on FGA and  on DGI indicate poor dynamic balance as well as HIGH fall risk  Patient's 5x STS time of 21 43 seconds indicated impaired functional LE strength and power  Patient able to tolerate initiation of vestibular exercises to address noted impairments with minimal increase in symptoms  Plan to progress vestibular interventions as able at next visit and incorporate dynamic balance interventions   Patient will benefit from skilled PT to reduce fall risk, decrease dizziness symptoms, improve balance, and promote return to PLOF  Plan: Continue per plan of care  Progress treatment as tolerated           Outcome Measures Initial Eval  1/20/22 + 1/24/22        5xSTS 21 43 sec, 0UE        TUG NP sec        10 meter 1 13 m/s        VISHNU NP/56        FGA 13/30        DGI 14/24        mCTSIB  - FTEO (firm)  - FTEC (firm)  - FTEO (foam)  - FTEC (foam)   30 sec  28 sec  12 sec  10 sec        6MWT 975 ft

## 2022-01-25 ENCOUNTER — APPOINTMENT (OUTPATIENT)
Dept: PHYSICAL THERAPY | Facility: CLINIC | Age: 82
End: 2022-01-25
Payer: MEDICARE

## 2022-01-27 ENCOUNTER — OFFICE VISIT (OUTPATIENT)
Dept: PHYSICAL THERAPY | Facility: CLINIC | Age: 82
End: 2022-01-27
Payer: MEDICARE

## 2022-01-27 DIAGNOSIS — R26.89 IMBALANCE: Primary | ICD-10-CM

## 2022-01-27 PROCEDURE — 97112 NEUROMUSCULAR REEDUCATION: CPT

## 2022-01-27 NOTE — PROGRESS NOTES
Daily Note         Insurance:  [place]/CMS Eval/ Re-eval POC expires Sherry Dong #/ Referral # Total   Visits  Start date  Expiration date Extension  Visit limitation? PT only or  PT+OT? Co-Insurance   Medicare: CMS 22  Not required NA NA NA NA NA PT No                                                                  Today's date: 2022  Patient name: Michael Posadas  : 1940  MRN: [id number]  Referring provider: MARIPOSA Justice  Dx:   Encounter Diagnosis     ICD-10-CM    1  Imbalance  R26 89                   Subjective: Patient reports she had a HA yesterday  No HA this morning  Dizziness remains about the same with frequency approximately 1x/day  Objective: See treatment diary below    NMR:   - VOR cx   Horizontal: 1 min, 4/10 dizziness   Vertical: 1 min, 4/10 dizziness    CW: 30 sec, 5-6/10 dizziness   CCW: 30 sec, 5-6/10 dizziness   - VOR x 1   Horizontal, self-paced, 1 min , 3/10 dizziness   Horizontal, 100 bpm, 1 min, 3-4/10   Vertical, self-paced, 1 min, 1/10 dizziness   Vertical, 100 bpm, 1 min, 5/10 dizziness    - FTEO foam, 60"  - FTEC foam, 5-15" x 4    - Dynamic gait drills: tandem, FWD EC, bkwd  - Side stepping on foam, 10' x 3 laps  - Hurdles (fwd and lat) 9" x 4, 3 laps     Assessment: Patient tolerated session well today with continued vestibular training in addition to balance interventions  Patient did experience increase in dizziness with VOR and VOR cx but symptoms subsided quickly after ceasing activity  Patient relies heavily on vision to maintain balance per significant path deviation, slowed speed, and staggering steps when ambulating with eyes closed  Patient will benefit from continued skilled therapy to decrease symptoms and improve balance to promote return to PLOF  Plan: Continue per plan of care  Progress treatment as tolerated           Outcome Measures Initial Eval  22 + 22        5xSTS 21 43 sec, 0UE        TUG NP sec        10 meter 1 13 m/s VISHNU NP/56        FGA 13/30        DGI 14/24        mCTSIB  - FTEO (firm)  - FTEC (firm)  - FTEO (foam)  - FTEC (foam)   30 sec  28 sec  12 sec  10 sec        6MWT 975 ft

## 2022-01-31 ENCOUNTER — OFFICE VISIT (OUTPATIENT)
Dept: PHYSICAL THERAPY | Facility: CLINIC | Age: 82
End: 2022-01-31
Payer: MEDICARE

## 2022-01-31 DIAGNOSIS — R26.89 IMBALANCE: Primary | ICD-10-CM

## 2022-01-31 PROCEDURE — 97112 NEUROMUSCULAR REEDUCATION: CPT

## 2022-01-31 NOTE — PROGRESS NOTES
Daily Note         Insurance:  A/CMS Eval/ Re-eval POC expires Desiree Baca #/ Referral # Total   Visits  Start date  Expiration date Extension  Visit limitation? PT only or  PT+OT? Co-Insurance   Medicare: CMS 22  Not required NA NA NA NA NA PT No                                                                  Today's date: 2022  Patient name: Tello Cheung  : 1940  MRN: [id number]  Referring provider: MARIPOSA Patterson  Dx:   Encounter Diagnosis     ICD-10-CM    1  Imbalance  R26 89                   Subjective: Patient reports she has noticed feeling dizzy after eating  Per chart review and patient, no history of diabetes/blood sugar dysregulation  Objective: See treatment diary below    NMR:   - VOR cx   Horizontal: 1 min, 1/10 dizziness   Vertical: 1 min, 1/10 dizziness    CW: 45 sec, 1/10 dizziness   CCW: 45 sec, 0/10 dizziness   - VOR x 1   Horizontal, 100 bpm, 1 min, 2 sets, 4/10 dizziness   Vertical, 100 bpm, 1 min, 1/10 dizziness     Vertical, 120 bpm, 1 min, 2/10 dizziness     - FTEO foam, 60"  - FTEC foam, 20" x 3  - Step up/down to airex, 20x    - Ambulation with eyes closed, 30' x 6  - Side stepping to blaze pod taps, 4" reaction time, 4 minutes  - Hurdles (fwd) 9" x 4, 6x  - Hurdles (lat) 9" x 2 + 6" x 2, 3 laps  - Hurdles (lat) 9" x 4, 1 lap    Assessment: Patient tolerated session well today with continued vestibular training in addition to balance interventions  Initially patient noted feeling lightheaded with vestibular interventions but upon discussion patient described lightheadedness as "pre-dizziness" rather than feeling as though she might pass out  Patient able to tolerate vestibular exercises without significant increase in symptoms; plan to progress next visit   Patient continues to heavily rely on vision for balance but was able to maintain static stance on foam with eyes closed longer than previous session, suggesting improved vestibular input in absence of visual and somatosensory input  Patient will benefit from continued skilled therapy to decrease dizziness symptoms and improve static and dynamic balance for return to PLOF  Plan: Continue per plan of care  Progress treatment as tolerated           Outcome Measures Initial Eval  1/20/22 + 1/24/22        5xSTS 21 43 sec, 0UE        TUG NP sec        10 meter 1 13 m/s        VISHNU NP/56        FGA 13/30        DGI 14/24        mCTSIB  - FTEO (firm)  - FTEC (firm)  - FTEO (foam)  - FTEC (foam)   30 sec  28 sec  12 sec  10 sec        6MWT 975 ft

## 2022-02-01 ENCOUNTER — APPOINTMENT (OUTPATIENT)
Dept: PHYSICAL THERAPY | Facility: CLINIC | Age: 82
End: 2022-02-01
Payer: MEDICARE

## 2022-02-03 ENCOUNTER — OFFICE VISIT (OUTPATIENT)
Dept: PHYSICAL THERAPY | Facility: CLINIC | Age: 82
End: 2022-02-03
Payer: MEDICARE

## 2022-02-03 DIAGNOSIS — R26.89 IMBALANCE: Primary | ICD-10-CM

## 2022-02-03 PROCEDURE — 97112 NEUROMUSCULAR REEDUCATION: CPT

## 2022-02-03 NOTE — PROGRESS NOTES
Daily Note         Insurance:  A/CMS Eval/ Re-eval POC expires Gregg Medina #/ Referral # Total   Visits  Start date  Expiration date Extension  Visit limitation? PT only or  PT+OT? Co-Insurance   Medicare: CMS 22  Not required NA NA NA NA NA PT No                                                                  Today's date: 2/3/2022  Patient name: Zoe Loya  : 1940  MRN: [id number]  Referring provider: MARIPOSA Dowd  Dx:   Encounter Diagnosis     ICD-10-CM    1  Imbalance  R26 89                   Subjective: Patient reports she is feeling dizzy less often with overall less intensity except on occasion  Objective: See treatment diary below    NMR:   - VOR cx in busy background   Horizontal: 1 min, 2/10 dizziness   Vertical: 1 min, 1/10 dizziness    CW: 1 min, 2-3/10 dizziness   CCW: 1 min, 2-3/10 dizziness   - VOR x 1   Horizontal, 120 bpm, 2 min, 0/10 dizziness   Vertical, 120 bpm, 1 min 40 sec, 3/10 dizziness     Vertical, 120 bpm, 2 min, 2/10 dizziness    Horizontal 120 bpm, 2 min, FA on foam, 2-3/10 dizziness     - FTEO foam, 60"  - FTEC foam, 30" x 3    - Ambulation with eyes closed, 50' x 6    - Four square hurdles, 9" (6" for backwards), 3 minutes     Assessment: Patient tolerated session well today  Able to progress vestibular interventions to more difficult parameters to further challenge vestibular system with good performance  Introduction of busy background induced reproduction of symptoms  Continues to be challenged with static and dynamic exercises in absence of visual input  Patient required lower angel for stepping posteriorly than other directions but had no LOB  Patient will benefit from continued skilled therapy to decrease dizziness symptoms and improve static and dynamic balance for return to PLOF  Plan: Continue per plan of care  Progress treatment as tolerated           Outcome Measures Initial Eval  22 + 22        5xSTS 21 43 sec, 0UE TUG NP sec        10 meter 1 13 m/s        VISHNU NP/56        FGA 13/30        DGI 14/24        mCTSIB  - FTEO (firm)  - FTEC (firm)  - FTEO (foam)  - FTEC (foam)   30 sec  28 sec  12 sec  10 sec        6MWT 975 ft

## 2022-02-07 ENCOUNTER — OFFICE VISIT (OUTPATIENT)
Dept: PHYSICAL THERAPY | Facility: CLINIC | Age: 82
End: 2022-02-07
Payer: MEDICARE

## 2022-02-07 DIAGNOSIS — R26.89 IMBALANCE: Primary | ICD-10-CM

## 2022-02-07 PROCEDURE — 97112 NEUROMUSCULAR REEDUCATION: CPT

## 2022-02-07 NOTE — PROGRESS NOTES
Daily Note         Insurance:  A/CMS Eval/ Re-eval POC expires Scarlet Gandhi #/ Referral # Total   Visits  Start date  Expiration date Extension  Visit limitation? PT only or  PT+OT? Co-Insurance   Medicare: CMS 22  Not required NA NA NA NA NA PT No                                                                  Today's date: 2022  Patient name: Lauryn Ragland  : 1940  MRN: [id number]  Referring provider: MARIPOSA Chaparro  Dx:   Encounter Diagnosis     ICD-10-CM    1  Imbalance  R26 89                   Subjective: Patient reports she has not had overt dizzy spells since prior visit but she has had "lightheadedness" which she describes as "pre-dizziness " Baseline today 0/10 dizziness  Objective: See treatment diary below    NMR:   - VOR cx in busy background   Horizontal: 1 min, 3/10 dizziness   Vertical: 1 min, 1-2/10 dizziness    CW: 1 min, 1/10 dizziness   CCW: 1 min, 3/10 dizziness     - VOR x 1 in busy background   Horizontal, 120 bpm, 1 min, 0/10 dizziness   Vertical, 120 bpm, 1 min, 0/10 dizziness   Horizontal, 120 bpm, 2 min, 2/10 dizziness   Vertical, 120 bpm, 2 min, 0/10 dizziness    - FTEO foam, 60"  - FTEC foam, 30" x 3    - Ambulation with eyes closed, 50' x 6  - Tandem ambulation, 40'     - Forward hurdles, 9" x 2; 5 sets  - Lateral hurdles, 9" x 2, 5 sets     Assessment: Patient tolerated session well today with progression of vestibular interventions to consistently include busy background with limited increase in symptoms  Patient frequently utilizes ankle strategy with eyes closed on foam but does not require higher level compensatory reaction  Patient continues to be challenged with eyes closed but demonstrated overall less sway today  Plan to continue with more dynamic exercises next session  Patient will benefit from continued skilled therapy to decrease dizziness symptoms and improve static and dynamic balance for return to PLOF       Plan: Continue per plan of care   Progress treatment as tolerated           Outcome Measures Initial Eval  1/20/22 + 1/24/22        5xSTS 21 43 sec, 0UE        TUG NP sec        10 meter 1 13 m/s        MARES NP/56        FGA 13/30        DGI 14/24        mCTSIB  - FTEO (firm)  - FTEC (firm)  - FTEO (foam)  - FTEC (foam)   30 sec  28 sec  12 sec  10 sec        6MWT 975 ft

## 2022-02-10 ENCOUNTER — OFFICE VISIT (OUTPATIENT)
Dept: PHYSICAL THERAPY | Facility: CLINIC | Age: 82
End: 2022-02-10
Payer: MEDICARE

## 2022-02-10 DIAGNOSIS — R26.89 IMBALANCE: Primary | ICD-10-CM

## 2022-02-10 PROCEDURE — 97112 NEUROMUSCULAR REEDUCATION: CPT

## 2022-02-10 NOTE — PROGRESS NOTES
Daily Note         Insurance:  A/CMS Eval/ Re-eval POC expires Marika Neeta #/ Referral # Total   Visits  Start date  Expiration date Extension  Visit limitation? PT only or  PT+OT? Co-Insurance   Medicare: CMS 22  Not required NA NA NA NA NA PT No                                                                  Today's date: 2/10/2022  Patient name: Kath Ulrich  : 1940  MRN: [id number]  Referring provider: MARIPOSA Hess  Dx:   Encounter Diagnosis     ICD-10-CM    1  Imbalance  R26 89                   Subjective: Patient reports she felt dizzy this morning but felt better after she showered  Objective: See treatment diary below    NMR:   - VOR cx in busy background   Horizontal: 1 min, 3/10 dizziness   Vertical: 1 min, 4/10 dizziness    CW: 1 min, 3/10 dizziness   CCW: 1 min, 3/10 dizziness     - VOR x 1 in busy background   Horizontal, 120 bpm, 2 min, 2/10 dizziness   Vertical, 120 bpm, 2 min, 0/10 dizziness      - FTEO foam, 60"  - FTEC foam, 30" x 3    - Ambulation with eyes closed, 50' x 4  - Gait with head turns, H/V 50' x 2 ea     - FAEO head turns, 20x  - FAEC head turns, 20x    HEP Issued 2/10:  Access Code: [email or web address]  URL: [email or web address]  Date: 02/10/2022  Prepared by: Marium Confer    Exercises  · Standing with Head Rotation - 1 x daily - 7 x weekly - 2 sets - 20 reps  · Romberg Stance with Eyes Closed - 1 x daily - 7 x weekly - 3 reps - 30 seconds hold  · Standing Gaze Stabilization with Head Rotation - 1 x daily - 7 x weekly - 3 reps - 1 minute hold  · Seated VOR Cancellation - 1 x daily - 7 x weekly - 2 sets - 20 reps          Assessment: Patient tolerated session well today  Able to reproduce dizziness with VOR x1, VOR cx, and head turns in static stance and gait  Issued HEP today as outlined above with patient verbalizing understanding  Patient demonstrated increased sway on foam today particularly with eyes closed   Patient will continue to benefit from skilled PT to decrease dizziness symptoms and improve static and dynamic balance for return to PLOF  Plan: Continue per plan of care  Progress treatment as tolerated           Outcome Measures Initial Eval  1/20/22 + 1/24/22        5xSTS 21 43 sec, 0UE        TUG NP sec        10 meter 1 13 m/s        VISHNU NP/56        FGA 13/30        DGI 14/24        mCTSIB  - FTEO (firm)  - FTEC (firm)  - FTEO (foam)  - FTEC (foam)   30 sec  28 sec  12 sec  10 sec        6MWT 975 ft

## 2022-02-14 ENCOUNTER — OFFICE VISIT (OUTPATIENT)
Dept: PHYSICAL THERAPY | Facility: CLINIC | Age: 82
End: 2022-02-14
Payer: MEDICARE

## 2022-02-14 DIAGNOSIS — R26.89 IMBALANCE: Primary | ICD-10-CM

## 2022-02-14 PROCEDURE — 97112 NEUROMUSCULAR REEDUCATION: CPT

## 2022-02-14 NOTE — PROGRESS NOTES
Daily Note         Insurance:  A/CMS Eval/ Re-eval POC expires Arianna Carter #/ Referral # Total   Visits  Start date  Expiration date Extension  Visit limitation? PT only or  PT+OT? Co-Insurance   Medicare: CMS 22  Not required NA NA NA NA NA PT No                                                                  Today's date: 2022  Patient name: Pily Snell  : 1940  MRN: [id number]  Referring provider: MARIPOSA Henry  Dx:   Encounter Diagnosis     ICD-10-CM    1  Imbalance  R26 89                   Subjective: Patient reports she has been working on HEP  She had an "off day" on Saturday but felt better yesterday  Objective: See treatment diary below    NMR:   - VOR cx in busy background   Horizontal: 1 min, 1/10 dizziness   Vertical: 1 min, 1/10 dizziness    CW: 1 min, 1/10 dizziness   CCW: 1 min, 1/10 dizziness     - FTEO foam, 60"  - FTEC foam, 30" x 2    - Ambulation with eyes closed, 40' x 4  - Gait with head turns, H/V 40' x 4 ea   - Sidestepping on foam, 10' x 2   - Tandem ambulation, 20' x 2  - Step taps to 6" step from airex, 20x B/L  - Fwd angel from/to airex with 180 deg turns, 15 cycles     - FTEO head turns, busy background, 20x, 2/10 dizziness  - FTEO head nods, busy background, 20x, 2-3/10 dizziness   - FTEC head turns, 20x, 2 sets     HEP Issued 2/10:  Access Code: [email or web address]  URL: [email or web address]  Date: 02/10/2022  Prepared by: Earl Fusi    Exercises  · Standing with Head Rotation - 1 x daily - 7 x weekly - 2 sets - 20 reps  · Romberg Stance with Eyes Closed - 1 x daily - 7 x weekly - 3 reps - 30 seconds hold  · Standing Gaze Stabilization with Head Rotation - 1 x daily - 7 x weekly - 3 reps - 1 minute hold  · Seated VOR Cancellation - 1 x daily - 7 x weekly - 2 sets - 20 reps      Assessment: Patient tolerated session well today  Overall less exacerbation of symptoms with VOR cx   Progressed to more dynamic exercises with noted difficulty on foam and with eyes closed, suggesting continued reliance on visual and somatosensory systems  Increased sway during head turns with eyes closed  Patient will benefit from continued skilled PT to decrease dizziness symptoms and improve static and dynamic balance for return to PLOF  Plan: Continue per plan of care  Progress treatment as tolerated           Outcome Measures Initial Eval  1/20/22 + 1/24/22        5xSTS 21 43 sec, 0UE        TUG NP sec        10 meter 1 13 m/s        VISHNU NP/56        FGA 13/30        DGI 14/24        mCTSIB  - FTEO (firm)  - FTEC (firm)  - FTEO (foam)  - FTEC (foam)   30 sec  28 sec  12 sec  10 sec        6MWT 975 ft

## 2022-02-16 ENCOUNTER — APPOINTMENT (OUTPATIENT)
Dept: LAB | Facility: HOSPITAL | Age: 82
End: 2022-02-16
Payer: MEDICARE

## 2022-02-16 DIAGNOSIS — H90.3 SENSORINEURAL HEARING LOSS (SNHL), BILATERAL: ICD-10-CM

## 2022-02-16 DIAGNOSIS — E55.9 VITAMIN D DEFICIENCY: ICD-10-CM

## 2022-02-16 DIAGNOSIS — R49.0 HOARSENESS OF VOICE: ICD-10-CM

## 2022-02-16 DIAGNOSIS — R26.89 IMBALANCE: ICD-10-CM

## 2022-02-16 DIAGNOSIS — E55.9 VITAMIN D DEFICIENCY: Primary | ICD-10-CM

## 2022-02-16 DIAGNOSIS — E07.89 THYROID FULLNESS: ICD-10-CM

## 2022-02-16 DIAGNOSIS — H69.83 ETD (EUSTACHIAN TUBE DYSFUNCTION), BILATERAL: ICD-10-CM

## 2022-02-16 LAB
25(OH)D3 SERPL-MCNC: 17.5 NG/ML (ref 30–100)
ALBUMIN SERPL BCP-MCNC: 3.3 G/DL (ref 3.5–5)
ALP SERPL-CCNC: 71 U/L (ref 46–116)
ALT SERPL W P-5'-P-CCNC: 19 U/L (ref 12–78)
ANION GAP SERPL CALCULATED.3IONS-SCNC: 5 MMOL/L (ref 4–13)
AST SERPL W P-5'-P-CCNC: 19 U/L (ref 5–45)
BASOPHILS # BLD AUTO: 0.06 THOUSANDS/ΜL (ref 0–0.1)
BASOPHILS NFR BLD AUTO: 1 % (ref 0–1)
BILIRUB SERPL-MCNC: 0.51 MG/DL (ref 0.2–1)
BUN SERPL-MCNC: 19 MG/DL (ref 5–25)
CALCIUM ALBUM COR SERPL-MCNC: 8.7 MG/DL (ref 8.3–10.1)
CALCIUM SERPL-MCNC: 8.1 MG/DL (ref 8.3–10.1)
CHLORIDE SERPL-SCNC: 104 MMOL/L (ref 100–108)
CO2 SERPL-SCNC: 29 MMOL/L (ref 21–32)
CREAT SERPL-MCNC: 0.77 MG/DL (ref 0.6–1.3)
CRP SERPL QL: 0.9 MG/L
EOSINOPHIL # BLD AUTO: 0.17 THOUSAND/ΜL (ref 0–0.61)
EOSINOPHIL NFR BLD AUTO: 4 % (ref 0–6)
ERYTHROCYTE [DISTWIDTH] IN BLOOD BY AUTOMATED COUNT: 16.9 % (ref 11.6–15.1)
ERYTHROCYTE [SEDIMENTATION RATE] IN BLOOD: 41 MM/HOUR (ref 0–29)
GFR SERPL CREATININE-BSD FRML MDRD: 73 ML/MIN/1.73SQ M
GLUCOSE P FAST SERPL-MCNC: 93 MG/DL (ref 65–99)
HCT VFR BLD AUTO: 41.7 % (ref 34.8–46.1)
HGB BLD-MCNC: 12.1 G/DL (ref 11.5–15.4)
IMM GRANULOCYTES # BLD AUTO: 0.01 THOUSAND/UL (ref 0–0.2)
IMM GRANULOCYTES NFR BLD AUTO: 0 % (ref 0–2)
LYMPHOCYTES # BLD AUTO: 1.63 THOUSANDS/ΜL (ref 0.6–4.47)
LYMPHOCYTES NFR BLD AUTO: 35 % (ref 14–44)
MCH RBC QN AUTO: 20 PG (ref 26.8–34.3)
MCHC RBC AUTO-ENTMCNC: 29 G/DL (ref 31.4–37.4)
MCV RBC AUTO: 69 FL (ref 82–98)
MONOCYTES # BLD AUTO: 0.29 THOUSAND/ΜL (ref 0.17–1.22)
MONOCYTES NFR BLD AUTO: 6 % (ref 4–12)
NEUTROPHILS # BLD AUTO: 2.44 THOUSANDS/ΜL (ref 1.85–7.62)
NEUTS SEG NFR BLD AUTO: 54 % (ref 43–75)
NRBC BLD AUTO-RTO: 0 /100 WBCS
PLATELET # BLD AUTO: 228 THOUSANDS/UL (ref 149–390)
PMV BLD AUTO: 9.3 FL (ref 8.9–12.7)
POTASSIUM SERPL-SCNC: 4.7 MMOL/L (ref 3.5–5.3)
PROT SERPL-MCNC: 7.6 G/DL (ref 6.4–8.2)
RBC # BLD AUTO: 6.04 MILLION/UL (ref 3.81–5.12)
RHEUMATOID FACT SER QL LA: NEGATIVE
SODIUM SERPL-SCNC: 138 MMOL/L (ref 136–145)
T4 SERPL-MCNC: 7.6 UG/DL (ref 4.7–13.3)
TSH SERPL DL<=0.05 MIU/L-ACNC: 2.12 UIU/ML (ref 0.36–3.74)
WBC # BLD AUTO: 4.6 THOUSAND/UL (ref 4.31–10.16)

## 2022-02-16 PROCEDURE — 84436 ASSAY OF TOTAL THYROXINE: CPT

## 2022-02-16 PROCEDURE — 83970 ASSAY OF PARATHORMONE: CPT

## 2022-02-16 PROCEDURE — 85652 RBC SED RATE AUTOMATED: CPT

## 2022-02-16 PROCEDURE — 82306 VITAMIN D 25 HYDROXY: CPT

## 2022-02-16 PROCEDURE — 36415 COLL VENOUS BLD VENIPUNCTURE: CPT

## 2022-02-16 PROCEDURE — 86140 C-REACTIVE PROTEIN: CPT

## 2022-02-16 PROCEDURE — 84443 ASSAY THYROID STIM HORMONE: CPT

## 2022-02-16 PROCEDURE — 80053 COMPREHEN METABOLIC PANEL: CPT

## 2022-02-16 PROCEDURE — 85025 COMPLETE CBC W/AUTO DIFF WBC: CPT

## 2022-02-16 PROCEDURE — 86038 ANTINUCLEAR ANTIBODIES: CPT

## 2022-02-16 PROCEDURE — 86430 RHEUMATOID FACTOR TEST QUAL: CPT

## 2022-02-17 ENCOUNTER — OFFICE VISIT (OUTPATIENT)
Dept: PHYSICAL THERAPY | Facility: CLINIC | Age: 82
End: 2022-02-17
Payer: MEDICARE

## 2022-02-17 DIAGNOSIS — R26.89 IMBALANCE: Primary | ICD-10-CM

## 2022-02-17 LAB — PTH-INTACT SERPL-MCNC: 51.9 PG/ML (ref 18.4–80.1)

## 2022-02-17 PROCEDURE — 97112 NEUROMUSCULAR REEDUCATION: CPT

## 2022-02-17 NOTE — PROGRESS NOTES
Daily Note         Insurance:  A/CMS Eval/ Re-eval POC expires Arianna Carter #/ Referral # Total   Visits  Start date  Expiration date Extension  Visit limitation? PT only or  PT+OT? Co-Insurance   Medicare: CMS 22  Not required NA NA NA NA NA PT No                                                                  Today's date: 2022  Patient name: Pily Snell  : 1940  MRN: [id number]  Referring provider: MARIPOSA Henry  Dx:   Encounter Diagnosis     ICD-10-CM    1  Imbalance  R26 89                   Subjective: Patient reports that overall she feels she is improving, but she had thought the progress would be faster  She did feel dizzy last night but overall not too bad from previous session  Objective: See treatment diary below    NMR:   - VOR cx in busy background on foam    Horizontal: 1 min, 1/10 dizziness   Vertical: 1 min, 1/10 dizziness    CW: 1 min, 1/10 dizziness   CCW: 1 min, 1/10 dizziness      - Ambulation with eyes closed, 25' x 3  - Backwards ambulation, 25' x 3   - Sidestepping on foam, 5' x 10  - Tandem ambulation on foam, 5' x 6  - FWD/BACK with posterior resistance (red), 10x   - Side stepping with cone taps with posterior resistance (red), 5 cones x 6 laps    - FTEO head turns, busy background, 20x  - FTEO head nods, busy background, 20x    HEP Issued 2/10:  Access Code: [email or web address]  URL: [email or web address]  Date: 02/10/2022  Prepared by: Mady Fusi    Exercises  · Standing with Head Rotation - 1 x daily - 7 x weekly - 2 sets - 20 reps  · Romberg Stance with Eyes Closed - 1 x daily - 7 x weekly - 3 reps - 30 seconds hold  · Standing Gaze Stabilization with Head Rotation - 1 x daily - 7 x weekly - 3 reps - 1 minute hold  · Seated VOR Cancellation - 1 x daily - 7 x weekly - 2 sets - 20 reps      Assessment: Patient tolerated session well today  Less exacerbation of symptoms throughout session   Patient able to elicit stepping strategy to avoid total LOB with posterior resisted cone taps  Patient challenged with tandem on foam with frequent ankle and hip strategies  Noted less path deviation when ambulating with eyes closed today  Patient will benefit from continued skilled therapy to decrease dizziness symptoms and improve static and dynamic balance for return to PLOF  Plan: Continue per plan of care  Progress treatment as tolerated  PN next visit         Outcome Measures Initial Eval  1/20/22 + 1/24/22        5xSTS 21 43 sec, 0UE        TUG NP sec        10 meter 1 13 m/s        VISHNU NP/56        FGA 13/30        DGI 14/24        mCTSIB  - FTEO (firm)  - FTEC (firm)  - FTEO (foam)  - FTEC (foam)   30 sec  28 sec  12 sec  10 sec        6MWT 975 ft

## 2022-02-18 LAB — RYE IGE QN: NEGATIVE

## 2022-02-21 ENCOUNTER — OFFICE VISIT (OUTPATIENT)
Dept: OTOLARYNGOLOGY | Facility: CLINIC | Age: 82
End: 2022-02-21
Payer: MEDICARE

## 2022-02-21 ENCOUNTER — EVALUATION (OUTPATIENT)
Dept: PHYSICAL THERAPY | Facility: CLINIC | Age: 82
End: 2022-02-21
Payer: MEDICARE

## 2022-02-21 VITALS — WEIGHT: 194.6 LBS | HEIGHT: 61 IN | BODY MASS INDEX: 36.74 KG/M2 | TEMPERATURE: 97.9 F

## 2022-02-21 DIAGNOSIS — R26.89 IMBALANCE: Primary | ICD-10-CM

## 2022-02-21 DIAGNOSIS — K22.70 BARRETT'S ESOPHAGUS WITHOUT DYSPLASIA: ICD-10-CM

## 2022-02-21 DIAGNOSIS — E55.9 VITAMIN D DEFICIENCY: ICD-10-CM

## 2022-02-21 DIAGNOSIS — R49.0 HOARSENESS OF VOICE: ICD-10-CM

## 2022-02-21 DIAGNOSIS — H69.83 ETD (EUSTACHIAN TUBE DYSFUNCTION), BILATERAL: ICD-10-CM

## 2022-02-21 DIAGNOSIS — H90.3 SENSORINEURAL HEARING LOSS (SNHL), BILATERAL: ICD-10-CM

## 2022-02-21 PROCEDURE — 97530 THERAPEUTIC ACTIVITIES: CPT

## 2022-02-21 PROCEDURE — 97112 NEUROMUSCULAR REEDUCATION: CPT

## 2022-02-21 PROCEDURE — 99214 OFFICE O/P EST MOD 30 MIN: CPT | Performed by: NURSE PRACTITIONER

## 2022-02-21 RX ORDER — ERGOCALCIFEROL 1.25 MG/1
50000 CAPSULE ORAL WEEKLY
Qty: 12 CAPSULE | Refills: 0 | Status: SHIPPED | OUTPATIENT
Start: 2022-02-21

## 2022-02-21 NOTE — ASSESSMENT & PLAN NOTE
On exam, bilateral eac clear, no serous fluid, no cerumen impaction  Symptoms include left aural fullness, post nasal drip, imbalance, headaches  Improving since last visit with PT interventions  Audiogram last visit indicating bilateral mild sloping to moderate SNHL, type A tymps with good word discrimination  May consider hearing amplification  Reviewed recent lab studies indicating TSH level normal, calcium level low 8 1, and vitamin D level low 17  Discussed possible causes of vertigo including neurology, cardiac, autoimmune, Otitis media, sinusitis, ETD, and inner ear concerns  Discussed impact of vitamin D deficiency and hypocalcemia on symptoms  Treatment options include at home epley's, repeat lab studies, continue vestibular therapy, VNG testing, MRI brain with IAC, nasal steroids, saline rinses   Script for vitamin D and recommend OTC calcium  After discussion agreed to PT, may continue Fluticasone nasal spray 2 sprays daily, Saline rinses daily for nasal symptoms  Given scripts for repeat labs for vit D, B12, and calcium in 10 weeks  Script sent to pharmacy for vitamin D, and OTC calcium supplement      Follow up in 12 weeks to monitor, consider VNG, If no change symptoms in 4 more weeks (upon completion of PT), then consider MRI

## 2022-02-21 NOTE — PROGRESS NOTES
Assessment/Plan:    Imbalance  On exam, bilateral eac clear, no serous fluid, no cerumen impaction  Symptoms include left aural fullness, post nasal drip, imbalance, headaches  Improving since last visit with PT interventions  Audiogram last visit indicating bilateral mild sloping to moderate SNHL, type A tymps with good word discrimination  May consider hearing amplification  Reviewed recent lab studies indicating TSH level normal, calcium level low 8 1, and vitamin D level low 17  Discussed possible causes of vertigo including neurology, cardiac, autoimmune, Otitis media, sinusitis, ETD, and inner ear concerns  Discussed impact of vitamin D deficiency and hypocalcemia on symptoms  Treatment options include at home epley's, repeat lab studies, continue vestibular therapy, VNG testing, MRI brain with IAC, nasal steroids, saline rinses   Script for vitamin D and recommend OTC calcium  After discussion agreed to PT, may continue Fluticasone nasal spray 2 sprays daily, Saline rinses daily for nasal symptoms  Given scripts for repeat labs for vit D, B12, and calcium in 10 weeks  Script sent to pharmacy for vitamin D, and OTC calcium supplement  Follow up in 12 weeks to monitor, consider VNG, If no change symptoms in 4 more weeks (upon completion of PT), then consider MRI                         Diagnoses and all orders for this visit:    Imbalance  -     Vitamin B12/Folate, Serum Panel; Future  -     Vitamin D 25 hydroxy; Future  -     Calcium; Future    ETD (Eustachian tube dysfunction), bilateral  -     Vitamin B12/Folate, Serum Panel; Future  -     Vitamin D 25 hydroxy; Future  -     Calcium; Future    Hoarseness of voice  -     Vitamin B12/Folate, Serum Panel; Future  -     Vitamin D 25 hydroxy; Future  -     Calcium; Future    Sensorineural hearing loss (SNHL), bilateral  -     Vitamin B12/Folate, Serum Panel; Future  -     Vitamin D 25 hydroxy; Future  -     Calcium;  Future    Vitamin D deficiency  -     Vitamin B12/Folate, Serum Panel; Future  -     Vitamin D 25 hydroxy; Future  -     Calcium; Future  -     ergocalciferol (VITAMIN D2) 50,000 units; Take 1 capsule (50,000 Units total) by mouth once a week    Moeller's esophagus without dysplasia  -     Vitamin B12/Folate, Serum Panel; Future  -     Vitamin D 25 hydroxy; Future  -     Calcium; Future          Subjective:      Patient ID: Kosta Curtis is a [de-identified] y o  female  Presents today as a follow up due to dizziness and imbalance  Symptoms began near end of summer  Some improvement with PT   Swaying sensation  Occurs at times while lying in bed and rolls over  No falls  Headaches more recently when gets dizzy episode  Concerned about sinuses causing dizziness  Left ear concerns, felt bug go inside left ear, last spring and since then ear does not feel right  If gets sore throat feels lump inside throat  Diagnosed with sleep apnea, prior care with pulmonary  Sleeps sitting up due to snoring  The following portions of the patient's history were reviewed and updated as appropriate: allergies, current medications, past family history, past medical history, past social history, past surgical history and problem list     Review of Systems   Constitutional: Negative  HENT: Negative for congestion, ear discharge, ear pain, hearing loss, nosebleeds, postnasal drip, rhinorrhea, sinus pressure, sinus pain, sore throat, tinnitus and voice change  Eyes: Negative  Respiratory: Negative for chest tightness and shortness of breath  Cardiovascular: Negative  Gastrointestinal: Negative  Endocrine: Negative  Musculoskeletal: Negative  Skin: Negative for color change  Neurological: Positive for dizziness  Negative for numbness and headaches  Psychiatric/Behavioral: Negative            Objective:      Temp 97 9 °F (36 6 °C) (Temporal)   Ht 5' 1" (1 549 m)   Wt 88 3 kg (194 lb 9 6 oz)   BMI 36 77 kg/m² Physical Exam  Constitutional:       Appearance: She is well-developed  HENT:      Head: Normocephalic  Right Ear: Hearing, tympanic membrane, ear canal and external ear normal  No decreased hearing noted  No drainage or tenderness  Tympanic membrane is not perforated or erythematous  Left Ear: Hearing, tympanic membrane, ear canal and external ear normal  No decreased hearing noted  No drainage or tenderness  Tympanic membrane is not perforated or erythematous  Nose: Nose normal  No nasal deformity or septal deviation  Mouth/Throat:      Mouth: Mucous membranes are not pale and not dry  No oral lesions  Dentition: Normal dentition  Pharynx: Uvula midline  No oropharyngeal exudate  Neck:      Trachea: No tracheal deviation  Cardiovascular:      Rate and Rhythm: Normal rate  Pulmonary:      Effort: Pulmonary effort is normal  No accessory muscle usage or respiratory distress  Musculoskeletal:      Right shoulder: Normal range of motion  Cervical back: Full passive range of motion without pain, normal range of motion and neck supple  Lymphadenopathy:      Cervical: No cervical adenopathy  Skin:     General: Skin is warm and dry  Neurological:      Mental Status: She is alert and oriented to person, place, and time  Cranial Nerves: No cranial nerve deficit  Sensory: No sensory deficit  Psychiatric:         Behavior: Behavior is cooperative

## 2022-02-21 NOTE — PROGRESS NOTES
PT Re-Evaluation / Progress Note         Insurance:  [place]/CMS Eval/ Re-eval POC expires Delia De La Cruz #/ Referral # Total   Visits  Start date  Expiration date Extension  Visit limitation? PT only or  PT+OT? Co-Insurance   Medicare: CMS 22  Not required NA NA NA NA NA PT No    22                                                               Today's date: 2022  Patient name: Natividad Lazo  : 1940  MRN: [id number]  Referring provider: MARIPOSA Kimble  Dx:   Encounter Diagnosis     ICD-10-CM    1  Imbalance  R26 89          Assessment  Assessment details: Patient is a [de-identified] y o  Female who has been attending skilled outpatient PT for 1 month due to dizziness and feelings of imbalance  Since initiating skilled PT, patient has made progress toward goals and has reduced overall frequency and intensity of dizziness by 70% per patient  Patient has improved LE power significantly per 5x STS which improved from 21 43 seconds to 10 93 seconds  Patient was able to ambulate 75 feet further in 6MWT which is non-significant improvement  Patient's top gait speed of 1 34 m/s today suggests ability to independently and safely cross a street  Patient was able to complete all conditions of mCTSIB today, albeit with sway in conditions with eyes closed  Patient demonstrated significant improvement in dynamic balance per 4-point improvement of DGI and 6-point improvement of FGA  Despite significant improvements, patient is still at risk of falls per DGI and FGA  Patient has met all 7 short term goals and 3/10 long term goals at this time  Patient will benefit from continued skilled therapy to improve static and dynamic balance, resolve symptoms of dizziness, and reduce fall risk in order to achieve maximal daily function and participation in ADLs         Impairments: Impaired balance, Impaired physical strength, Lacks appropriate HEP and Safety issue  Understanding of Dx/Px/POC: Good  Prognosis: Good    Patient verbalized understanding of POC  Please contact me if you have any questions or recommendations  Thank you for the referral and the opportunity to share in Linh Anderson's care          Plan  Plan details: Skilled PT    Patient would benefit from: Skilled PT  Planned modality interventions: Biofeedback  Planned therapy interventions: Balance, Functional ROM exercises, Gait training, HEP, Manual therapy, Motor coordination training, Neuromuscular re-education, Patient education, Strengthening, Stretching, Therapeutic activities and Therapeutic exercises  Frequency: 2x/wk  Duration in weeks: 12  Plan of Care beginning date: 1/20/22  Plan of Care expiration date: 12 weeks - 4/14/22  Treatment plan discussed with: Patient       Goals  Short Term Goals (4 weeks):    - Patient will be independent in basic HEP 2-3 weeks - MET  - Patient will improve 5xSTS score by 2 3 seconds from initial score to promote improved LE functional strength needed for ADLs - MET  - Patient will complete FGA and DGI to assess dynamic balance - MET  - Patient will perform 6MWT to assess endurance - MET  - Patient will perform 10mWT to assess gait speed - MET  - Patient will report 50% reduction in symptoms - MET  - Patient will be able to tolerate VOR x 1 for 1 minute to suggest improved function of vestibular system - MET    Long Term Goals (12 weeks):  - Patient will be independent in a comprehensive home exercise program  - Patient will improve scoring on DGI by 2 6 points from initial score to progress safety - MET  - Patient will improve gait speed by 0 18 m/s from initial score to improve safety with community ambulation - MET  - Patient will improve scoring on FGA by 4 points from initial score  to progress safety with dynamic tasks - MET  - Patient will be able to demonstrate HT in gait without veering  - Patient will improve 6 Minute Walk Test score by 190 feet from initial score to promote improved cardiovascular endurance  - Patient will report 50% reduction in near falls in order to improve safety with functional tasks and reduce his risk for falls  - Patient will report going on walks at least 3 days per week to promote independence and improved cardiovascular endurance  - Patient will be able to ascend/descend stairs reciprocally with 1 UE assist to promote independence and safety with ADLs  - Patient will report 50% reduction in near falls when ambulating on uneven terrain      Cut off score    All date taken from [facility] Neuro Section or Rehab Measures      K[place]/64  MDC: 6 pts  Age Norms:  61-76: M - 54   F - 55  70-79: M - 47   F - 53  80-89: M - 48   F - 50 5xSTS: Destiney et al 2010  MDC: 2 3 sec  Age Norms:  60-69: 11 1 sec  70-79: 12 6 sec  80-89: 14 8 sec   TUG  MDC: 4 14 sec  Cut off score:  >13 5 sec community dwelling adults  >32 2 frail elderly  <20 I for basic transfers  >30 dependent on transfers 10 Meter Walk Test: Aleksey clements 2011  MDC: 0 18 m/s  20-29: M - 1 35 m   F - 1 34 m  30-39: M - 1 43 m   F - 1 34 m  40-49: M - 1 43 m   F - 1 39 m  50-59: M - 1 43 m   F - 1 31 m  60-69: M - 1 34 m   F - 1 24 m  70-79: M - 1 26 m   F - 1 13 m  80-89: M - 0 97 m   F - 0 94 m    Household Ambulator < 0 4 m/s  Limited Community Ambulator 0 4 - 0 8 m/s  [employer] Ambulator 0 8 - 1 2 m/s  Safely cross the street > 1 2 m/s   FGA  MCID: 4 pts  Geriatrics/community < 22/30 fall risk  Geriatrics/community < 20/30 unexplained falls    DGI  MDC: vestibular - 4 pts  MDC: geriatric/community - 3 pts  Falls risk <19/24 mCTSIB  Norm: 20-60 yrs  Eyes open firm: norm sway 0 21-0 48  Eyes closed firm: norm sway 0 48-0 99  Eyes open foam: norm sway 0 38-0 71  Eyes closed foam: norm sway 0 70-2 22   6 Minute Walk Test  MDC: 190 98 ft  MCID: 164 ft    Age Norms  60-69: M - 1876 ft (571 80 m)  F - 1765 ft (537 98 m)  70-79: M - 1729 ft (527 00 m)  F - 1545 ft (470 92 m)  80-89: M - 1368 ft (416 97 m)  F - 1286 ft (391 97 m) ABC: Yahaira Human, 2003  <67% increased risk for falls         Subjective    History of Present Illness  - Mechanism of injury: Patient presents to session with feelings of dizziness/imbalance since summer  Patient cares for  and is worried she will not be able to support him  Symptoms began suddenly  Patient endorses history of sinus trouble and currently uses Flonase daily  Symptoms are unpredictable and are described as sometimes swaying, with feeling as though she may lose her balance  Patient does have history of numbness in feet  Patient denies recent vision changes with most recent appointment in October  Update (2/21/22): Patient reports that she had dizziness "attack" on Friday and she continues to be dizzy with quick movements  She feels she has improved to 70% PLOF  - Primary AD: none  - Assist level at home: Indepedent  - Decreased fine motor tasks: Yes, began around time of dizziness onset       Pain  Occasional headaches, patient suspects sinus related       Social Support  - Steps to enter house: 2 + 1 with B/L railings   - Stairs in house: 2 steps from kitchen to ground level with B/L railings; flight to cellar with gate to prevent falling    - Lives in: house (ranch)  - Lives with:     - Employment status: part time, hair dressing   - Hand dominance: RHD    Treatments  - Previous treatment: went to ENT, was prescribed Flonase   - Current treatment: Physical therapy  - Diagnostic Testing: Audiogram       Objective     UE MMT  - R Shoulder Flexion: 4+/5  L Shoulder Flexion: 4+/5  - R Shoulder Abduction: 4+/5  L Shoulder Abduction: 4+/5  - R Elbow Extension: 4+/5  L Elbow Extension: 4+/5  - R Elbow Flexion: 4+/5  L Elbow Flexion: 4+/5  - R Wrist Flexion: 4+/5   L Wrist Flexion: 4+/5  - R Wrist Extension: 4+/5  L Wrist Extension: 4+/5      LE MMT  - R Hip Flexion: 4/5   L Hip Flexion: 4/5  - R Hip Extension: 4/5   L Hip Extension: 4/5  - R Hip Abduction: 4/5   L Hip Abduction: 4/5  - R Hip Adduction: 4/5   L Hip Adduction: 4/5  - R Knee Extension: 4+/5  L Knee Extension: 4+/5  - R Knee Flexion: 4+/5   L Knee Flexion: 4+/5  - R Ankle DF: 4+/5   L Ankle DF: 4+/5  - R Ankle PF: 4+/5   L Ankle PF: 4+/5    Sensation  - Light touch: Intact  Patient endorses history of numbness/tingling in BLE, L>R; pt suggests related to sciatica       Coordination  - Heel to Shin: Normal  - Alternate Toe Taps: Normal  - Finger to Nose: Normal  - Dysdiadochokinesia: Normal       C/S Screen:   - ROM: Normal all planes  - Alar: (-)  - [place]: (-)   - VBI: (-)     VOMS  - Smooth Pursuits: Normal   - Saccades: Normal but reproduced symptoms   - Convergence: Normal   - VOR cx: Normal but reproduced symptoms, dizziness at worst 5/10   - VOR: Normal, reproduced symptoms   - Head thrust test: Normal     Postural Screen  - Observation: WNL     Palpation  - TTP L upper trap, levator scap     Interventions 2/21:   - Tandem ambulation in // bars, 5 laps   - Carioca in // bars, 2 laps         Outcome Measures Initial Eval  1/20/22 + 1/24/22 PN  2/21/22       5xSTS 21 43 sec, 0UE 10 93 sec, 0UE       TUG NP sec        10 meter 1 13 m/s Self-selected: 1 03 m/s    Top speed: 1 34 m/s       VISHNU NP/56        FGA 13/30 19/30       DGI 14/24 18/24       mCTSIB  - FTEO (firm)  - FTEC (firm)  - FTEO (foam)  - FTEC (foam)   30 sec  28 sec  12 sec  10 sec   30 sec  30 sec (+)  30 sec  30 sec (+)       6MWT 975 ft 1050 ft                                    Precautions: Balance  Past Medical History:   Diagnosis Date    Anxiety     Bleeding from varicose vein     Environmental allergies     Fracture of radius     Obesity     Scoliosis     mild     Vaginitis     Vulvovaginitis

## 2022-02-21 NOTE — PATIENT INSTRUCTIONS
Calcium supplement over the counter - 500 mg daily  Vitamin d prescription  Labs in 10 weeks  If no change symptoms in 4 more weeks, then consider MRI

## 2022-02-24 ENCOUNTER — OFFICE VISIT (OUTPATIENT)
Dept: PULMONOLOGY | Facility: MEDICAL CENTER | Age: 82
End: 2022-02-24
Payer: MEDICARE

## 2022-02-24 VITALS
WEIGHT: 196.6 LBS | RESPIRATION RATE: 12 BRPM | TEMPERATURE: 97.1 F | HEIGHT: 61 IN | HEART RATE: 77 BPM | OXYGEN SATURATION: 97 % | BODY MASS INDEX: 37.12 KG/M2 | SYSTOLIC BLOOD PRESSURE: 122 MMHG | DIASTOLIC BLOOD PRESSURE: 62 MMHG

## 2022-02-24 DIAGNOSIS — J30.1 SEASONAL ALLERGIC RHINITIS DUE TO POLLEN: ICD-10-CM

## 2022-02-24 DIAGNOSIS — J45.20 MILD INTERMITTENT ASTHMA WITHOUT COMPLICATION: Primary | ICD-10-CM

## 2022-02-24 PROCEDURE — 99213 OFFICE O/P EST LOW 20 MIN: CPT | Performed by: INTERNAL MEDICINE

## 2022-02-24 NOTE — ASSESSMENT & PLAN NOTE
She is not have any problem with her allergies at this time    She will use Flonase and over-the-counter antihistamine when needed

## 2022-02-24 NOTE — ASSESSMENT & PLAN NOTE
She is doing well in regard to her asthma  Only needs to use her Atrovent inhaler periodically  No recent exacerbations    She does use her albuterol inhaler periodically but does not need to use more than once a week

## 2022-02-24 NOTE — PROGRESS NOTES
Assessment/Plan        Problem List Items Addressed This Visit        Respiratory    Seasonal allergic rhinitis due to pollen     She is not have any problem with her allergies at this time  She will use Flonase and over-the-counter antihistamine when needed         Mild intermittent asthma without complication - Primary     She is doing well in regard to her asthma  Only needs to use her Atrovent inhaler periodically  No recent exacerbations  She does use her albuterol inhaler periodically but does not need to use more than once a week                 Cc; doing okay      HPI     Nancy has mild intermittent asthma  She also has history moderate GEORGINA but did not want to pursue CPAP therapy  Not have any nocturnal dyspnea or excessive daytime somnolence  She has not been having any recent problems with asthma  She does use periodically  Not having any wheezing at nighttime  Has not had any recent respiratory tract infections  Not having any shortness of breath  She has had some problems with vertigo recently  She has been seen in ENT office and is also going to physical therapy for this  This seems to be helping her  Also states her vitamin-D level was low and she takes of vitamin tablet once a week  She does get some allergy symptoms usually in spring and now more often and fall    She will take Flonase on this starts acting up    Past Medical History:   Diagnosis Date    Anxiety     Bleeding from varicose vein     Environmental allergies     Fracture of radius     Obesity     Scoliosis     mild     Vaginitis     Vulvovaginitis        Past Surgical History:   Procedure Laterality Date    EYE SURGERY      cataract    TUBAL LIGATION           Current Outpatient Medications:     albuterol (Ventolin HFA) 90 mcg/act inhaler, Inhale 2 puffs every 6 (six) hours as needed for wheezing, Disp: 18 g, Rfl: 6    B COMPLEX VITAMINS PO, Take by mouth, Disp: , Rfl:     ergocalciferol (VITAMIN D2) 50,000 units, Take 1 capsule (50,000 Units total) by mouth once a week, Disp: 12 capsule, Rfl: 0    fluticasone (FLONASE) 50 mcg/act nasal spray, 2 sprays into each nostril daily, Disp: 1 g, Rfl: 6    Naproxen Sodium (ALEVE) 220 MG CAPS, Take 1 capsule by mouth daily  , Disp: , Rfl:     zinc gluconate 50 mg tablet, Take 50 mg by mouth daily, Disp: , Rfl:     aspirin 81 mg chewable tablet, Chew 1 tablet daily (Patient not taking: Reported on 2/21/2022 ), Disp: , Rfl:     nystatin-triamcinolone (MYCOLOG-II) cream, Apply topically as needed   (Patient not taking: Reported on 2/21/2022 ), Disp: , Rfl:     Allergies   Allergen Reactions    Penicillins     Sulfa Antibiotics        Social History     Tobacco Use    Smoking status: Never Smoker    Smokeless tobacco: Never Used   Substance Use Topics    Alcohol use: Yes     Comment: social          Family History   Problem Relation Age of Onset    Colon cancer Maternal Aunt     Stroke Mother        Review of Systems   Constitutional: Negative for chills, fever and unexpected weight change  HENT: Negative for congestion, rhinorrhea and sore throat  Eyes: Negative for discharge and redness  Respiratory: Negative for shortness of breath  Cardiovascular: Negative for chest pain, palpitations and leg swelling  Gastrointestinal: Negative for abdominal distention, abdominal pain and nausea  Endocrine: Negative for polydipsia and polyphagia  Genitourinary: Negative for dysuria  Musculoskeletal: Negative for joint swelling and myalgias  Skin: Negative for rash  Neurological:        Has been having some vertigo recently   Psychiatric/Behavioral: Negative for confusion  Height 5 ft 1 in tall weight 196 lb BMI 37 15    Vitals:    02/24/22 0841   BP: 122/62   Pulse: 77   Resp: 12   Temp: (!) 97 1 °F (36 2 °C)   SpO2: 97%           Physical Exam  Vitals reviewed  Constitutional:       General: She is not in acute distress       Appearance: Normal appearance  She is well-developed  HENT:      Head: Normocephalic  Right Ear: External ear normal       Left Ear: External ear normal       Nose: Nose normal       Mouth/Throat:      Mouth: Mucous membranes are moist       Pharynx: Oropharynx is clear  No oropharyngeal exudate  Eyes:      Conjunctiva/sclera: Conjunctivae normal       Pupils: Pupils are equal, round, and reactive to light  Cardiovascular:      Rate and Rhythm: Normal rate and regular rhythm  Heart sounds: Normal heart sounds  Pulmonary:      Effort: Pulmonary effort is normal       Comments: Lung sounds are clear  No wheezes crackles or rhonchi  Abdominal:      General: There is no distension  Palpations: Abdomen is soft  Tenderness: There is no abdominal tenderness  Musculoskeletal:      Cervical back: Neck supple  Comments: No edema, cyanosis or clubbing   Lymphadenopathy:      Cervical: No cervical adenopathy  Skin:     General: Skin is warm and dry  Neurological:      Mental Status: She is alert and oriented to person, place, and time     Psychiatric:         Mood and Affect: Mood normal          Behavior: Behavior normal

## 2022-02-25 ENCOUNTER — APPOINTMENT (OUTPATIENT)
Dept: PHYSICAL THERAPY | Facility: CLINIC | Age: 82
End: 2022-02-25
Payer: MEDICARE

## 2022-02-28 ENCOUNTER — OFFICE VISIT (OUTPATIENT)
Dept: PHYSICAL THERAPY | Facility: CLINIC | Age: 82
End: 2022-02-28
Payer: MEDICARE

## 2022-02-28 DIAGNOSIS — R26.89 IMBALANCE: Primary | ICD-10-CM

## 2022-02-28 PROCEDURE — 97112 NEUROMUSCULAR REEDUCATION: CPT

## 2022-02-28 NOTE — PROGRESS NOTES
Daily Note           Insurance:  [place]/CMS Eval/ Re-eval POC expires Sherry Umana #/ Referral # Total   Visits  Start date  Expiration date Extension  Visit limitation? PT only or  PT+OT? Co-Insurance   Medicare: CMS 22  Not required NA NA NA NA NA PT No    22                                                             Today's date: 2022  Patient name: Michael Posadas  : 1940  MRN: [id number]  Referring provider: MARIPOSA Justice  Dx:   Encounter Diagnosis     ICD-10-CM    1  Imbalance  R26 89                   Subjective: Patient reports overall improvement in dizziness symptoms  No change in status or complaint  Objective: See treatment diary below    NMR:   - Ambulation with eyes closed, 50' x 4  - Sidestepping on foam, 10' x 6  - Tandem ambulation on foam, 10' x 8  - 9" hurdles w/ intermittent foam, 4 hurdles x 10 FWD  - 6" hurdles w/ intermittent foam, 4 hurdles x 3 laps LAT   - Side stepping on foam to blaze pod taps on varied height river rocks, 5 0 sec reaction time, 3 minutes total  - FWD/BACK with posterior resistance (red), 10x   - Gait w/ head turns, 50' x 4  - Tandem fwd/backward on firm, 10' x 4 cycles       HEP Issued 2/10:  Access Code: [email or web address]  URL: [email or web address]  Date: 02/10/2022  Prepared by: Nadege Minor    Exercises  · Standing with Head Rotation - 1 x daily - 7 x weekly - 2 sets - 20 reps  · Romberg Stance with Eyes Closed - 1 x daily - 7 x weekly - 3 reps - 30 seconds hold  · Standing Gaze Stabilization with Head Rotation - 1 x daily - 7 x weekly - 3 reps - 1 minute hold  · Seated VOR Cancellation - 1 x daily - 7 x weekly - 2 sets - 20 reps      Assessment: Patient tolerated session well today with focus on dynamic balance to improve feelings of dysequilibrium  Patient demonstrated no overt LOB today although utilized ankle strategy on foam intermittently   Patient able to perform ambulation with eyes closed and with head turns with minimal path deviation today  Patient will benefit from continued skilled therapy to decrease dizziness symptoms and improve balance for return to PLOF  Plan: Continue per plan of care  Progress treatment as tolerated              Outcome Measures Initial Eval  1/20/22 + 1/24/22 PN  2/21/22       5xSTS 21 43 sec, 0UE 10 93 sec, 0UE       TUG NP sec        10 meter 1 13 m/s Self-selected: 1 03 m/s    Top speed: 1 34 m/s       VISHNU NP/56        FGA 13/30 19/30       DGI 14/24 18/24       mCTSIB  - FTEO (firm)  - FTEC (firm)  - FTEO (foam)  - FTEC (foam)   30 sec  28 sec  12 sec  10 sec   30 sec  30 sec (+)  30 sec  30 sec (+)       6MWT 975 ft 1050 ft

## 2022-03-03 ENCOUNTER — OFFICE VISIT (OUTPATIENT)
Dept: PHYSICAL THERAPY | Facility: CLINIC | Age: 82
End: 2022-03-03
Payer: MEDICARE

## 2022-03-03 DIAGNOSIS — R26.89 IMBALANCE: Primary | ICD-10-CM

## 2022-03-03 PROCEDURE — 97140 MANUAL THERAPY 1/> REGIONS: CPT

## 2022-03-03 PROCEDURE — 97112 NEUROMUSCULAR REEDUCATION: CPT

## 2022-03-03 PROCEDURE — 97530 THERAPEUTIC ACTIVITIES: CPT

## 2022-03-03 NOTE — PROGRESS NOTES
Daily Note           Insurance:  A/CMS Eval/ Re-eval POC expires Cindy Anderson #/ Referral # Total   Visits  Start date  Expiration date Extension  Visit limitation? PT only or  PT+OT? Co-Insurance   Medicare: CMS 22  Not required NA NA NA NA NA PT No    22                                                             Today's date: 3/3/2022  Patient name: Ree Kumar  : 1940  MRN: [id number]  Referring provider: MARIPOSA Santacruz  Dx:   Encounter Diagnosis     ICD-10-CM    1  Imbalance  R26 89                   Subjective: Patient reports she woke up feeling off this morning  Objective: See treatment diary below    NMR:   - VOR cx: 60 sec each   H: 3-4/10 D   V: 0/10 D   CW: 2/10 D   CCW: 2/10 D  - FTEO foam, 30"  - FTEC foam, 10" x 6  - FTEO foam w/ horizontal head turns, 20x  - VOR x 1, plain background   H: 60 sec, 1-2/10   V: 60 sec, 1-210   - Upper trap stretch, 30" B/L   - Chin tucks, 20x w/ 3" iso hold    - Scapular squeezes, 15x     Manual: TPR to L and R upper traps     TA:  JPET: WNL for flexion, extension, rotation     Updated HEP 3/3:   Access Code: [email or web address]  URL: [email or web address]  Date: 2022  Prepared by: Woody Alex    Exercises  · Seated Levator Scapulae Stretch - 1 x daily - 7 x weekly - 2 reps - 30 seconds hold  · Seated Cervical Retraction - 1 x daily - 7 x weekly - 20 reps - 3 seconds hold  · Seated Scapular Retraction - 1 x daily - 7 x weekly - 20 reps - 2 seconds hold      HEP Issued 2/10:  Access Code: [email or web address]  URL: [email or web address]  Date: 02/10/2022  Prepared by: Woody Alex    Exercises  · Standing with Head Rotation - 1 x daily - 7 x weekly - 2 sets - 20 reps  · Romberg Stance with Eyes Closed - 1 x daily - 7 x weekly - 3 reps - 30 seconds hold  · Standing Gaze Stabilization with Head Rotation - 1 x daily - 7 x weekly - 3 reps - 1 minute hold  · Seated VOR Cancellation - 1 x daily - 7 x weekly - 2 sets - 20 reps      Assessment: Patient tolerated session well today  Due to recurrence of dizziness this morning, spent beginning of session focusing on VOR x1 and VOR cx to adapt vestibular system  Patient complained of neck "clicking" which impacted ability to complete exercises requiring quick movements of head and neck  Completed TPR to hypertonic upper traps after which patient reported mild relief  Issued new postural HEP to maximize ability to perform vestibular exercises in session  Patient will benefit from continued skilled therapy to decrease dizziness symptoms and improve balance for return to PLOF  Plan: Continue per plan of care  Progress treatment as tolerated              Outcome Measures Initial Eval  1/20/22 + 1/24/22 PN  2/21/22       5xSTS 21 43 sec, 0UE 10 93 sec, 0UE       TUG NP sec        10 meter 1 13 m/s Self-selected: 1 03 m/s    Top speed: 1 34 m/s       VISHNU NP/56        FGA 13/30 19/30       DGI 14/24 18/24       mCTSIB  - FTEO (firm)  - FTEC (firm)  - FTEO (foam)  - FTEC (foam)   30 sec  28 sec  12 sec  10 sec   30 sec  30 sec (+)  30 sec  30 sec (+)       6MWT 975 ft 1050 ft

## 2022-03-07 ENCOUNTER — OFFICE VISIT (OUTPATIENT)
Dept: PHYSICAL THERAPY | Facility: CLINIC | Age: 82
End: 2022-03-07
Payer: MEDICARE

## 2022-03-07 DIAGNOSIS — R26.89 IMBALANCE: Primary | ICD-10-CM

## 2022-03-07 PROCEDURE — 97112 NEUROMUSCULAR REEDUCATION: CPT

## 2022-03-07 NOTE — PROGRESS NOTES
Daily Note           Insurance:  A/CMS Eval/ Re-eval POC expires Gregg Medina #/ Referral # Total   Visits  Start date  Expiration date Extension  Visit limitation? PT only or  PT+OT? Co-Insurance   Medicare: CMS 22  Not required NA NA NA NA NA PT No    22                                                             Today's date: 3/7/2022  Patient name: Zoe Loya  : 1940  MRN: [id number]  Referring provider: MARIPOSA Dowd  Dx:   Encounter Diagnosis     ICD-10-CM    1  Imbalance  R26 89                   Subjective: Patient reports reduction in dizziness since prior session  Objective: See treatment diary below    NMR:   - VOR cx: 60 sec each, busy background   H: 0/10 D   V: 0/10 D   CW: 0/10 D   CCW: 2/10 D  - FTEO foam, 60"  - FTEC foam, 30" x 2  - VOR x 1, busy background   H: 60 sec, 3/10   V: 60 sec, 0/10    H: 60 sec, 0/10 (no glasses)  - Ambulation w/ eyes closed, 50' x 4   - Tandem ambulation, 40' x 3   - Four square hurdles, 3 minutes, random direction changes, 6" hurdles   - Side stepping on foam, 10' x 5 laps  - Alternating taps to 4" step from airex, 20x     Reviewed HEP today    Updated HEP 3/3:   Access Code: [email or web address]  URL: [email or web address]  Date: 2022  Prepared by: Leann Signs    Exercises  · Seated Levator Scapulae Stretch - 1 x daily - 7 x weekly - 2 reps - 30 seconds hold  · Seated Cervical Retraction - 1 x daily - 7 x weekly - 20 reps - 3 seconds hold  · Seated Scapular Retraction - 1 x daily - 7 x weekly - 20 reps - 2 seconds hold      HEP Issued 2/10:  Access Code: [email or web address]  URL: [email or web address]/  Date: 02/10/2022  Prepared by: Leann Signs    Exercises  · Standing with Head Rotation - 1 x daily - 7 x weekly - 2 sets - 20 reps  · Romberg Stance with Eyes Closed - 1 x daily - 7 x weekly - 3 reps - 30 seconds hold  · Standing Gaze Stabilization with Head Rotation - 1 x daily - 7 x weekly - 3 reps - 1 minute hold  · Seated VOR Cancellation - 1 x daily - 7 x weekly - 2 sets - 20 reps      Assessment: Patient tolerated session well today with overall reduction of dizziness elicited throughout session  Patient able to perform full 30 seconds albeit with sway compared to 10" previously  Patient demonstrated frequent ankle strategy on foam, suggesting reliance on somatosensory input  Patient also demonstrated significant path deviation when ambulating with eyes closed, suggesting reliance on visual system as well  Patient will benefit from continued skilled therapy to decrease dizziness symptoms and improve balance for return to PLOF  Plan: Continue per plan of care  Progress treatment as tolerated              Outcome Measures Initial Eval  1/20/22 + 1/24/22 PN  2/21/22       5xSTS 21 43 sec, 0UE 10 93 sec, 0UE       TUG NP sec        10 meter 1 13 m/s Self-selected: 1 03 m/s    Top speed: 1 34 m/s       VISHNU NP/56        FGA 13/30 19/30       DGI 14/24 18/24       mCTSIB  - FTEO (firm)  - FTEC (firm)  - FTEO (foam)  - FTEC (foam)   30 sec  28 sec  12 sec  10 sec   30 sec  30 sec (+)  30 sec  30 sec (+)       6MWT 975 ft 1050 ft

## 2022-03-10 ENCOUNTER — OFFICE VISIT (OUTPATIENT)
Dept: PHYSICAL THERAPY | Facility: CLINIC | Age: 82
End: 2022-03-10
Payer: MEDICARE

## 2022-03-10 DIAGNOSIS — R26.89 IMBALANCE: Primary | ICD-10-CM

## 2022-03-10 PROCEDURE — 97112 NEUROMUSCULAR REEDUCATION: CPT | Performed by: PHYSICAL THERAPIST

## 2022-03-10 NOTE — PROGRESS NOTES
Daily Note           Insurance:  A/CMS Eval/ Re-eval POC expires Saurabh Crystal #/ Referral # Total   Visits  Start date  Expiration date Extension  Visit limitation? PT only or  PT+OT? Co-Insurance   Medicare: CMS 22  Not required NA NA NA NA NA PT No    22                                                             Today's date: 3/10/2022  Patient name: Kinga Rhodes  : 1940  MRN: [id number]  Referring provider: MARIPOSA Brock  Dx:   Encounter Diagnosis     ICD-10-CM    1  Imbalance  R26 89        Start Time: 780  Stop Time: 845  Total time in clinic (min): 35 minutes    Subjective: Patient reports she is feeling a bit tired today, but no increase in dizziness since last session  Objective: See treatment diary below    NMR:   - VOR cx: 90 sec each, busy background   H: 2/10 D   V: 2/10 D   CW: 3/10 D   CCW: 2/10 D  - FTEO foam, 60"  - FTEC foam, 45" x 2  - VOR x 1, busy background   H: 90 sec, 2/10   V: 90 sec, 010    H: 90 sec, 10 (no glasses)  - Ambulation w/ eyes closed, 50' x 4   - Tandem ambulation, 40' x 3   - Four square hurdles, 3 minutes, random direction changes, 6" hurdles   - Side stepping on foam, 10' x 5 laps  - Alternating taps to 6" step from airex, 25x     Reviewed HEP today    Updated HEP 3/3:   Access Code: [email or web address]  URL: [email or web address]  Date: 2022  Prepared by: Graeme Maguire    Exercises  · Seated Levator Scapulae Stretch - 1 x daily - 7 x weekly - 2 reps - 30 seconds hold  · Seated Cervical Retraction - 1 x daily - 7 x weekly - 20 reps - 3 seconds hold  · Seated Scapular Retraction - 1 x daily - 7 x weekly - 20 reps - 2 seconds hold      HEP Issued 2/10:  Access Code: [email or web address]  URL: [email or web address]  Date: 02/10/2022  Prepared by: Graeme Maguire    Exercises  · Standing with Head Rotation - 1 x daily - 7 x weekly - 2 sets - 20 reps  · Romberg Stance with Eyes Closed - 1 x daily - 7 x weekly - 3 reps - 30 seconds hold  · Standing Gaze Stabilization with Head Rotation - 1 x daily - 7 x weekly - 3 reps - 1 minute hold  · Seated VOR Cancellation - 1 x daily - 7 x weekly - 2 sets - 20 reps      Assessment: Patient tolerated session well today with overall reduction of dizziness elicited throughout session  Patient tolerated progression on VORx1 and VOR cx to 90 seconds with minimal increase in dizziness (0-3/10)  Patient demonstrates improved performance with FTEO on foam but continues to demonstrate mild postural sway with use of ankle strategy to maintain balance with FTEC on foam, patient was able to progress to 45 seconds with FTEC, indicating improved overall balance  Patient demonstrated significantly less path deviation with eyes closed ambulation compared to previous session, indicating decreased reliance on visual input  Patient will continue to benefit from continued skilled therapy to decrease dizziness symptoms and improve balance for return to PLOF  Plan: Continue per plan of care  Progress treatment as tolerated              Outcome Measures Initial Eval  1/20/22 + 1/24/22 PN  2/21/22       5xSTS 21 43 sec, 0UE 10 93 sec, 0UE       TUG NP sec        10 meter 1 13 m/s Self-selected: 1 03 m/s    Top speed: 1 34 m/s       VISHNU NP/56        FGA 13/30 19/30       DGI 14/24 18/24       mCTSIB  - FTEO (firm)  - FTEC (firm)  - FTEO (foam)  - FTEC (foam)   30 sec  28 sec  12 sec  10 sec   30 sec  30 sec (+)  30 sec  30 sec (+)       6MWT 975 ft 1050 ft

## 2022-03-14 ENCOUNTER — OFFICE VISIT (OUTPATIENT)
Dept: PHYSICAL THERAPY | Facility: CLINIC | Age: 82
End: 2022-03-14
Payer: MEDICARE

## 2022-03-14 DIAGNOSIS — R26.89 IMBALANCE: Primary | ICD-10-CM

## 2022-03-14 PROCEDURE — 97112 NEUROMUSCULAR REEDUCATION: CPT

## 2022-03-14 NOTE — PROGRESS NOTES
Daily Note           Insurance:  A/CMS Eval/ Re-eval POC expires Marika Santacruz #/ Referral # Total   Visits  Start date  Expiration date Extension  Visit limitation? PT only or  PT+OT? Co-Insurance   Medicare: CMS 22  Not required NA NA NA NA NA PT No    22                                                             Today's date: 3/14/2022  Patient name: Kath Ulrich  : 1940  MRN: [id number]  Referring provider: MARIPOSA Hess  Dx:   Encounter Diagnosis     ICD-10-CM    1  Imbalance  R26 89                   Subjective: Patient reports she is feeling better overall  Objective: See treatment diary below    NMR:   - VOR cx: 90 sec each, busy background   H: 1/10 D   V: 2/10 D   CW: 1/10 D   CCW: 1/10 D  - FTEO foam, 60"  - FTEC foam, 45" x 2  - Fwd/back with posterior resistance (green) x 15  - Side stepping to cone taps w/ posterior resistance (green)  - VOR x 1, busy background   H: 90 sec, 1-2/10   V: 90 sec, 1-2/10    H: 60 sec, FT on foam, 1/10    V: 60 sec, FT on foam, 10  - Side stepping on foam, 10' x 5 laps      Updated HEP 3/3:   Access Code: [email or web address]  URL: [email or web address]  Date: 2022  Prepared by: Lugene Confer    Exercises  · Seated Levator Scapulae Stretch - 1 x daily - 7 x weekly - 2 reps - 30 seconds hold  · Seated Cervical Retraction - 1 x daily - 7 x weekly - 20 reps - 3 seconds hold  · Seated Scapular Retraction - 1 x daily - 7 x weekly - 20 reps - 2 seconds hold      HEP Issued 2/10:  Access Code: [email or web address]  URL: [email or web address]  Date: 02/10/2022  Prepared by: Lugene Confer    Exercises  · Standing with Head Rotation - 1 x daily - 7 x weekly - 2 sets - 20 reps  · Romberg Stance with Eyes Closed - 1 x daily - 7 x weekly - 3 reps - 30 seconds hold  · Standing Gaze Stabilization with Head Rotation - 1 x daily - 7 x weekly - 3 reps - 1 minute hold  · Seated VOR Cancellation - 1 x daily - 7 x weekly - 2 sets - 20 reps      Assessment: Patient tolerated session well today  Patient experienced less dizziness throughout session today particularly within visually stimulating background  Patient able to perform exercises with posterior resistance with occasional hip strategy utilized to independently maintain balance  Patient will continue to benefit from continued skilled therapy to decrease dizziness symptoms and improve balance for return to PLOF  Plan: Continue per plan of care  Progress treatment as tolerated              Outcome Measures Initial Eval  1/20/22 + 1/24/22 PN  2/21/22       5xSTS 21 43 sec, 0UE 10 93 sec, 0UE       TUG NP sec        10 meter 1 13 m/s Self-selected: 1 03 m/s    Top speed: 1 34 m/s       VISHNU NP/56        FGA 13/30 19/30       DGI 14/24 18/24       mCTSIB  - FTEO (firm)  - FTEC (firm)  - FTEO (foam)  - FTEC (foam)   30 sec  28 sec  12 sec  10 sec   30 sec  30 sec (+)  30 sec  30 sec (+)       6MWT 975 ft 1050 ft

## 2022-03-17 ENCOUNTER — OFFICE VISIT (OUTPATIENT)
Dept: PHYSICAL THERAPY | Facility: CLINIC | Age: 82
End: 2022-03-17
Payer: MEDICARE

## 2022-03-17 DIAGNOSIS — R26.89 IMBALANCE: Primary | ICD-10-CM

## 2022-03-17 PROCEDURE — 97112 NEUROMUSCULAR REEDUCATION: CPT

## 2022-03-17 NOTE — PROGRESS NOTES
Daily Note           Insurance:  [place]/CMS Eval/ Re-eval POC expires Deborah Favor #/ Referral # Total   Visits  Start date  Expiration date Extension  Visit limitation? PT only or  PT+OT? Co-Insurance   Medicare: CMS 22  Not required NA NA NA NA NA PT No    22                                                             Today's date: 3/17/2022  Patient name: Zaria Kothari  : 1940  MRN: [id number]  Referring provider: MARIPOSA Brown  Dx:   Encounter Diagnosis     ICD-10-CM    1  Imbalance  R26 89                   Subjective: Patient reports she has been busy and feeling "not too bad " Patient states she thinks her body is getting better, but her head still has problems  Objective: See treatment diary below    NMR:   - Scapular squeezes, 20x w/ 3 second isometric hold   - Upper trap stretch, 30 seconds x 2 bilaterally   - VOR x 1, busy background   H: 90 sec, FT on foam, 0/10   V: 90 sec, FT on foam, 0/10    H: 120 sec, FT on foam, 0/10, mild sway    V: 120 sec, FT on foam, 0/10  - VOR cx: 90 sec each, busy background, FT on foam   H: 2/10 D   V: /10 D   CW: 2/10 D   CCW: 1/10 D  - FTEC foam, 45"   - Head turns on foam, EO, 20x  - Head turns on foam, EC, 20x   - Fwd/back with posterior resistance (green) x 15      Updated HEP 3/3:   Access Code: [email or web address]  URL: [email or web address]  Date: 2022  Prepared by: Alexandra Morrison    Exercises  · Seated Levator Scapulae Stretch - 1 x daily - 7 x weekly - 2 reps - 30 seconds hold  · Seated Cervical Retraction - 1 x daily - 7 x weekly - 20 reps - 3 seconds hold  · Seated Scapular Retraction - 1 x daily - 7 x weekly - 20 reps - 2 seconds hold      HEP Issued 2/10:  Access Code: [email or web address]  URL: [email or web address]  Date: 02/10/2022  Prepared by: Sherrin Spry    Exercises  · Standing with Head Rotation - 1 x daily - 7 x weekly - 2 sets - 20 reps  · Romberg Stance with Eyes Closed - 1 x daily - 7 x weekly - 3 reps - 30 seconds hold  · Standing Gaze Stabilization with Head Rotation - 1 x daily - 7 x weekly - 3 reps - 1 minute hold  · Seated VOR Cancellation - 1 x daily - 7 x weekly - 2 sets - 20 reps      Assessment: Patient tolerated session well today  Did not elicit dizziness with VOR x 1 today  Mild dizziness with VOR cx in busy background on foam; will continue to progress vestibular interventions  Patient challenged when utilizing all balance system in HT on foam with EC as shown by moderate sway  Plan to shift to higher level balance interventions in upcoming sessions as symptoms continue to resolve  Patient will continue to benefit from continued skilled therapy to decrease dizziness symptoms and improve balance for return to PLOF  Plan: Continue per plan of care  Progress treatment as tolerated              Outcome Measures Initial Eval  1/20/22 + 1/24/22 PN  2/21/22       5xSTS 21 43 sec, 0UE 10 93 sec, 0UE       TUG NP sec        10 meter 1 13 m/s Self-selected: 1 03 m/s    Top speed: 1 34 m/s       VISHNU NP/56        FGA 13/30 19/30       DGI 14/24 18/24       mCTSIB  - FTEO (firm)  - FTEC (firm)  - FTEO (foam)  - FTEC (foam)   30 sec  28 sec  12 sec  10 sec   30 sec  30 sec (+)  30 sec  30 sec (+)       6MWT 975 ft 1050 ft

## 2022-03-21 ENCOUNTER — EVALUATION (OUTPATIENT)
Dept: PHYSICAL THERAPY | Facility: CLINIC | Age: 82
End: 2022-03-21
Payer: MEDICARE

## 2022-03-21 DIAGNOSIS — R26.89 IMBALANCE: Primary | ICD-10-CM

## 2022-03-21 PROCEDURE — 97530 THERAPEUTIC ACTIVITIES: CPT

## 2022-03-21 NOTE — PROGRESS NOTES
PT Re-Evaluation / Progress Note         Insurance:  [place]/CMS Eval/ Re-eval POC expires [place] #/ Referral # Total   Visits  Start date  Expiration date Extension  Visit limitation? PT only or  PT+OT? Co-Insurance   Medicare: CMS 22  Not required NA NA NA NA NA PT No    2/21/22 4/14/22             3/21/22 4/14/22                                                 Today's date: 3/21/2022  Patient name: Ayaka Jacome  : 1940  MRN: [id number]  Referring provider: MARIPOSA Neff  Dx:   Encounter Diagnosis     ICD-10-CM    1  Imbalance  R26 89          Assessment  Assessment details: Patient is a [de-identified] y o  Female who has been attending skilled outpatient PT for 2 months due to dizziness and feelings of imbalance  Over the past month patient has demonstrated subjective and objective progress toward goals  Patient reports overall reduction in dizziness and improvement in balance which is integral to caring for   Since last PN, patient demonstrated relative plateau with 5x STS at time of 10 73 seconds which is below cutoff for fall risk  Patient demonstrated small, statistically insignificant improvement in cardiovascular endurance per increase in distance ambulated in 6MWT of 60 feet to 1110 feet  Patient did demonstrate significant improvement in dynamic balance per improvement in DGI to  and FGA to ; both values deem patient at low risk of falls  Patient was able to perform all conditions of mCTSIB with minimal or no sway today, further suggesting improvement in balance  Patient has met all short term goals and 5/10 long term goals at this time  Plan to continue PT for 2 more weeks and transition to HEP to maintain gains in PT; patient in agreement with plan   Patient will benefit from continued skilled therapy to improve static and dynamic balance and resolve symptoms of dizziness in order to achieve maximal daily function and participation in ADLs  Impairments: Impaired balance, Impaired physical strength, Lacks appropriate HEP and Safety issue  Understanding of Dx/Px/POC: Good  Prognosis: Good    Patient verbalized understanding of POC  Please contact me if you have any questions or recommendations  Thank you for the referral and the opportunity to share in Kathy Anderson's care          Plan  Plan details: Skilled PT    Patient would benefit from: Skilled PT  Planned modality interventions: Biofeedback  Planned therapy interventions: Balance, Functional ROM exercises, Gait training, HEP, Manual therapy, Motor coordination training, Neuromuscular re-education, Patient education, Strengthening, Stretching, Therapeutic activities and Therapeutic exercises  Frequency: 2x/wk  Duration in weeks: 12  Plan of Care beginning date: 1/20/22  Plan of Care expiration date: 12 weeks - 4/14/22  Treatment plan discussed with: Patient       Goals  Short Term Goals (4 weeks):    - Patient will be independent in basic HEP 2-3 weeks - MET  - Patient will improve 5xSTS score by 2 3 seconds from initial score to promote improved LE functional strength needed for ADLs - MET  - Patient will complete FGA and DGI to assess dynamic balance - MET  - Patient will perform 6MWT to assess endurance - MET  - Patient will perform 10mWT to assess gait speed - MET  - Patient will report 50% reduction in symptoms - MET  - Patient will be able to tolerate VOR x 1 for 1 minute to suggest improved function of vestibular system - MET    Long Term Goals (12 weeks):  - Patient will be independent in a comprehensive home exercise program - ongoing  - Patient will improve scoring on DGI by 2 6 points from initial score to progress safety - MET  - Patient will improve gait speed by 0 18 m/s from initial score to improve safety with community ambulation - MET  - Patient will improve scoring on FGA by 4 points from initial score  to progress safety with dynamic tasks - MET  - Patient will be able to demonstrate HT in gait without veering - MET  - Patient will improve 6 Minute Walk Test score by 190 feet from initial score to promote improved cardiovascular endurance - NOT MET, progressing  - Patient will report 50% reduction in near falls in order to improve safety with functional tasks and reduce his risk for falls - MET  - Patient will report going on walks at least 3 days per week to promote independence and improved cardiovascular endurance - ongoing   - Patient will be able to ascend/descend stairs reciprocally with 1 UE assist to promote independence and safety with ADLs - progressing  - Patient will report 50% reduction in near falls when ambulating on uneven terrain - progressing, avoids uneven terrain       Cut off score    All date taken from [facility] Neuro Section or Rehab Measures      [place]/64  MDC: 6 pts  Age Norms:  61-76: M - 54   F - 55  70-79: M - 47   F - 53  80-89: M - 48   F - 50 5xSTS: Destiney et al 2010  MDC: 2 3 sec  Age Norms:  60-69: 11 1 sec  70-79: 12 6 sec  80-89: 14 8 sec   TUG  MDC: 4 14 sec  Cut off score:  >13 5 sec community dwelling adults  >32 2 frail elderly  <20 I for basic transfers  >30 dependent on transfers 10 Meter Walk Test: Leigh Ann Rayaper al 2011  MDC: 0 18 m/s  20-29: M - 1 35 m   F - 1 34 m  30-39: M - 1 43 m   F - 1 34 m  40-49: M - 1 43 m   F - 1 39 m  50-59: M - 1 43 m   F - 1 31 m  60-69: M - 1 34 m   F - 1 24 m  70-79: M - 1 26 m   F - 1 13 m  80-89: M - 0 97 m   F - 0 94 m    Household Ambulator < 0 4 m/s  Limited Community Ambulator 0 4 - 0 8 m/s  [employer] 0 8 - 1 2 m/s  Safely cross the street > 1 2 m/s   FGA  MCID: 4 pts  Geriatrics/community < 22/30 fall risk  Geriatrics/community < 20/30 unexplained falls    DGI  MDC: vestibular - 4 pts  MDC: geriatric/community - 3 pts  Falls risk <19/24 mCTSIB  Norm: 20-60 yrs  Eyes open firm: norm sway 0 21-0 48  Eyes closed firm: norm sway 0 48-0 99  Eyes open foam: norm sway 0 38-0 71  Eyes closed foam: norm sway 0 70-2 22   6 Minute Walk Test  MDC: 190 98 ft  MCID: 164 ft    Age Norms  61-76: [address] ft (571 80 m)  F - 6480 ft (537 98 m)  70-79: M - 1729 ft (527 00 m)  F - 1545 ft (470 92 m)  80-89: M - 1368 ft (416 97 m)  F - 1286 ft (391 97 m) ABC: Aleksandar Coleman, 2003  <67% increased risk for falls         Subjective    History of Present Illness  - Mechanism of injury: Patient presents to session with feelings of dizziness/imbalance since summer  Patient cares for  and is worried she will not be able to support him  Symptoms began suddenly  Patient endorses history of sinus trouble and currently uses Flonase daily  Symptoms are unpredictable and are described as sometimes swaying, with feeling as though she may lose her balance  Patient does have history of numbness in feet  Patient denies recent vision changes with most recent appointment in October  Update (2/21/22): Patient reports that she had dizziness "attack" on Friday and she continues to be dizzy with quick movements  She feels she has improved to 70% PLOF  Update (3/21/22): Patient reports feeling close to where she should be  Dizziness is better than it was, but it is still there  - Primary AD: none  - Assist level at home: Indepedent  - Decreased fine motor tasks: Yes, began around time of dizziness onset       Pain  Occasional headaches, patient suspects sinus related       Social Support  - Steps to enter house: 2 + 1 with B/L railings   - Stairs in house: 2 steps from kitchen to ground level with B/L railings; flight to cellar with gate to prevent falling    - Lives in: house (ranch)  - Lives with:     - Employment status: part time, hair dressing   - Hand dominance: RHD    Treatments  - Previous treatment: went to ENT, was prescribed Flonase   - Current treatment: Physical therapy  - Diagnostic Testing: Audiogram       Objective     UE MMT  - R Shoulder Flexion: 4+/5  L Shoulder Flexion: 4+/5  - R Shoulder Abduction: 4+/5  L Shoulder Abduction: 4+/5  - R Elbow Extension: 4+/5  L Elbow Extension: 4+/5  - R Elbow Flexion: 4+/5  L Elbow Flexion: 4+/5  - R Wrist Flexion: 4+/5   L Wrist Flexion: 4+/5  - R Wrist Extension: 4+/5  L Wrist Extension: 4+/5      LE MMT  - R Hip Flexion: 4/5   L Hip Flexion: 4/5  - R Hip Extension: 4/5   L Hip Extension: 4/5  - R Hip Abduction: 4/5   L Hip Abduction: 4/5  - R Hip Adduction: 4/5   L Hip Adduction: 4/5  - R Knee Extension: 4+/5  L Knee Extension: 4+/5  - R Knee Flexion: 4+/5   L Knee Flexion: 4+/5  - R Ankle DF: 4+/5   L Ankle DF: 4+/5  - R Ankle PF: 4+/5   L Ankle PF: 4+/5    Sensation  - Light touch: Intact  Patient endorses history of numbness/tingling in BLE, L>R; pt suggests related to sciatica       Coordination  - Heel to Shin: Normal  - Alternate Toe Taps: Normal  - Finger to Nose: Normal  - Dysdiadochokinesia: Normal       C/S Screen:   - ROM: Normal all planes  - Alar: (-)  - [place]: (-)   - VBI: (-)     VOMS  - Smooth Pursuits: Normal   - Saccades: Normal   - Convergence: Normal   - VOR cx: Normal   - VOR: Normal, reproduced symptoms   - Head thrust test: Normal     Postural Screen  - Observation: WNL     Palpation  - TTP L upper trap, levator scap            Outcome Measures Initial Eval  1/20/22 + 1/24/22 PN  2/21/22 PN  3/21/22      5xSTS 21 43 sec, 0UE 10 93 sec, 0UE 10 73 sec, 0UE      TUG NP sec        10 meter 1 13 m/s Self-selected: 1 03 m/s    Top speed: 1 34 m/s Self-selected: 1 25 m/s      VISHNU NP/56        FGA 13/30 19/30 24/30      DGI 14/24 18/24 22/24      mCTSIB  - FTEO (firm)  - FTEC (firm)  - FTEO (foam)  - FTEC (foam)   30 sec  28 sec  12 sec  10 sec   30 sec  30 sec (+)  30 sec  30 sec (+)   30 sec  30 sec   30 sec  30 sec       6MWT 975 ft 1050 ft  1110 ft                                  Precautions: Balance  Past Medical History:   Diagnosis Date    Anxiety     Bleeding from varicose vein     Environmental allergies     Fracture of radius     Obesity     Scoliosis     mild     Vaginitis     Vulvovaginitis

## 2022-03-24 ENCOUNTER — OFFICE VISIT (OUTPATIENT)
Dept: PHYSICAL THERAPY | Facility: CLINIC | Age: 82
End: 2022-03-24
Payer: MEDICARE

## 2022-03-24 DIAGNOSIS — R26.89 IMBALANCE: Primary | ICD-10-CM

## 2022-03-24 PROCEDURE — 97112 NEUROMUSCULAR REEDUCATION: CPT

## 2022-03-24 NOTE — PROGRESS NOTES
Daily Note             Insurance:  A/CMS Eval/ Re-eval POC expires Desiree Baca #/ Referral # Total   Visits  Start date  Expiration date Extension  Visit limitation? PT only or  PT+OT? Co-Insurance   Medicare: CMS 22  Not required NA NA NA NA NA PT No    2/21/22 4/14/22             3/21/22 4/14/22                                               Today's date: 3/24/2022  Patient name: Tello Cheung  : 1940  MRN: [id number]  Referring provider: MARIPOSA Patterson  Dx:   Encounter Diagnosis     ICD-10-CM    1  Imbalance  R26 [id number]                   Subjective: Patient reports she has made significant progress compared to when she started and feels ready to d/c  Patient asked if she can return to PT if she feels she regresses  Objective: See treatment diary below    NMR:   - Chin tucks, 20x, 3 second isometric hold  - Side stepping on foam w/ intermittent hurdles  - Tandem on foam w/ intermittent hurdles   - Side stepping on foam  - Fwd/back with posterior resistance (green)      TA: Updated HEP 3/24:   Access Code: [email or web address]  URL: [email or web address]  Date: 2022  Prepared by: Mandeep Paulson    Exercises  · Standing March with Counter Support - 1 x daily - 7 x weekly - 3 sets - 10 reps  · Standing Hip Abduction with Counter Support - 1 x daily - 7 x weekly - 3 sets - 10 reps  · Standing Hip Extension with Counter Support - 1 x daily - 7 x weekly - 3 sets - 10 reps  · Side Stepping with Counter Support - 1 x daily - 7 x weekly - 10 reps  · Seated Cervical Retraction - 1 x daily - 7 x weekly - 2 sets - 10 reps - 3 seconds hold  · Seated Upper Trapezius Stretch - 1 x daily - 7 x weekly - 3 reps - 30 seconds hold  · Sit to Stand - 1 x daily - 7 x weekly - 2 sets - 10 reps      Assessment: Patient tolerated session well today  Due to progress since initiating PT and symptom resolution, patient would like to d/c from PT at this time to HEP   She states she is going to become more active now that the weather is improving (walking, gardening, etc ) and feels ready to perform these activities without restriction  Patient has met all short term goals and 6/10 long term goals  Per all administered outcome measures, patient is not at high risk of falls  Administered comprehensive HEP today as outlined above; patient verbalized understanding  Patient with no further questions at this time  In light of objective and subjective progress since initial evaluation, patient may be discharged from skilled outpatient PT at this time           Goals  Short Term Goals (4 weeks):    - Patient will be independent in basic HEP 2-3 weeks - MET  - Patient will improve 5xSTS score by 2 3 seconds from initial score to promote improved LE functional strength needed for ADLs - MET  - Patient will complete FGA and DGI to assess dynamic balance - MET  - Patient will perform 6MWT to assess endurance - MET  - Patient will perform 10mWT to assess gait speed - MET  - Patient will report 50% reduction in symptoms - MET  - Patient will be able to tolerate VOR x 1 for 1 minute to suggest improved function of vestibular system - MET    Long Term Goals (12 weeks):  - Patient will be independent in a comprehensive home exercise program - MET, given today  - Patient will improve scoring on DGI by 2 6 points from initial score to progress safety - MET  - Patient will improve gait speed by 0 18 m/s from initial score to improve safety with community ambulation - MET  - Patient will improve scoring on FGA by 4 points from initial score  to progress safety with dynamic tasks - MET  - Patient will be able to demonstrate HT in gait without veering - MET  - Patient will improve 6 Minute Walk Test score by 190 feet from initial score to promote improved cardiovascular endurance - NOT MET, progressing  - Patient will report 50% reduction in near falls in order to improve safety with functional tasks and reduce his risk for falls - MET  - Patient will report going on walks at least 3 days per week to promote independence and improved cardiovascular endurance - ongoing   - Patient will be able to ascend/descend stairs reciprocally with 1 UE assist to promote independence and safety with ADLs - progressing  - Patient will report 50% reduction in near falls when ambulating on uneven terrain - progressing, avoids uneven terrain     Plan: Discharge from skillled PT              Outcome Measures Initial Eval  1/20/22 + 1/24/22 PN  2/21/22 PN  3/21/22      5xSTS 21 43 sec, 0UE 10 93 sec, 0UE 10 73 sec, 0UE      TUG NP sec        10 meter 1 13 m/s Self-selected: 1 03 m/s    Top speed: 1 34 m/s Self-selected: 1 25 m/s      VISHNU NP/56        FGA 13/30 19/30 24/30      DGI 14/24 18/24 22/24      mCTSIB  - FTEO (firm)  - FTEC (firm)  - FTEO (foam)  - FTEC (foam)   30 sec  28 sec  12 sec  10 sec   30 sec  30 sec (+)  30 sec  30 sec (+)   30 sec  30 sec   30 sec  30 sec       6MWT 975 ft 1050 ft  1110 ft                                  Precautions: Balance  Past Medical History:   Diagnosis Date    Anxiety     Bleeding from varicose vein     Environmental allergies     Fracture of radius     Obesity     Scoliosis     mild     Vaginitis     Vulvovaginitis

## 2022-03-28 ENCOUNTER — APPOINTMENT (OUTPATIENT)
Dept: PHYSICAL THERAPY | Facility: CLINIC | Age: 82
End: 2022-03-28
Payer: MEDICARE

## 2022-03-31 ENCOUNTER — APPOINTMENT (OUTPATIENT)
Dept: PHYSICAL THERAPY | Facility: CLINIC | Age: 82
End: 2022-03-31
Payer: MEDICARE

## 2022-04-16 ENCOUNTER — IMMUNIZATIONS (OUTPATIENT)
Dept: FAMILY MEDICINE CLINIC | Facility: HOSPITAL | Age: 82
End: 2022-04-16

## 2022-04-16 PROCEDURE — 91305 COVID-19 PFIZER VACC TRIS-SUCROSE GRAY CAP 0.3 ML: CPT

## 2022-04-16 PROCEDURE — 0054A COVID-19 PFIZER VACC TRIS-SUCROSE GRAY CAP 0.3 ML: CPT

## 2022-05-09 ENCOUNTER — OFFICE VISIT (OUTPATIENT)
Dept: OTOLARYNGOLOGY | Facility: CLINIC | Age: 82
End: 2022-05-09
Payer: MEDICARE

## 2022-05-09 VITALS — HEIGHT: 61 IN | BODY MASS INDEX: 37 KG/M2 | WEIGHT: 196 LBS | TEMPERATURE: 97.5 F

## 2022-05-09 DIAGNOSIS — R26.89 IMBALANCE: Primary | ICD-10-CM

## 2022-05-09 DIAGNOSIS — H69.83 ETD (EUSTACHIAN TUBE DYSFUNCTION), BILATERAL: ICD-10-CM

## 2022-05-09 DIAGNOSIS — E55.9 VITAMIN D DEFICIENCY: ICD-10-CM

## 2022-05-09 DIAGNOSIS — H90.3 SENSORINEURAL HEARING LOSS (SNHL), BILATERAL: ICD-10-CM

## 2022-05-09 DIAGNOSIS — G89.29 CHRONIC NECK PAIN: ICD-10-CM

## 2022-05-09 DIAGNOSIS — M54.2 CHRONIC NECK PAIN: ICD-10-CM

## 2022-05-09 DIAGNOSIS — R49.0 HOARSENESS OF VOICE: ICD-10-CM

## 2022-05-09 PROCEDURE — 99214 OFFICE O/P EST MOD 30 MIN: CPT | Performed by: NURSE PRACTITIONER

## 2022-05-09 RX ORDER — FAMOTIDINE 40 MG/1
40 TABLET, FILM COATED ORAL 2 TIMES DAILY
Qty: 60 TABLET | Refills: 4 | Status: SHIPPED | OUTPATIENT
Start: 2022-05-09

## 2022-05-09 NOTE — PROGRESS NOTES
Assessment/Plan:    Imbalance  On exam, bilateral eac clear, no serous fluid, no cerumen impaction      Symptoms include left aural fullness, post nasal drip, dry tickling cough, posterior headaches, history sleep apnea, hoarseness, imbalance, headaches   Improving since last visit with PT interventions  Audiogram last visit indicating bilateral mild sloping to moderate SNHL, type A tymps with good word discrimination   May consider hearing amplification      Discussed dryness of ears and may use Decrease q-tip use - may use hydrocortisone cream to ears as needed for itching    Reviewed recent lab studies indicating TSH level normal, calcium level low 8 1, and vitamin D level low 17  Encouraged repeat vitamin D level in near future to determine further supplementation  Discussed possible causes of vertigo/imbalance including neurology, cardiac, autoimmune, Otitis media, sinusitis, ETD, and inner ear concerns  Discussed impact of vitamin D deficiency and hypocalcemia on symptoms        Treatment options include at home epley's, repeat lab studies, continue vestibular therapy, VNG testing, MRI brain with IAC, nasal steroids, saline rinses       After discussion agreed to continue Fluticasone nasal spray 2 sprays daily, Saline rinses daily for nasal symptoms   Given scripts for repeat labs for vit D, B12, and calcium  She is considering MRI brain with IAC due to residual imbalance symptoms  Follow up in 6 to 8 weeks with Dr Cy Briones for laryngoscopy due to throat related symptoms  Hoarseness of voice  Discussed nature of chronic throat symptoms and may be multi-factorial   Discussed post nasal drip, reflux, vs pulmonary processes that may impact cough  Reviewed options including voice rest, reflux medication therapy  Reviewed side effects and action of reflux therapy medications  After discussion agreed to H2 blockers  Follow up in 6 to 8 weeks to monitor, laryngoscopy next visit  Diagnoses and all orders for this visit:    Imbalance    Sensorineural hearing loss (SNHL), bilateral    Vitamin D deficiency    Hoarseness of voice  -     famotidine (PEPCID) 40 MG tablet; Take 1 tablet (40 mg total) by mouth 2 (two) times a day    Chronic neck pain    ETD (Eustachian tube dysfunction), bilateral          Subjective:      Patient ID: Delisa Vick is a 81 y o  female  Presents today as a follow up due to multiple ENT concerns  Since last visit improved dizziness and imbalance  Stopped PT as balance improved  Finds that if moves quickly has sensation will fall  No falls  Headaches less when gets dizzy episode  Concerned about sinuses causing dizziness  Diagnosed with sleep apnea, prior care with pulmonary  Sleeps sitting up due to snoring  Voice horse at times  Feels she Mumbles at times  Dry tickling cough often  Sinus pressure  Sensation along left ear persists  Occasionally when gets up in morning tongue feels stuck to roof mouth  Congestion improved with use of Flonase daily, greater than 2 months use  The following portions of the patient's history were reviewed and updated as appropriate: allergies, current medications, past family history, past medical history, past social history, past surgical history and problem list     Review of Systems   Constitutional: Negative  HENT: Positive for congestion  Negative for ear discharge, ear pain, hearing loss, nosebleeds, postnasal drip, rhinorrhea, sinus pressure, sinus pain, sore throat, tinnitus and voice change  Eyes: Negative  Respiratory: Positive for cough  Negative for chest tightness and shortness of breath  Cardiovascular: Negative  Gastrointestinal: Negative  Endocrine: Negative  Musculoskeletal: Negative  Skin: Negative for color change  Neurological: Positive for dizziness  Negative for numbness and headaches  Psychiatric/Behavioral: Negative            Objective:      Temp 97 5 °F (36 4 °C) (Temporal)   Ht 5' 1" (1 549 m)   Wt 88 9 kg (196 lb)   BMI 37 03 kg/m²          Physical Exam  Constitutional:       Appearance: She is well-developed  HENT:      Head: Normocephalic  Right Ear: Hearing, tympanic membrane, ear canal and external ear normal  No decreased hearing noted  No drainage or tenderness  Tympanic membrane is not perforated or erythematous  Left Ear: Hearing, tympanic membrane, ear canal and external ear normal  No decreased hearing noted  No drainage or tenderness  Tympanic membrane is not perforated or erythematous  Nose: Nose normal  No nasal deformity or septal deviation  Mouth/Throat:      Mouth: Mucous membranes are not pale and not dry  No oral lesions  Dentition: Normal dentition  Pharynx: Uvula midline  No oropharyngeal exudate  Neck:      Trachea: No tracheal deviation  Cardiovascular:      Rate and Rhythm: Normal rate  Pulmonary:      Effort: Pulmonary effort is normal  No accessory muscle usage or respiratory distress  Musculoskeletal:      Right shoulder: Normal range of motion  Cervical back: Full passive range of motion without pain, normal range of motion and neck supple  Lymphadenopathy:      Cervical: No cervical adenopathy  Skin:     General: Skin is warm and dry  Neurological:      Mental Status: She is alert and oriented to person, place, and time  Cranial Nerves: No cranial nerve deficit  Sensory: No sensory deficit  Psychiatric:         Behavior: Behavior is cooperative

## 2022-05-09 NOTE — ASSESSMENT & PLAN NOTE
On exam, bilateral eac clear, no serous fluid, no cerumen impaction      Symptoms include left aural fullness, post nasal drip, dry tickling cough, posterior headaches, history sleep apnea, hoarseness, imbalance, headaches   Improving since last visit with PT interventions  Audiogram last visit indicating bilateral mild sloping to moderate SNHL, type A tymps with good word discrimination   May consider hearing amplification      Discussed dryness of ears and may use Decrease q-tip use - may use hydrocortisone cream to ears as needed for itching    Reviewed recent lab studies indicating TSH level normal, calcium level low 8 1, and vitamin D level low 17  Encouraged repeat vitamin D level in near future to determine further supplementation  Discussed possible causes of vertigo/imbalance including neurology, cardiac, autoimmune, Otitis media, sinusitis, ETD, and inner ear concerns  Discussed impact of vitamin D deficiency and hypocalcemia on symptoms        Treatment options include at home epley's, repeat lab studies, continue vestibular therapy, VNG testing, MRI brain with IAC, nasal steroids, saline rinses       After discussion agreed to continue Fluticasone nasal spray 2 sprays daily, Saline rinses daily for nasal symptoms   Given scripts for repeat labs for vit D, B12, and calcium  She is considering MRI brain with IAC due to residual imbalance symptoms  Follow up in 6 to 8 weeks with Dr Radha Balderas for laryngoscopy due to throat related symptoms

## 2022-05-09 NOTE — PATIENT INSTRUCTIONS
Nasal dryness - antibiotic ointment to both nostrils 3 times per day  Saline sprays prior to ointment    Prudence Maha after ointment and saline sprays    Consider MRI brain and cervical spine    Reflux medications    Decrease q-tip use - may use hydrocortisone cream to ears as needed for itching    Lab work

## 2022-05-10 NOTE — ASSESSMENT & PLAN NOTE
Discussed nature of chronic throat symptoms and may be multi-factorial   Discussed post nasal drip, reflux, vs pulmonary processes that may impact cough  Reviewed options including voice rest, reflux medication therapy  Reviewed side effects and action of reflux therapy medications  After discussion agreed to H2 blockers  Follow up in 6 to 8 weeks to monitor, laryngoscopy next visit

## 2022-05-13 ENCOUNTER — APPOINTMENT (OUTPATIENT)
Dept: LAB | Facility: HOSPITAL | Age: 82
End: 2022-05-13
Payer: MEDICARE

## 2022-05-13 DIAGNOSIS — K22.70 BARRETT'S ESOPHAGUS WITHOUT DYSPLASIA: ICD-10-CM

## 2022-05-13 DIAGNOSIS — R26.89 IMBALANCE: ICD-10-CM

## 2022-05-13 DIAGNOSIS — R49.0 HOARSENESS OF VOICE: ICD-10-CM

## 2022-05-13 DIAGNOSIS — E55.9 VITAMIN D DEFICIENCY: ICD-10-CM

## 2022-05-13 DIAGNOSIS — H69.83 ETD (EUSTACHIAN TUBE DYSFUNCTION), BILATERAL: ICD-10-CM

## 2022-05-13 DIAGNOSIS — H90.3 SENSORINEURAL HEARING LOSS (SNHL), BILATERAL: ICD-10-CM

## 2022-05-13 LAB
FOLATE SERPL-MCNC: 13.8 NG/ML (ref 3.1–17.5)
VIT B12 SERPL-MCNC: 1186 PG/ML (ref 100–900)

## 2022-05-13 PROCEDURE — 82607 VITAMIN B-12: CPT

## 2022-05-13 PROCEDURE — 82746 ASSAY OF FOLIC ACID SERUM: CPT

## 2022-05-13 PROCEDURE — 36415 COLL VENOUS BLD VENIPUNCTURE: CPT

## 2022-05-25 ENCOUNTER — APPOINTMENT (OUTPATIENT)
Dept: LAB | Facility: HOSPITAL | Age: 82
End: 2022-05-25
Payer: MEDICARE

## 2022-05-25 ENCOUNTER — TELEPHONE (OUTPATIENT)
Dept: OTOLARYNGOLOGY | Facility: CLINIC | Age: 82
End: 2022-05-25

## 2022-05-25 DIAGNOSIS — E55.9 VITAMIN D DEFICIENCY: Primary | ICD-10-CM

## 2022-05-25 DIAGNOSIS — E55.9 VITAMIN D DEFICIENCY: ICD-10-CM

## 2022-05-25 DIAGNOSIS — E83.51 HYPOCALCEMIA: ICD-10-CM

## 2022-05-25 LAB
25(OH)D3 SERPL-MCNC: 69.8 NG/ML (ref 30–100)
CALCIUM SERPL-MCNC: 8.8 MG/DL (ref 8.3–10.1)

## 2022-05-25 PROCEDURE — 36415 COLL VENOUS BLD VENIPUNCTURE: CPT

## 2022-05-25 PROCEDURE — 82310 ASSAY OF CALCIUM: CPT

## 2022-05-25 PROCEDURE — 82306 VITAMIN D 25 HYDROXY: CPT

## 2022-05-25 NOTE — TELEPHONE ENCOUNTER
Contacted pt regarding labs:    Vitamin D level now 69, Calcium 8 8    Stop the vitamin prescription supplement      Vitamin D 3 - 1000 to 2000 per day    Repeat level in 6 months to one year    Pt agreed

## 2022-07-09 ENCOUNTER — TELEPHONE (OUTPATIENT)
Dept: OTHER | Facility: OTHER | Age: 82
End: 2022-07-09

## 2022-07-11 ENCOUNTER — OFFICE VISIT (OUTPATIENT)
Dept: OTOLARYNGOLOGY | Facility: CLINIC | Age: 82
End: 2022-07-11
Payer: MEDICARE

## 2022-07-11 VITALS — HEIGHT: 61 IN | BODY MASS INDEX: 37.76 KG/M2 | TEMPERATURE: 97.3 F | WEIGHT: 200 LBS

## 2022-07-11 DIAGNOSIS — R13.10 DYSPHAGIA, UNSPECIFIED TYPE: ICD-10-CM

## 2022-07-11 DIAGNOSIS — H69.83 ETD (EUSTACHIAN TUBE DYSFUNCTION), BILATERAL: Primary | ICD-10-CM

## 2022-07-11 DIAGNOSIS — K21.00 GASTROESOPHAGEAL REFLUX DISEASE WITH ESOPHAGITIS, UNSPECIFIED WHETHER HEMORRHAGE: ICD-10-CM

## 2022-07-11 DIAGNOSIS — E66.01 OBESITY, MORBID (HCC): ICD-10-CM

## 2022-07-11 PROCEDURE — 99214 OFFICE O/P EST MOD 30 MIN: CPT | Performed by: STUDENT IN AN ORGANIZED HEALTH CARE EDUCATION/TRAINING PROGRAM

## 2022-07-11 PROCEDURE — 31575 DIAGNOSTIC LARYNGOSCOPY: CPT | Performed by: STUDENT IN AN ORGANIZED HEALTH CARE EDUCATION/TRAINING PROGRAM

## 2022-07-11 RX ORDER — PANTOPRAZOLE SODIUM 40 MG/1
40 TABLET, DELAYED RELEASE ORAL DAILY
Qty: 30 TABLET | Refills: 3 | Status: SHIPPED | OUTPATIENT
Start: 2022-07-11 | End: 2022-09-12

## 2022-07-11 RX ORDER — FAMOTIDINE 40 MG/1
40 TABLET, FILM COATED ORAL DAILY
Qty: 30 TABLET | Refills: 3 | Status: SHIPPED | OUTPATIENT
Start: 2022-07-11 | End: 2022-09-12

## 2022-07-11 NOTE — PROGRESS NOTES
Otolaryngology-- Head and Neck Surgery Follow up visit      Follow up:      5/9/22:  Lisandra Heck is a 81 y o  female  Presents today as a follow up due to multiple ENT concerns  Since last visit improved dizziness and imbalance  Stopped PT as balance improved  Finds that if moves quickly has sensation will fall  No falls  Headaches less when gets dizzy episode  Concerned about sinuses causing dizziness  Diagnosed with sleep apnea, prior care with pulmonary  Sleeps sitting up due to snoring  Voice horse at times  Feels she Mumbles at times  Dry tickling cough often  Sinus pressure  Sensation along left ear persists  Occasionally when gets up in morning tongue feels stuck to roof mouth  Congestion improved with use of Flonase daily, greater than 2 months use          7/11/22:  Referred by Manolo Doyle for flexible laryngoscopy  Dysphagia:  Onset and duration of dysphagia:few months  gradually progressive difficulty if swallowing sold food :  No difficulty initiating the swallowing  coughing or chocking while swallowing  Food does stick in the back of the throat vs chest  Other associated symptoms of dysphagia  No history of food regurgitation   change on voice/wet voice  No history of aspiration or aspiration pneumonia   globus sensation  No history of odynophagia   GERD  history of heart burn  history of burping/belching  history of water brash/bad taste in the mouth    Relevant PMH for dysphagia  No history of CVS accidents  No history of neurological disorders  B symptoms  The patient denied any history of night sweats, fever/chills, anorexia, weight loss  Also denied any history of otalgia, hemoptysis, neck or other body masses    Relevant GI work up  history of upper GI endoscopy, 10 years ago  No history of swallowing studies   Medications associated with dysphagia  Not on Antibiotics like Doxycycline, Bactrim or Clindamycin  Not on medications like NSAID, Potasium chloride tab  Not on medications like Antihistamine  Not on ACE inhibitors   Not on iron tablets  Relevant social history of dysphagia  Non smoker  Not alcohol consumer            Review of any relevant imaging:      Interval Review of systems: Pertinent review of systems documented in the HPI  Interval Social History:  Social History     Socioeconomic History    Marital status: /Civil Union     Spouse name: Not on file    Number of children: Not on file    Years of education: Not on file    Highest education level: Not on file   Occupational History    Not on file   Tobacco Use    Smoking status: Never Smoker    Smokeless tobacco: Never Used   Vaping Use    Vaping Use: Never used   Substance and Sexual Activity    Alcohol use: Yes     Comment: social     Drug use: No    Sexual activity: Not on file   Other Topics Concern    Not on file   Social History Narrative    Not on file     Social Determinants of Health     Financial Resource Strain: Not on file   Food Insecurity: Not on file   Transportation Needs: Not on file   Physical Activity: Not on file   Stress: Not on file   Social Connections: Not on file   Intimate Partner Violence: Not on file   Housing Stability: Not on file       Interval Physical Examination:  Temp (!) 97 3 °F (36 3 °C) (Temporal)   Ht 5' 1" (1 549 m)   Wt 90 7 kg (200 lb)   BMI 37 79 kg/m²   Head: Atraumatic, no visible scalp lesions, parotid and submandibular salivary glands non-tender to palpation and without masses bilaterally  Neck:  No visible or palpable cervical lesions or lymphadenopathy, thyroid gland is normal in size and symmetry and without masses, normal laryngeal elevation with swallowing  Ears:    Right ear :  Auricles normal in appearance, mastoid prominence non-tender, external auditory canal clear  Tympanic membrane intact  Left ear :  Auricles normal in appearance, mastoid prominence non-tender, external auditory canal clear  Tympanic membrane intact     Nose/Sinuses: External appearance unremarkable, no maxillary or frontal sinus tenderness to palpation bilaterally  Anterior rhinoscopy reveals: thick mucus  Oral Cavity:  Moist mucus membranes, gums and dentition unremarkable, no oral mucosal masses or lesions, floor of mouth soft, tongue mobile without masses or lesions  Oropharynx:  Base of tongue soft and without masses, tonsils bilaterally unremarkable, soft palate mucosa unremarkable  Flexible laryngoscopy performed:  Fiberoptic scope advanced through left nare, findings:  Nasal cavity: Septum deviated to the left, no polyps or mucopus  Nasopharynx: unremarkable, no masses or lesions, eustachian tube orifi and Fossae of Rosenmuller unremakable  Oropharynx: mucosa moist, no masses or lesions, tongue base, lateral and posterior walls normal  Larynx: True vocal folds mobile, no masses or lesions, widely patent glottic chink, arytenoids erythema and evidence of PND  Hypopharynx: Pyriform sinuses clear without masses or pooling  Post-cricoid area unremarkable  Abdomen: Soft and lax  Extremities: No bruises   Eyes:  Extra-ocular movements intact, pupils equally round and reactive to light and accommodation, the lids and conjunctivae are normal in appearance  Constitutional:  Well developed, well nourished and groomed, in no acute distress  Cardiovascular:  Normal rate and rhythm, no palpable thrills, no jugulovenous distension observed  Respiratory:  Normal respiratory effort without evidence of retractions or use of accessory muscles  Neurologic:  Cranial nerves II-XII intact bilaterally  Psychiatric:  Alert and oriented to time, place and person  Procedure:  Flexible laryngoscopy performed: The nasal cavities were decongested with lidocaine and oxymetazoline spray  Endoscopy type: flexible  Results: look to the examination  The patient tolerated the procedure well  Assessment:  1  ETD (Eustachian tube dysfunction), bilateral     2   Gastroesophageal reflux disease with esophagitis, unspecified whether hemorrhage  pantoprazole (PROTONIX) 40 mg tablet    famotidine (PEPCID) 40 MG tablet   3  Dysphagia, unspecified type  FL barium swallow video w speech   4  Obesity, morbid ([facility] [address] )         Plan:    Dysphagia  MBS study ordered  GERD and Reflux laryngitis   Treatment strategies discussed included decreasing caffeine intake, discontinuing late night eating, sleeping with the head of the bed elevated  Pantoprazole and Famotidine prescribed  We will consider more aggressive acid suppression treatment or 24 hours PH monitoring if no improvement after medication use for more than 4 weeks  Rhinitis and Post nasal drip   Discussed treatment options including use of saline rinses, nasal steroids, allergy medications, allergy testing, imaging, and further surgical interventions  Given instructions on use of nasal steroids, saline rinses and allergy medications

## 2022-07-20 ENCOUNTER — HOSPITAL ENCOUNTER (EMERGENCY)
Facility: HOSPITAL | Age: 82
Discharge: HOME | End: 2022-07-20
Attending: EMERGENCY MEDICINE | Admitting: EMERGENCY MEDICINE
Payer: MEDICARE

## 2022-07-20 VITALS
TEMPERATURE: 99 F | HEART RATE: 83 BPM | SYSTOLIC BLOOD PRESSURE: 145 MMHG | RESPIRATION RATE: 18 BRPM | OXYGEN SATURATION: 95 % | DIASTOLIC BLOOD PRESSURE: 100 MMHG | WEIGHT: 199 LBS | BODY MASS INDEX: 37.6 KG/M2

## 2022-07-20 DIAGNOSIS — L03.90 CELLULITIS: Primary | ICD-10-CM

## 2022-07-20 PROCEDURE — 99284 EMERGENCY DEPT VISIT MOD MDM: CPT | Performed by: PHYSICIAN ASSISTANT

## 2022-07-20 PROCEDURE — 99281 EMR DPT VST MAYX REQ PHY/QHP: CPT

## 2022-07-20 RX ORDER — CEPHALEXIN 500 MG/1
500 CAPSULE ORAL 2 TIMES DAILY
Qty: 20 CAPSULE | Refills: 0 | Status: SHIPPED | OUTPATIENT
Start: 2022-07-20 | End: 2022-07-30

## 2022-07-20 RX ORDER — CEPHALEXIN 500 MG/1
500 CAPSULE ORAL ONCE
Status: COMPLETED | OUTPATIENT
Start: 2022-07-20 | End: 2022-07-20

## 2022-07-20 RX ADMIN — CEPHALEXIN 500 MG: 500 CAPSULE ORAL at 18:02

## 2022-07-20 NOTE — ED PROVIDER NOTES
History  Chief Complaint   Patient presents with    Insect Bite     Redness and swelling to the right forehead  " Very painful"     82 y/o female presenting with redness and warmth and pain to the right forehead  Unsure if she was bitten by an insect, however, had no itching, no drainage  Had some discomfort in the area before the redness  Otherwise feeling well without complaints  Denies fevers, N//V, eye pain, pain with eye movement  Prior to Admission Medications   Prescriptions Last Dose Informant Patient Reported? Taking?    B COMPLEX VITAMINS PO  Self Yes No   Sig: Take by mouth   Naproxen Sodium 220 MG CAPS  Self Yes No   Sig: Take 1 capsule by mouth daily     albuterol (Ventolin HFA) 90 mcg/act inhaler  Self No No   Sig: Inhale 2 puffs every 6 (six) hours as needed for wheezing   aspirin 81 mg chewable tablet  Self Yes No   Sig: Chew 1 tablet daily     ergocalciferol (VITAMIN D2) 50,000 units  Self No No   Sig: Take 1 capsule (50,000 Units total) by mouth once a week   famotidine (PEPCID) 40 MG tablet   No No   Sig: Take 1 tablet (40 mg total) by mouth 2 (two) times a day   Patient not taking: Reported on 2022   famotidine (PEPCID) 40 MG tablet   No No   Sig: Take 1 tablet (40 mg total) by mouth daily   fluticasone (FLONASE) 50 mcg/act nasal spray  Self No No   Si sprays into each nostril daily   nystatin-triamcinolone (MYCOLOG-II) cream  Self Yes No   Sig: Apply topically as needed     pantoprazole (PROTONIX) 40 mg tablet   No No   Sig: Take 1 tablet (40 mg total) by mouth daily   zinc gluconate 50 mg tablet  Self Yes No   Sig: Take 50 mg by mouth daily      Facility-Administered Medications: None       Past Medical History:   Diagnosis Date    Anxiety     Bleeding from varicose vein     Environmental allergies     Fracture of radius     Obesity     Scoliosis     mild     Vaginitis     Vulvovaginitis        Past Surgical History:   Procedure Laterality Date    EYE SURGERY cataract    TUBAL LIGATION         Family History   Problem Relation Age of Onset    Colon cancer Maternal Aunt     Stroke Mother      I have reviewed and agree with the history as documented  E-Cigarette/Vaping    E-Cigarette Use Never User      E-Cigarette/Vaping Substances    Nicotine No     THC No     CBD No     Flavoring No     Other No     Unknown No      Social History     Tobacco Use    Smoking status: Never Smoker    Smokeless tobacco: Never Used   Vaping Use    Vaping Use: Never used   Substance Use Topics    Alcohol use: Yes     Comment: social     Drug use: No       Review of Systems   Constitutional: Negative  HENT: Negative  Eyes: Negative  Respiratory: Negative  Cardiovascular: Negative  Gastrointestinal: Negative  Endocrine: Negative  Genitourinary: Negative  Musculoskeletal: Negative  Skin: Positive for color change  Negative for pallor, rash and wound  Allergic/Immunologic: Negative  Neurological: Negative  Hematological: Negative  Psychiatric/Behavioral: Negative  All other systems reviewed and are negative  Physical Exam  Physical Exam  Vitals and nursing note reviewed  Constitutional:       Appearance: Normal appearance  HENT:      Head: Normocephalic and atraumatic  Right Ear: External ear normal       Left Ear: External ear normal       Nose: Nose normal    Eyes:      Conjunctiva/sclera: Conjunctivae normal    Cardiovascular:      Rate and Rhythm: Normal rate  Pulses: Normal pulses  Pulmonary:      Effort: Pulmonary effort is normal       Comments: spo2 is 95% indicating adequate oxygenation   Abdominal:      General: There is no distension  Musculoskeletal:         General: No deformity  Normal range of motion  Cervical back: Normal range of motion  Skin:     General: Skin is warm and dry  Capillary Refill: Capillary refill takes less than 2 seconds  Findings: Erythema present  No rash  Neurological:      General: No focal deficit present  Mental Status: She is alert and oriented to person, place, and time  Mental status is at baseline  Deep Tendon Reflexes: Reflexes abnormal    Psychiatric:         Mood and Affect: Mood normal          Behavior: Behavior normal          Thought Content: Thought content normal          Judgment: Judgment normal          Vital Signs  ED Triage Vitals [07/20/22 1742]   Temperature Pulse Respirations Blood Pressure SpO2   99 °F (37 2 °C) 83 18 145/100 95 %      Temp src Heart Rate Source Patient Position - Orthostatic VS BP Location FiO2 (%)   -- -- -- -- --      Pain Score       8           Vitals:    07/20/22 1742   BP: 145/100   Pulse: 83         Visual Acuity      ED Medications  Medications   cephalexin (KEFLEX) capsule 500 mg (500 mg Oral Given 7/20/22 1802)       Diagnostic Studies  Results Reviewed     None                 No orders to display              Procedures  Procedures         ED Course                               SBIRT 20yo+    Flowsheet Row Most Recent Value   SBIRT (25 yo +)    In order to provide better care to our patients, we are screening all of our patients for alcohol and drug use  Would it be okay to ask you these screening questions? No Filed at: 07/20/2022 1753                    [facility]  Number of Diagnoses or Management Options  Diagnosis management comments: Appears to maybe have been an insect bite, will have patient perform warm compresses, there is a secondary localized cellulitic infection  Given thorough education regarding this diagnosis  Patient is informed to return to the emergency department for worsening of symptoms and was given proper education regarding their diagnosis and symptoms  Otherwise the patient is informed to follow up with their primary care doctor for re-evaluation  The patient verbalizes understanding and agrees with above assessment and plan  All questions were answered       Please Note: Fluency Direct voice recognition software may have been used in the creation of this document  Wrong words or sound a like substitutions may have occurred due to the inherent limitations of the voice software  Amount and/or Complexity of Data Reviewed  Review and summarize past medical records: yes  Independent visualization of images, tracings, or specimens: yes        Disposition  Final diagnoses:   Cellulitis     Time reflects when diagnosis was documented in both MDM as applicable and the Disposition within this note     Time User Action Codes Description Comment    7/20/2022  6:05 PM Jose 176, Elias 60 [G82 [id number]] Cellulitis       ED Disposition     ED Disposition   Discharge    Condition   Stable    Date/Time   Wed Jul 20, 2022  6:05 PM    Comment   Kath Mojgan discharge to home/self care  Follow-up Information     Follow up With Specialties Details Why Contact Info Additional Information    [facility] Emergency Department Emergency Medicine Go to  If symptoms worsen such as spreading redness, fevers, severe eye pain or pain with eye movement with increased redness, otherwise please follow up with your family doctor [facility] [id number] [address] [place] [address] Emergency Department, [address], [facility]          Patient's Medications   Discharge Prescriptions    CEPHALEXIN (KEFLEX) 500 MG CAPSULE    Take 1 capsule (500 mg total) by mouth 2 (two) times a day for 10 days       Start Date: 7/20/2022 End Date: 7/30/2022       Order Dose: 500 mg       Quantity: 20 capsule    Refills: 0       No discharge procedures on file      PDMP Review     None          ED Provider  Electronically Signed by           Ale Mccall PA-C  07/20/22 8092

## 2022-07-21 ENCOUNTER — OFFICE VISIT (OUTPATIENT)
Dept: FAMILY MEDICINE CLINIC | Facility: CLINIC | Age: 82
End: 2022-07-21
Payer: MEDICARE

## 2022-07-21 VITALS
DIASTOLIC BLOOD PRESSURE: 58 MMHG | TEMPERATURE: 97.7 F | HEIGHT: 61 IN | HEART RATE: 68 BPM | WEIGHT: 197 LBS | OXYGEN SATURATION: 97 % | BODY MASS INDEX: 37.19 KG/M2 | RESPIRATION RATE: 16 BRPM | SYSTOLIC BLOOD PRESSURE: 112 MMHG

## 2022-07-21 DIAGNOSIS — L08.9 SOFT TISSUE INFECTION: Primary | ICD-10-CM

## 2022-07-21 PROCEDURE — 99213 OFFICE O/P EST LOW 20 MIN: CPT | Performed by: NURSE PRACTITIONER

## 2022-07-21 RX ORDER — PREDNISONE 20 MG/1
20 TABLET ORAL DAILY
Qty: 5 TABLET | Refills: 0 | Status: SHIPPED | OUTPATIENT
Start: 2022-07-21 | End: 2022-07-26

## 2022-07-21 NOTE — PROGRESS NOTES
Assessment/Plan:    Appears more inflammatory vs infectious  She will continue with Keflex as prescribed  Advised on frequent cold compresses  Low dose Prednisone also prescribed for inflammation  Advised to call office tomorrow for any worsening swelling, new redness, warmth, or fever  Otherwise f/u if symptoms do not resolve once she completes medication    1  Soft tissue infection  -     predniSONE 20 mg tablet; Take 1 tablet (20 mg total) by mouth daily for 5 days          Patient Instructions   Monitor for any increased swelling or extending redness    Return if symptoms worsen or fail to improve  Subjective:      Patient ID: Ayaka Jacome is a 81 y o  female  Chief Complaint   Patient presents with    Painful Lump on Forehead     Went to ER last night  They mentioned shingles or cellulitis  Very painful  She feels as if it is spreading  Mz cma       Here today with complaints of a painful lump right forehead  Initially thought she had a boil, however has had increased pain and swelling extending to her eyebrow  She was concerned about infection and went to the ER, was started on antibiotics, has taken 2 doses thus far  No fevers, chills, body aches  The following portions of the patient's history were reviewed and updated as appropriate: allergies, current medications, past family history, past medical history, past social history, past surgical history and problem list     Review of Systems   Constitutional: Negative      Skin:        See HPI         Current Outpatient Medications   Medication Sig Dispense Refill    albuterol (Ventolin HFA) 90 mcg/act inhaler Inhale 2 puffs every 6 (six) hours as needed for wheezing 18 g 6    aspirin 81 mg chewable tablet Chew 1 tablet daily        B COMPLEX VITAMINS PO Take by mouth      cephalexin (KEFLEX) 500 mg capsule Take 1 capsule (500 mg total) by mouth 2 (two) times a day for 10 days 20 capsule 0    ergocalciferol (VITAMIN D2) 50,000 units Take 1 capsule (50,000 Units total) by mouth once a week 12 capsule 0    famotidine (PEPCID) 40 MG tablet Take 1 tablet (40 mg total) by mouth 2 (two) times a day 60 tablet 4    famotidine (PEPCID) 40 MG tablet Take 1 tablet (40 mg total) by mouth daily 30 tablet 3    fluticasone (FLONASE) 50 mcg/act nasal spray 2 sprays into each nostril daily 1 g 6    Naproxen Sodium 220 MG CAPS Take 1 capsule by mouth daily        nystatin-triamcinolone (MYCOLOG-II) cream Apply topically as needed        pantoprazole (PROTONIX) 40 mg tablet Take 1 tablet (40 mg total) by mouth daily 30 tablet 3    predniSONE 20 mg tablet Take 1 tablet (20 mg total) by mouth daily for 5 days 5 tablet 0    zinc gluconate 50 mg tablet Take 50 mg by mouth daily       No current facility-administered medications for this visit  Objective:    /58   Pulse 68   Temp 97 7 °F (36 5 °C)   Resp 16   Ht 5' 1" (1 549 m)   Wt 89 4 kg (197 lb)   SpO2 97%   BMI 37 22 kg/m²        Physical Exam  Vitals and nursing note reviewed  Constitutional:       Appearance: She is well-developed  Cardiovascular:      Rate and Rhythm: Normal rate and regular rhythm  Heart sounds: Normal heart sounds  No murmur heard  Pulmonary:      Effort: Pulmonary effort is normal       Breath sounds: Normal breath sounds  Skin:     General: Skin is warm and dry  Comments: See image below   Neurological:      Mental Status: She is alert     Psychiatric:         Mood and Affect: Mood normal          Behavior: Behavior normal                     MARIPOSA Peraza

## 2022-08-04 ENCOUNTER — OFFICE VISIT (OUTPATIENT)
Dept: FAMILY MEDICINE CLINIC | Facility: CLINIC | Age: 82
End: 2022-08-04
Payer: MEDICARE

## 2022-08-04 ENCOUNTER — APPOINTMENT (OUTPATIENT)
Dept: RADIOLOGY | Facility: CLINIC | Age: 82
End: 2022-08-04
Payer: MEDICARE

## 2022-08-04 VITALS
WEIGHT: 199 LBS | BODY MASS INDEX: 37.57 KG/M2 | SYSTOLIC BLOOD PRESSURE: 122 MMHG | OXYGEN SATURATION: 97 % | HEIGHT: 61 IN | HEART RATE: 74 BPM | TEMPERATURE: 97.7 F | RESPIRATION RATE: 16 BRPM | DIASTOLIC BLOOD PRESSURE: 64 MMHG

## 2022-08-04 DIAGNOSIS — M79.9: ICD-10-CM

## 2022-08-04 DIAGNOSIS — M79.9: Primary | ICD-10-CM

## 2022-08-04 PROCEDURE — 70250 X-RAY EXAM OF SKULL: CPT

## 2022-08-04 PROCEDURE — 99213 OFFICE O/P EST LOW 20 MIN: CPT | Performed by: NURSE PRACTITIONER

## 2022-08-04 NOTE — PROGRESS NOTES
Assessment/Plan:    Will start with XR d/t national shortage IV contrast    Consider additional imaging as appropriate  1  Disorder of soft tissue of head  -     XR skull < 4 vw; Future; Expected date: 08/04/2022          There are no Patient Instructions on file for this visit  Return if symptoms worsen or fail to improve  Subjective:      Patient ID: Kinga Rhodes is a 81 y o  female  Chief Complaint   Patient presents with    Follow-up     Lump on head / Still feels hard lump under skin  mz cma    Medication Problem     Flonase recall  Here today following up on swelling of her forehead  Reports this has gradually gone down with abx and Prednisone, however it has not completely resolved  There is a lump there, although no longer tender  No headaches, vision changes, or swollen lymph nodes  The following portions of the patient's history were reviewed and updated as appropriate: allergies, current medications, past family history, past medical history, past social history, past surgical history and problem list     Review of Systems   Constitutional: Negative  HENT:        See HPI   Respiratory: Negative  Cardiovascular: Negative  Neurological: Negative  Hematological: Negative for adenopathy           Current Outpatient Medications   Medication Sig Dispense Refill    albuterol (Ventolin HFA) 90 mcg/act inhaler Inhale 2 puffs every 6 (six) hours as needed for wheezing 18 g 6    aspirin 81 mg chewable tablet Chew 1 tablet daily        B COMPLEX VITAMINS PO Take by mouth      ergocalciferol (VITAMIN D2) 50,000 units Take 1 capsule (50,000 Units total) by mouth once a week 12 capsule 0    famotidine (PEPCID) 40 MG tablet Take 1 tablet (40 mg total) by mouth 2 (two) times a day 60 tablet 4    famotidine (PEPCID) 40 MG tablet Take 1 tablet (40 mg total) by mouth daily 30 tablet 3    Naproxen Sodium 220 MG CAPS Take 1 capsule by mouth daily        nystatin-triamcinolone (MYCOLOG-II) cream Apply topically as needed        pantoprazole (PROTONIX) 40 mg tablet Take 1 tablet (40 mg total) by mouth daily 30 tablet 3    zinc gluconate 50 mg tablet Take 50 mg by mouth daily      fluticasone (FLONASE) 50 mcg/act nasal spray 2 sprays into each nostril daily (Patient not taking: Reported on 8/4/2022) 1 g 6     No current facility-administered medications for this visit  Objective:    /64   Pulse 74   Temp 97 7 °F (36 5 °C)   Resp 16   Ht 5' 1" (1 549 m)   Wt 90 3 kg (199 lb)   SpO2 97%   BMI 37 60 kg/m²        Physical Exam  Vitals and nursing note reviewed  Constitutional:       Appearance: Normal appearance  She is well-developed  HENT:      Head:     Cardiovascular:      Rate and Rhythm: Normal rate and regular rhythm  Heart sounds: Normal heart sounds  No murmur heard  Pulmonary:      Effort: Pulmonary effort is normal       Breath sounds: Normal breath sounds  Lymphadenopathy:      Cervical: No cervical adenopathy  Skin:     General: Skin is warm and dry  Neurological:      Mental Status: She is alert     Psychiatric:         Mood and Affect: Mood normal          Behavior: Behavior normal                 MARIPOSA Kessler

## 2022-08-31 ENCOUNTER — RA CDI HCC (OUTPATIENT)
Dept: OTHER | Facility: HOSPITAL | Age: 82
End: 2022-08-31

## 2022-09-12 ENCOUNTER — OFFICE VISIT (OUTPATIENT)
Dept: FAMILY MEDICINE CLINIC | Facility: CLINIC | Age: 82
End: 2022-09-12
Payer: MEDICARE

## 2022-09-12 VITALS
OXYGEN SATURATION: 95 % | SYSTOLIC BLOOD PRESSURE: 128 MMHG | DIASTOLIC BLOOD PRESSURE: 70 MMHG | RESPIRATION RATE: 18 BRPM | TEMPERATURE: 96.1 F | HEIGHT: 61 IN | WEIGHT: 198.2 LBS | BODY MASS INDEX: 37.42 KG/M2 | HEART RATE: 75 BPM

## 2022-09-12 DIAGNOSIS — Z78.0 ASYMPTOMATIC MENOPAUSAL STATE: ICD-10-CM

## 2022-09-12 DIAGNOSIS — Z12.31 ENCOUNTER FOR SCREENING MAMMOGRAM FOR MALIGNANT NEOPLASM OF BREAST: ICD-10-CM

## 2022-09-12 DIAGNOSIS — Z13.6 SCREENING FOR CARDIOVASCULAR CONDITION: ICD-10-CM

## 2022-09-12 DIAGNOSIS — R22.9 SOFT TISSUE SWELLING: ICD-10-CM

## 2022-09-12 DIAGNOSIS — B37.9 CANDIDIASIS: ICD-10-CM

## 2022-09-12 DIAGNOSIS — Z00.00 ENCOUNTER FOR SUBSEQUENT ANNUAL WELLNESS VISIT (AWV) IN MEDICARE PATIENT: Primary | ICD-10-CM

## 2022-09-12 DIAGNOSIS — Z13.29 SCREENING FOR THYROID DISORDER: ICD-10-CM

## 2022-09-12 DIAGNOSIS — H02.401 PTOSIS OF RIGHT EYELID: ICD-10-CM

## 2022-09-12 DIAGNOSIS — H35.369: ICD-10-CM

## 2022-09-12 PROCEDURE — G0438 PPPS, INITIAL VISIT: HCPCS | Performed by: NURSE PRACTITIONER

## 2022-09-12 PROCEDURE — 99213 OFFICE O/P EST LOW 20 MIN: CPT | Performed by: NURSE PRACTITIONER

## 2022-09-12 NOTE — PROGRESS NOTES
Assessment and Plan:     Problem List Items Addressed This Visit    None     Visit Diagnoses     Encounter for subsequent annual wellness visit (AWV) in Medicare patient    -  Primary    Encounter for screening mammogram for malignant neoplasm of breast        Relevant Orders    Mammo screening bilateral w 3d & cad    [place] of eye        Relevant Orders    MRI brain w wo contrast    Ptosis of right eyelid        Relevant Orders    MRI brain w wo contrast    Soft tissue swelling        Relevant Orders    MRI brain w wo contrast    Candidiasis        Relevant Medications    nystatin-triamcinolone (MYCOLOG-II) cream    Screening for cardiovascular condition        Relevant Orders    Comprehensive metabolic panel    Lipid panel    Screening for thyroid disorder        Relevant Orders    TSH, 3rd generation with Free T4 reflex    Asymptomatic menopausal state        Relevant Orders    DXA bone density spine hip and pelvis        BMI Counseling: Body mass index is 37 45 kg/m²  The BMI is above normal  Nutrition recommendations include consuming healthier snacks  Exercise recommendations include moderate physical activity 150 minutes/week  Rationale for BMI follow-up plan is due to patient being overweight or obese  Depression Screening and Follow-up Plan: Patient was screened for depression during today's encounter  They screened negative with a PHQ-2 score of 0  Preventive health issues were discussed with patient, and age appropriate screening tests were ordered as noted in patient's After Visit Summary  Personalized health advice and appropriate referrals for health education or preventive services given if needed, as noted in patient's After Visit Summary  History of Present Illness:     Patient presents for a Medicare Wellness Visit    Here today for wellness visit  Reports persistent right forehead swelling/deformity    Initially thought this was a bug bite, was treated for cellulitis with abx and steroids, XR done/normal   Saw ophthalmology, thought there were findings associated with herpes zoster on eye exam      Patient Care Team:  Kassidy Hilton MD as PCP - General (Internal Medicine)  Brynn Larios MD     Review of Systems:     Review of Systems   Constitutional: Negative  Eyes:        See HPI   Respiratory: Negative  Cardiovascular: Negative  Gastrointestinal: Negative      Skin:        See HPI        Problem List:     Patient Active Problem List   Diagnosis    Abnormal stress ECG with treadmill    Moeller esophagus    Bilateral carotid artery stenosis    Colon, diverticulosis    Costochondritis    Generalized osteoarthritis of multiple sites    Obstructive sleep apnea    SOB (shortness of breath)    BMI 37 0-37 9, adult    Seasonal allergic rhinitis due to pollen    Nocturnal hypoxemia    Mild intermittent asthma without complication    Left-sided chest wall pain    Imbalance    ETD (Eustachian tube dysfunction), bilateral    Hoarseness of voice    Sensorineural hearing loss (SNHL), bilateral    Thyroid fullness    Vitamin D deficiency    Chronic neck pain    Obesity, morbid ([address] )      Past Medical and Surgical History:     Past Medical History:   Diagnosis Date    Anxiety     Benign essential hypertension 5/8/2014    Bleeding from varicose vein     Environmental allergies     Fracture of radius     Obesity     Scoliosis     mild     Vaginitis     Vulvovaginitis      Past Surgical History:   Procedure Laterality Date    EYE SURGERY      cataract    TUBAL LIGATION        Family History:     Family History   Problem Relation Age of Onset    Colon cancer Maternal Aunt     Stroke Mother       Social History:     Social History     Socioeconomic History    Marital status: /Civil Union     Spouse name: None    Number of children: None    Years of education: None    Highest education level: None   Occupational History    None   Tobacco Use    Smoking status: Never Smoker    Smokeless tobacco: Never Used   Vaping Use    Vaping Use: Never used   Substance and Sexual Activity    Alcohol use: Yes     Comment: social     Drug use: No    Sexual activity: None   Other Topics Concern    None   Social History Narrative    None     Social Determinants of Health     Financial Resource Strain: Low Risk     Difficulty of Paying Living Expenses: Not hard at all   Food Insecurity: Not on file   Transportation Needs: No Transportation Needs    Lack of Transportation (Medical): No    Lack of Transportation (Non-Medical): No   Physical Activity: Not on file   Stress: Not on file   Social Connections: Not on file   Intimate Partner Violence: Not on file   Housing Stability: Not on file      Medications and Allergies:     Current Outpatient Medications   Medication Sig Dispense Refill    albuterol (Ventolin HFA) 90 mcg/act inhaler Inhale 2 puffs every 6 (six) hours as needed for wheezing 18 g 6    aspirin 81 mg chewable tablet Chew 1 tablet      B COMPLEX VITAMINS PO Take by mouth      fluticasone (FLONASE) 50 mcg/act nasal spray 2 sprays into each nostril daily (Patient taking differently: 2 sprays into each nostril as needed) 1 g 6    nystatin-triamcinolone (MYCOLOG-II) cream Apply topically 2 (two) times a day 60 g 1    zinc gluconate 50 mg tablet Take 50 mg by mouth if needed       No current facility-administered medications for this visit       Allergies   Allergen Reactions    Penicillins     Sulfa Antibiotics       Immunizations:     Immunization History   Administered Date(s) Administered    COVID-19 PFIZER VACCINE 0 3 ML IM 01/18/2021, 02/06/2021, 10/09/2021    COVID-19 Pfizer vac (Chirag-sucrose, gray cap) 12 yr+ IM 04/16/2022    Pneumococcal Conjugate 13-Valent 02/25/2019    Pneumococcal Polysaccharide PPV23 10/01/2006      Health Maintenance:         Topic Date Due    Colorectal Cancer Screening  05/15/2024         Topic Date Due    Influenza Vaccine (1) 09/01/2022      Medicare Screening Tests and Risk Assessments:     Leena Marquis is here for her Subsequent Wellness visit  Last Medicare Wellness visit information reviewed, patient interviewed and updates made to the record today  Health Risk Assessment:   Patient rates overall health as fair  Patient feels that their physical health rating is slightly worse  Patient is very satisfied with their life  Eyesight was rated as same  Hearing was rated as same  Patient feels that their emotional and mental health rating is same  Patients states they are sometimes angry  Patient states they are often unusually tired/fatigued  Pain experienced in the last 7 days has been some  Patient's pain rating has been 3/10  Patient states that she has experienced no weight loss or gain in last 6 months  Depression Screening:   PHQ-2 Score: 0      Fall Risk Screening: In the past year, patient has experienced: no history of falling in past year      Urinary Incontinence Screening:   Patient has leaked urine accidently in the last six months  Home Safety:  Patient does not have trouble with stairs inside or outside of their home  Patient has working smoke alarms and has working carbon monoxide detector  Home safety hazards include: none  Nutrition:   Current diet is Regular  Medications:   Patient is currently taking over-the-counter supplements  OTC medications include: see medication list  Patient is able to manage medications  Activities of Daily Living (ADLs)/Instrumental Activities of Daily Living (IADLs):   Walk and transfer into and out of bed and chair?: Yes  Dress and groom yourself?: Yes    Bathe or shower yourself?: Yes    Feed yourself?  Yes  Do your laundry/housekeeping?: Yes  Manage your money, pay your bills and track your expenses?: Yes  Make your own meals?: Yes    Do your own shopping?: Yes    Previous Hospitalizations:   Any hospitalizations or ED visits within the last 12 months?: Yes How many hospitalizations have you had in the last year?: 1-2    Hospitalization Comments: Eye was swollen    Advance Care Planning:   Living will: Yes    Durable POA for healthcare: Yes    Advanced directive: Yes      Cognitive Screening:   Provider or family/friend/caregiver concerned regarding cognition?: No    PREVENTIVE SCREENINGS      Cardiovascular Screening:    General: Screening Current    Due for: Lipid Panel      Diabetes Screening:     General: Screening Current      Colorectal Cancer Screening:     General: Screening Current      Breast Cancer Screening:       Due for: Mammogram        Cervical Cancer Screening:    General: Screening Not Indicated      Osteoporosis Screening:      Due for: DXA Axial      Abdominal Aortic Aneurysm (AAA) Screening:        General: Screening Not Indicated      Lung Cancer Screening:     General: Screening Not Indicated      Hepatitis C Screening:    General: Screening Not Indicated    Screening, Brief Intervention, and Referral to Treatment (SBIRT)    Screening  Typical number of drinks in a day: 0  Typical number of drinks in a week: 0  Interpretation: Low risk drinking behavior  Single Item Drug Screening:  How often have you used an illegal drug (including marijuana) or a prescription medication for non-medical reasons in the past year? never    Single Item Drug Screen Score: 0  Interpretation: Negative screen for possible drug use disorder    Brief Intervention  Alcohol & drug use screenings were reviewed  No concerns regarding substance use disorder identified  Other Counseling Topics:   Calcium and vitamin D intake and regular weightbearing exercise  No exam data present     Physical Exam:     /70   Pulse 75   Temp (!) 96 1 °F (35 6 °C)   Resp 18   Ht 5' 1" (1 549 m)   Wt 89 9 kg (198 lb 3 2 oz)   SpO2 95%   BMI 37 45 kg/m²     Physical Exam  Vitals and nursing note reviewed  Constitutional:       General: She is not in acute distress  Appearance: Normal appearance  She is well-developed  HENT:      Head: Normocephalic and atraumatic  Right Ear: Tympanic membrane and external ear normal       Left Ear: Tympanic membrane and external ear normal       Nose: Nose normal       Mouth/Throat:      Pharynx: Oropharynx is clear  Eyes:      Extraocular Movements: Extraocular movements intact  Conjunctiva/sclera: Conjunctivae normal       Pupils: Pupils are equal, round, and reactive to light  Neck:      Thyroid: No thyromegaly  Cardiovascular:      Rate and Rhythm: Normal rate and regular rhythm  Pulses: Normal pulses  Heart sounds: Normal heart sounds  No murmur heard  Pulmonary:      Effort: Pulmonary effort is normal  No respiratory distress  Breath sounds: Normal breath sounds  Abdominal:      General: Bowel sounds are normal       Palpations: Abdomen is soft  Tenderness: There is no abdominal tenderness  Musculoskeletal:         General: Normal range of motion  Cervical back: Normal range of motion and neck supple  Skin:     General: Skin is warm and dry  Capillary Refill: Capillary refill takes less than 2 seconds  Comments: Subtle soft tissue swelling above right eyebrow/forehead   Neurological:      Mental Status: She is alert and oriented to person, place, and time  Sensory: No sensory deficit        Coordination: Coordination normal       Deep Tendon Reflexes: Reflexes normal    Psychiatric:         Mood and Affect: Mood normal          Behavior: Behavior normal           Neomia Guess, CRNP

## 2022-09-12 NOTE — PATIENT INSTRUCTIONS
Medicare Preventive Visit Patient Instructions  Thank you for completing your Welcome to Medicare Visit or Medicare Annual Wellness Visit today  Your next wellness visit will be due in one year (9/13/2023)  The screening/preventive services that you may require over the next 5-10 years are detailed below  Some tests may not apply to you based off risk factors and/or age  Screening tests ordered at today's visit but not completed yet may show as past due  Also, please note that scanned in results may not display below  Preventive Screenings:  Service Recommendations Previous Testing/Comments   Colorectal Cancer Screening  * Colonoscopy    * Fecal Occult Blood Test (FOBT)/Fecal Immunochemical Test (FIT)  * Fecal DNA/Cologuard Test  * Flexible Sigmoidoscopy Age: 39-70 years old   Colonoscopy: every 10 years (may be performed more frequently if at higher risk)  OR  FOBT/FIT: every 1 year  OR  Cologuard: every 3 years  OR  Sigmoidoscopy: every 5 years  Screening may be recommended earlier than age 39 if at higher risk for colorectal cancer  Also, an individualized decision between you and your healthcare provider will decide whether screening between the ages of 74-80 would be appropriate  Colonoscopy: 05/15/2019  FOBT/FIT: Not on file  Cologuard: Not on file  Sigmoidoscopy: Not on file    Screening Current     Breast Cancer Screening Age: 36 years old  Frequency: every 1-2 years  Not required if history of left and right mastectomy Mammogram: 11/22/2019        Cervical Cancer Screening Between the ages of 21-29, pap smear recommended once every 3 years  Between the ages of 33-67, can perform pap smear with HPV co-testing every 5 years     Recommendations may differ for women with a history of total hysterectomy, cervical cancer, or abnormal pap smears in past  Pap Smear: Not on file    Screening Not Indicated   Hepatitis C Screening Once for adults born between 1945 and 1965  More frequently in patients at high risk for Hepatitis C Hep C Antibody: Not on file        Diabetes Screening 1-2 times per year if you're at risk for diabetes or have pre-diabetes Fasting glucose: 93 mg/dL (2/16/2022)  A1C: No results in last 5 years (No results in last 5 years)  Screening Current   Cholesterol Screening Once every 5 years if you don't have a lipid disorder  May order more often based on risk factors  Lipid panel: 10/04/2021    Screening Current     Other Preventive Screenings Covered by Medicare:  1  Abdominal Aortic Aneurysm (AAA) Screening: covered once if your at risk  You're considered to be at risk if you have a family history of AAA  2  Lung Cancer Screening: covers low dose CT scan once per year if you meet all of the following conditions: (1) Age 50-69; (2) No signs or symptoms of lung cancer; (3) Current smoker or have quit smoking within the last 15 years; (4) You have a tobacco smoking history of at least 20 pack years (packs per day multiplied by number of years you smoked); (5) You get a written order from a healthcare provider  3  Glaucoma Screening: covered annually if you're considered high risk: (1) You have diabetes OR (2) Family history of glaucoma OR (3)  aged 48 and older OR (3)  American aged 72 and older  3  Osteoporosis Screening: covered every 2 years if you meet one of the following conditions: (1) You're estrogen deficient and at risk for osteoporosis based off medical history and other findings; (2) Have a vertebral abnormality; (3) On glucocorticoid therapy for more than 3 months; (4) Have primary hyperparathyroidism; (5) On osteoporosis medications and need to assess response to drug therapy  · Last bone density test (DXA Scan): 08/20/2020   5  HIV Screening: covered annually if you're between the age of 15-65  Also covered annually if you are younger than 13 and older than 72 with risk factors for HIV infection   For pregnant patients, it is covered up to 3 times per pregnancy  Immunizations:  Immunization Recommendations   Influenza Vaccine Annual influenza vaccination during flu season is recommended for all persons aged >= 6 months who do not have contraindications   Pneumococcal Vaccine   * Pneumococcal conjugate vaccine = PCV13 (Prevnar 13), PCV15 (Vaxneuvance), PCV20 (Prevnar 20)  * Pneumococcal polysaccharide vaccine = PPSV23 (Pneumovax) Adults 25-60 years old: 1-3 doses may be recommended based on certain risk factors  Adults 72 years old: 1-2 doses may be recommended based off what pneumonia vaccine you previously received   Hepatitis B Vaccine 3 dose series if at intermediate or high risk (ex: diabetes, end stage renal disease, liver disease)   Tetanus (Td) Vaccine - COST NOT COVERED BY MEDICARE PART B Following completion of primary series, a booster dose should be given every 10 years to maintain immunity against tetanus  Td may also be given as tetanus wound prophylaxis  Tdap Vaccine - COST NOT COVERED BY MEDICARE PART B Recommended at least once for all adults  For pregnant patients, recommended with each pregnancy  Shingles Vaccine (Shingrix) - COST NOT COVERED BY MEDICARE PART B  2 shot series recommended in those aged 48 and above     Health Maintenance Due:      Topic Date Due    Colorectal Cancer Screening  05/15/2024     Immunizations Due:      Topic Date Due    Influenza Vaccine (1) 09/01/2022     Advance Directives   What are advance directives? Advance directives are legal documents that state your wishes and plans for medical care  These plans are made ahead of time in case you lose your ability to make decisions for yourself  Advance directives can apply to any medical decision, such as the treatments you want, and if you want to donate organs  What are the types of advance directives? There are many types of advance directives, and each state has rules about how to use them   You may choose a combination of any of the following:  · Living will:  This is a written record of the treatment you want  You can also choose which treatments you do not want, which to limit, and which to stop at a certain time  This includes surgery, medicine, IV fluid, and tube feedings  · Durable power of  for healthcare [facility]): This is a written record that states who you want to make healthcare choices for you when you are unable to make them for yourself  This person, called a proxy, is usually a family member or a friend  You may choose more than 1 proxy  · Do not resuscitate (DNR) order:  A DNR order is used in case your heart stops beating or you stop breathing  It is a request not to have certain forms of treatment, such as CPR  A DNR order may be included in other types of advance directives  · Medical directive: This covers the care that you want if you are in a coma, near death, or unable to make decisions for yourself  You can list the treatments you want for each condition  Treatment may include pain medicine, surgery, blood transfusions, dialysis, IV or tube feedings, and a ventilator (breathing machine)  · Values history: This document has questions about your views, beliefs, and how you feel and think about life  This information can help others choose the care that you would choose  Why are advance directives important? An advance directive helps you control your care  Although spoken wishes may be used, it is better to have your wishes written down  Spoken wishes can be misunderstood, or not followed  Treatments may be given even if you do not want them  An advance directive may make it easier for your family to make difficult choices about your care  Urinary Incontinence   Urinary incontinence (UI)  is when you lose control of your bladder  UI develops because your bladder cannot store or empty urine properly  The 3 most common types of UI are stress incontinence, urge incontinence, or both    Medicines:   · May be given to help strengthen your bladder control  Report any side effects of medication to your healthcare provider  Do pelvic muscle exercises often:  Your pelvic muscles help you stop urinating  Squeeze these muscles tight for 5 seconds, then relax for 5 seconds  Gradually work up to squeezing for 10 seconds  Do 3 sets of 15 repetitions a day, or as directed  This will help strengthen your pelvic muscles and improve bladder control  Train your bladder:  Go to the bathroom at set times, such as every 2 hours, even if you do not feel the urge to go  You can also try to hold your urine when you feel the urge to go  For example, hold your urine for 5 minutes when you feel the urge to go  As that becomes easier, hold your urine for 10 minutes  Self-care:   · Keep a UI record  Write down how often you leak urine and how much you leak  Make a note of what you were doing when you leaked urine  · Drink liquids as directed  You may need to limit the amount of liquid you drink to help control your urine leakage  Do not drink any liquid right before you go to bed  Limit or do not have drinks that contain caffeine or alcohol  · Prevent constipation  Eat a variety of high-fiber foods  Good examples are high-fiber cereals, beans, vegetables, and whole-grain breads  Walking is the best way to trigger your intestines to have a bowel movement  · Exercise regularly and maintain a healthy weight  Weight loss and exercise will decrease pressure on your bladder and help you control your leakage  · Use a catheter as directed  to help empty your bladder  A catheter is a tiny, plastic tube that is put into your bladder to drain your urine  · Go to behavior therapy as directed  Behavior therapy may be used to help you learn to control your urge to urinate      Weight Management   Why it is important to manage your weight:  Being overweight increases your risk of health conditions such as heart disease, high blood pressure, type 2 diabetes, and certain types of cancer  It can also increase your risk for osteoarthritis, sleep apnea, and other respiratory problems  Aim for a slow, steady weight loss  Even a small amount of weight loss can lower your risk of health problems  How to lose weight safely:  A safe and healthy way to lose weight is to eat fewer calories and get regular exercise  You can lose up about 1 pound a week by decreasing the number of calories you eat by 500 calories each day  Healthy meal plan for weight management:  A healthy meal plan includes a variety of foods, contains fewer calories, and helps you stay healthy  A healthy meal plan includes the following:  · Eat whole-grain foods more often  A healthy meal plan should contain fiber  Fiber is the part of grains, fruits, and vegetables that is not broken down by your body  Whole-grain foods are healthy and provide extra fiber in your diet  Some examples of whole-grain foods are whole-wheat breads and pastas, oatmeal, brown rice, and bulgur  · Eat a variety of vegetables every day  Include dark, leafy greens such as spinach, kale, kofi greens, and mustard greens  Eat yellow and orange vegetables such as carrots, sweet potatoes, and winter squash  · Eat a variety of fruits every day  Choose fresh or canned fruit (canned in its own juice or light syrup) instead of juice  Fruit juice has very little or no fiber  · Eat low-fat dairy foods  Drink fat-free (skim) milk or 1% milk  Eat fat-free yogurt and low-fat cottage cheese  Try low-fat cheeses such as mozzarella and other reduced-fat cheeses  · Choose meat and other protein foods that are low in fat  Choose beans or other legumes such as split peas or lentils  Choose fish, skinless poultry (chicken or turkey), or lean cuts of red meat (beef or pork)  Before you cook meat or poultry, cut off any visible fat  · Use less fat and oil  Try baking foods instead of frying them   Add less fat, such as margarine, sour cream, regular salad dressing and mayonnaise to foods  Eat fewer high-fat foods  Some examples of high-fat foods include french fries, doughnuts, ice cream, and cakes  · Eat fewer sweets  Limit foods and drinks that are high in sugar  This includes candy, cookies, regular soda, and sweetened drinks  Exercise:  Exercise at least 30 minutes per day on most days of the week  Some examples of exercise include walking, biking, dancing, and swimming  You can also fit in more physical activity by taking the stairs instead of the elevator or parking farther away from stores  Ask your healthcare provider about the best exercise plan for you  © Copyright [employer] 2018 Information is for End User's use only and may not be sold, redistributed or otherwise used for commercial purposes   All illustrations and images included in CareNotes® are the copyrighted property of [employer]  or [address] [place] [address]

## 2022-09-26 ENCOUNTER — TELEPHONE (OUTPATIENT)
Dept: FAMILY MEDICINE CLINIC | Facility: CLINIC | Age: 82
End: 2022-09-26

## 2022-09-26 NOTE — TELEPHONE ENCOUNTER
Jolene Kawasaki was seen 9/12 and having an MRI on 10/13    She is having anesthesia  and wants to know if [facility] [facility] [address] [facility] will fill out forms or does she have to be seen? Form states to have clearance a month before procedure

## 2022-09-27 NOTE — TELEPHONE ENCOUNTER
Is she having anesthesia for the MRI? We didn't discuss this, nor did I order it to be done with anesthesia  Just making sure that is correct, I know MRIs with anesthesia typically schedule several months out and do not see anything in her chart regarding this

## 2022-10-01 ENCOUNTER — OFFICE VISIT (OUTPATIENT)
Dept: FAMILY MEDICINE CLINIC | Facility: CLINIC | Age: 82
End: 2022-10-01
Payer: MEDICARE

## 2022-10-01 VITALS
OXYGEN SATURATION: 96 % | DIASTOLIC BLOOD PRESSURE: 62 MMHG | HEART RATE: 64 BPM | WEIGHT: 194 LBS | RESPIRATION RATE: 16 BRPM | SYSTOLIC BLOOD PRESSURE: 122 MMHG | BODY MASS INDEX: 36.63 KG/M2 | HEIGHT: 61 IN | TEMPERATURE: 98 F

## 2022-10-01 DIAGNOSIS — E66.9 OBESITY (BMI 30-39.9): ICD-10-CM

## 2022-10-01 DIAGNOSIS — J45.20 MILD INTERMITTENT ASTHMA WITHOUT COMPLICATION: ICD-10-CM

## 2022-10-01 DIAGNOSIS — U07.1 COVID-19: Primary | ICD-10-CM

## 2022-10-01 PROCEDURE — 99214 OFFICE O/P EST MOD 30 MIN: CPT | Performed by: FAMILY MEDICINE

## 2022-10-01 RX ORDER — NIRMATRELVIR AND RITONAVIR 300-100 MG
3 KIT ORAL 2 TIMES DAILY
Qty: 30 TABLET | Refills: 0 | Status: SHIPPED | OUTPATIENT
Start: 2022-10-01 | End: 2022-10-06

## 2022-10-01 NOTE — PROGRESS NOTES
Assessment/Plan:    No problem-specific Assessment & Plan notes found for this encounter  covid new  Monitor resp status  ER if worse  Age risk  Hx of intermittent asthma    alf prn     Diagnoses and all orders for this visit:    COVID-19  -     nirmatrelvir & ritonavir (Paxlovid, 300/100,) tablet therapy pack; Take 3 tablets by mouth 2 (two) times a day for 5 days Take 2 nirmatrelvir tablets + 1 ritonavir tablet together per dose    Mild intermittent asthma without complication    Obesity (BMI 30-39  9)        We discussed the EUA use of Paxlovid, dosing, duration, timing, side effects, interactions, indications and Paxlovid rebound risk  Also discussed use of OTC options for symptomatic care and reasons for f/u or ER evaluation  Pt is agreeable to use  [facility] quarantine guidelines were discussed/advised  Return if symptoms worsen or fail to improve  Subjective:      Patient ID: Haydee Freedman is a 81 y o  female  Chief Complaint   Patient presents with    COVID-19     [place]agu[place] Anatoliy         HPI  Home test covid post last pm  Symptoms started yesterday   Runny nose  Aches  Head congestion and pressure  Breathing is ok  Feels off balance   also very recently positive    Had cov vax    The following portions of the patient's history were reviewed and updated as appropriate: allergies, current medications, past family history, past medical history, past social history, past surgical history and problem list     Review of Systems   Respiratory: Negative for wheezing  Neurological: Negative for syncope           Current Outpatient Medications   Medication Sig Dispense Refill    albuterol (Ventolin HFA) 90 mcg/act inhaler Inhale 2 puffs every 6 (six) hours as needed for wheezing 18 g 6    aspirin 81 mg chewable tablet Chew 1 tablet      B COMPLEX VITAMINS PO Take by mouth      fluticasone (FLONASE) 50 mcg/act nasal spray 2 sprays into each nostril daily (Patient taking differently: 2 sprays into each nostril as needed) 1 g 6    nirmatrelvir & ritonavir (Paxlovid, 300/100,) tablet therapy pack Take 3 tablets by mouth 2 (two) times a day for 5 days Take 2 nirmatrelvir tablets + 1 ritonavir tablet together per dose 30 tablet 0    nystatin-triamcinolone (MYCOLOG-II) cream Apply topically 2 (two) times a day 60 g 1    zinc gluconate 50 mg tablet Take 50 mg by mouth if needed       No current facility-administered medications for this visit  Objective:    /62   Pulse 64   Temp 98 °F (36 7 °C)   Resp 16   Ht 5' 1" (1 549 m)   Wt 88 kg (194 lb)   SpO2 96%   BMI 36 66 kg/m²        Physical Exam  Vitals and nursing note reviewed  Constitutional:       General: She is not in acute distress  Appearance: She is well-developed  She is not ill-appearing or toxic-appearing  HENT:      Head: Normocephalic  Eyes:      General: No scleral icterus  Conjunctiva/sclera: Conjunctivae normal    Cardiovascular:      Rate and Rhythm: Normal rate and regular rhythm  Pulmonary:      Effort: Pulmonary effort is normal  No respiratory distress  Breath sounds: No stridor  No wheezing or rales  Abdominal:      Palpations: Abdomen is soft  Musculoskeletal:         General: No deformity  Cervical back: Neck supple  Right lower leg: No edema  Left lower leg: No edema  Skin:     General: Skin is warm and dry  Coloration: Skin is not pale  Neurological:      Mental Status: She is alert  Motor: No weakness  Gait: Gait normal    Psychiatric:         Mood and Affect: Mood normal          Behavior: Behavior normal          Thought Content:  Thought content normal                 Mark Hinkle

## 2022-10-04 NOTE — TELEPHONE ENCOUNTER
Patient has covid at this present time which is making her feel very tired     She is going to try to get through upcomining MRI without any anaesthesia      Kike Gerard

## 2022-10-10 ENCOUNTER — APPOINTMENT (OUTPATIENT)
Dept: LAB | Facility: HOSPITAL | Age: 82
End: 2022-10-10
Payer: MEDICARE

## 2022-10-10 DIAGNOSIS — Z13.6 SCREENING FOR CARDIOVASCULAR CONDITION: ICD-10-CM

## 2022-10-10 DIAGNOSIS — Z13.29 SCREENING FOR THYROID DISORDER: ICD-10-CM

## 2022-10-10 LAB
ALBUMIN SERPL BCP-MCNC: 3.2 G/DL (ref 3.5–5)
ALP SERPL-CCNC: 58 U/L (ref 46–116)
ALT SERPL W P-5'-P-CCNC: 17 U/L (ref 12–78)
ANION GAP SERPL CALCULATED.3IONS-SCNC: 5 MMOL/L (ref 4–13)
AST SERPL W P-5'-P-CCNC: 14 U/L (ref 5–45)
BILIRUB SERPL-MCNC: 0.55 MG/DL (ref 0.2–1)
BUN SERPL-MCNC: 14 MG/DL (ref 5–25)
CALCIUM ALBUM COR SERPL-MCNC: 9.3 MG/DL (ref 8.3–10.1)
CALCIUM SERPL-MCNC: 8.7 MG/DL (ref 8.3–10.1)
CHLORIDE SERPL-SCNC: 105 MMOL/L (ref 96–108)
CHOLEST SERPL-MCNC: 181 MG/DL
CO2 SERPL-SCNC: 32 MMOL/L (ref 21–32)
CREAT SERPL-MCNC: 0.82 MG/DL (ref 0.6–1.3)
GFR SERPL CREATININE-BSD FRML MDRD: 67 ML/MIN/1.73SQ M
GLUCOSE P FAST SERPL-MCNC: 98 MG/DL (ref 65–99)
HDLC SERPL-MCNC: 45 MG/DL
LDLC SERPL CALC-MCNC: 119 MG/DL (ref 0–100)
NONHDLC SERPL-MCNC: 136 MG/DL
POTASSIUM SERPL-SCNC: 4.6 MMOL/L (ref 3.5–5.3)
PROT SERPL-MCNC: 7.7 G/DL (ref 6.4–8.4)
SODIUM SERPL-SCNC: 142 MMOL/L (ref 135–147)
TRIGL SERPL-MCNC: 87 MG/DL
TSH SERPL DL<=0.05 MIU/L-ACNC: 3.1 UIU/ML (ref 0.45–4.5)

## 2022-10-10 PROCEDURE — 80053 COMPREHEN METABOLIC PANEL: CPT

## 2022-10-10 PROCEDURE — 36415 COLL VENOUS BLD VENIPUNCTURE: CPT

## 2022-10-10 PROCEDURE — 84443 ASSAY THYROID STIM HORMONE: CPT

## 2022-10-10 PROCEDURE — 80061 LIPID PANEL: CPT

## 2022-10-13 ENCOUNTER — HOSPITAL ENCOUNTER (OUTPATIENT)
Dept: RADIOLOGY | Facility: HOSPITAL | Age: 82
Discharge: HOME | End: 2022-10-13
Payer: MEDICARE

## 2022-10-13 DIAGNOSIS — R22.9 SOFT TISSUE SWELLING: ICD-10-CM

## 2022-10-13 DIAGNOSIS — H02.401 PTOSIS OF RIGHT EYELID: ICD-10-CM

## 2022-10-13 DIAGNOSIS — H35.369: ICD-10-CM

## 2022-10-13 PROCEDURE — 70553 MRI BRAIN STEM W/O & W/DYE: CPT

## 2022-10-13 PROCEDURE — A9585 GADOBUTROL INJECTION: HCPCS | Performed by: NURSE PRACTITIONER

## 2022-10-13 PROCEDURE — 70543 MRI ORBT/FAC/NCK W/O &W/DYE: CPT

## 2022-10-13 PROCEDURE — G1004 CDSM NDSC: HCPCS

## 2022-10-13 RX ADMIN — GADOBUTROL 9 ML: 604.72 INJECTION INTRAVENOUS at 09:28

## 2022-10-18 ENCOUNTER — HOSPITAL ENCOUNTER (OUTPATIENT)
Dept: RADIOLOGY | Facility: HOSPITAL | Age: 82
Discharge: HOME | End: 2022-10-18
Payer: MEDICARE

## 2022-10-18 VITALS — WEIGHT: 194 LBS | BODY MASS INDEX: 36.63 KG/M2 | HEIGHT: 61 IN

## 2022-10-18 DIAGNOSIS — Z78.0 ASYMPTOMATIC MENOPAUSAL STATE: ICD-10-CM

## 2022-10-18 DIAGNOSIS — Z12.31 ENCOUNTER FOR SCREENING MAMMOGRAM FOR MALIGNANT NEOPLASM OF BREAST: ICD-10-CM

## 2022-10-18 PROCEDURE — 77063 BREAST TOMOSYNTHESIS BI: CPT

## 2022-10-18 PROCEDURE — 77080 DXA BONE DENSITY AXIAL: CPT

## 2022-10-18 PROCEDURE — 77067 SCR MAMMO BI INCL CAD: CPT

## 2022-12-29 ENCOUNTER — OFFICE VISIT (OUTPATIENT)
Dept: PULMONOLOGY | Facility: MEDICAL CENTER | Age: 82
End: 2022-12-29

## 2022-12-29 VITALS
DIASTOLIC BLOOD PRESSURE: 72 MMHG | HEART RATE: 63 BPM | HEIGHT: 61 IN | RESPIRATION RATE: 12 BRPM | SYSTOLIC BLOOD PRESSURE: 118 MMHG | WEIGHT: 195.2 LBS | TEMPERATURE: 97.7 F | BODY MASS INDEX: 36.85 KG/M2 | OXYGEN SATURATION: 97 %

## 2022-12-29 DIAGNOSIS — J45.20 MILD INTERMITTENT ASTHMA WITHOUT COMPLICATION: Primary | ICD-10-CM

## 2022-12-29 DIAGNOSIS — Z86.16 HISTORY OF COVID-19: ICD-10-CM

## 2022-12-29 RX ORDER — LEVALBUTEROL TARTRATE 45 UG/1
1-2 AEROSOL, METERED ORAL EVERY 4 HOURS PRN
Qty: 15 G | Refills: 6 | Status: SHIPPED | OUTPATIENT
Start: 2022-12-29

## 2022-12-29 NOTE — PROGRESS NOTES
Assessment/Plan        Problem List Items Addressed This Visit        Respiratory    Mild intermittent asthma without complication - Primary     Spirometry today shows normal lung volumes  Not having any problem with her asthma now  She does have levalbuterol inhaler she can use as needed         Relevant Orders    POCT spirometry (Completed)       Other    History of COVID-19     He did have mild COVID infection in early of October this year  Not having any residual effects since then  Cc:  Had COVID infection in October      HPI     Woody Sherman had COVID infection in early October of this year  She was not feeling well  Had nasal congestion cough body aches  She did home test which was positive  Also her  had COVID infection as well  She had some mild shortness of breath at times with this but this has resolved  She now feels like she is back to herself  She had recent problems in July of this year with right proptosis but this is better  Woody Sherman does have mild intermittent asthma and uses Xopenex inhaler periodically  She is able to tolerate this  Past used Atrovent inhaler  Have some mild seasonal allergies  Also has some mild GEORGINA but did not want to pursue CPAP therapy        Past Medical History:   Diagnosis Date   • Anxiety    • Benign essential hypertension 5/8/2014   • Bleeding from varicose vein    • Environmental allergies    • Fracture of radius    • Obesity    • Scoliosis     mild    • Vaginitis    • Vulvovaginitis        Past Surgical History:   Procedure Laterality Date   • EYE SURGERY      cataract   • TUBAL LIGATION           Current Outpatient Medications:   •  albuterol (Ventolin HFA) 90 mcg/act inhaler, Inhale 2 puffs every 6 (six) hours as needed for wheezing, Disp: 18 g, Rfl: 6  •  aspirin 81 mg chewable tablet, Chew 1 tablet, Disp: , Rfl:   •  B COMPLEX VITAMINS PO, Take by mouth, Disp: , Rfl:   •  fluticasone (FLONASE) 50 mcg/act nasal spray, 2 sprays into each nostril daily (Patient taking differently: 2 sprays into each nostril as needed), Disp: 1 g, Rfl: 6  •  nystatin-triamcinolone (MYCOLOG-II) cream, Apply topically 2 (two) times a day, Disp: 60 g, Rfl: 1  •  zinc gluconate 50 mg tablet, Take 50 mg by mouth if needed, Disp: , Rfl:   •  levalbuterol (Xopenex HFA) 45 mcg/act inhaler, Inhale 1-2 puffs every 4 (four) hours as needed for wheezing, Disp: 15 g, Rfl: 6    Allergies   Allergen Reactions   • Penicillins    • Sulfa Antibiotics        Social History     Tobacco Use   • Smoking status: Never   • Smokeless tobacco: Never   Substance Use Topics   • Alcohol use: Yes     Comment: social          Family History   Problem Relation Age of Onset   • Stroke Mother    • Colon cancer Maternal Aunt        Review of Systems   Constitutional: Negative for chills, fever and unexpected weight change  HENT: Negative for congestion, rhinorrhea and sore throat  Eyes: Negative for discharge and redness  Respiratory:        Has occasional shortness of breath with activity   Cardiovascular: Negative for chest pain, palpitations and leg swelling  Gastrointestinal: Negative for abdominal distention, abdominal pain and nausea  Endocrine: Negative for polydipsia and polyphagia  Genitourinary: Negative for dysuria  Musculoskeletal: Negative for joint swelling and myalgias  Skin: Negative for rash  Neurological: Negative for light-headedness  Psychiatric/Behavioral: Negative for decreased concentration  Vitals:    12/29/22 0957   BP: 118/72   Pulse: 63   Resp: 12   Temp: 97 7 °F (36 5 °C)   SpO2: 97%     Height: 5' 1" (154 9 cm)     Body mass index is 36 88 kg/m²  Weight (last 2 days)     None              Physical Exam  Vitals reviewed  Constitutional:       General: She is not in acute distress  Appearance: Normal appearance  She is well-developed  HENT:      Head: Normocephalic        Right Ear: External ear normal       Left Ear: External ear normal       Nose: Nose normal       Mouth/Throat:      Mouth: Mucous membranes are moist       Pharynx: Oropharynx is clear  No oropharyngeal exudate  Eyes:      Conjunctiva/sclera: Conjunctivae normal       Pupils: Pupils are equal, round, and reactive to light  Cardiovascular:      Rate and Rhythm: Normal rate and regular rhythm  Heart sounds: Normal heart sounds  Pulmonary:      Effort: Pulmonary effort is normal       Comments: Lung sounds are clear  No wheezes, crackles or rhonchi  Abdominal:      General: There is no distension  Palpations: Abdomen is soft  Tenderness: There is no abdominal tenderness  Musculoskeletal:      Cervical back: Neck supple  Comments: No edema, cyanosis or clubbing   Lymphadenopathy:      Cervical: No cervical adenopathy  Skin:     General: Skin is warm and dry  Neurological:      General: No focal deficit present  Mental Status: She is alert and oriented to person, place, and time     Psychiatric:         Mood and Affect: Mood normal          Behavior: Behavior normal        Spirometry testing done today    FVC -  1 86 L   84%  FEV1 - 1 50 L   92%  FEV1/FVC% - 80%    Normal lung volumes

## 2022-12-29 NOTE — PATIENT INSTRUCTIONS
Try Levalbuterol (Xopenex)  2 puffs every 4 hours as needed  If palpitations decrease to 1 puff or use ipratropium inhaler    Call office and let me know if you have ipratropium inhaler at home      Backline  [phone]

## 2023-01-01 PROBLEM — Z86.16 HISTORY OF COVID-19: Status: ACTIVE | Noted: 2023-01-01

## 2023-01-02 NOTE — ASSESSMENT & PLAN NOTE
He did have mild COVID infection in early of October this year  Not having any residual effects since then

## 2023-01-02 NOTE — ASSESSMENT & PLAN NOTE
Spirometry today shows normal lung volumes  Not having any problem with her asthma now    She does have levalbuterol inhaler she can use as needed

## 2023-06-15 ENCOUNTER — OFFICE VISIT (OUTPATIENT)
Dept: FAMILY MEDICINE CLINIC | Facility: CLINIC | Age: 83
End: 2023-06-15
Payer: MEDICARE

## 2023-06-15 VITALS
SYSTOLIC BLOOD PRESSURE: 158 MMHG | DIASTOLIC BLOOD PRESSURE: 70 MMHG | RESPIRATION RATE: 16 BRPM | WEIGHT: 203 LBS | HEART RATE: 70 BPM | BODY MASS INDEX: 38.36 KG/M2 | TEMPERATURE: 98 F

## 2023-06-15 DIAGNOSIS — N30.01 ACUTE CYSTITIS WITH HEMATURIA: Primary | ICD-10-CM

## 2023-06-15 DIAGNOSIS — N93.9 ABNORMAL UTERINE BLEEDING: ICD-10-CM

## 2023-06-15 PROCEDURE — 87186 SC STD MICRODIL/AGAR DIL: CPT | Performed by: INTERNAL MEDICINE

## 2023-06-15 PROCEDURE — 87077 CULTURE AEROBIC IDENTIFY: CPT | Performed by: INTERNAL MEDICINE

## 2023-06-15 PROCEDURE — 99213 OFFICE O/P EST LOW 20 MIN: CPT | Performed by: INTERNAL MEDICINE

## 2023-06-15 PROCEDURE — 87086 URINE CULTURE/COLONY COUNT: CPT | Performed by: INTERNAL MEDICINE

## 2023-06-15 RX ORDER — CIPROFLOXACIN 250 MG/1
250 TABLET, FILM COATED ORAL EVERY 12 HOURS SCHEDULED
Qty: 6 TABLET | Refills: 0 | Status: SHIPPED | OUTPATIENT
Start: 2023-06-15 | End: 2023-06-18

## 2023-06-15 NOTE — PROGRESS NOTES
[facility]  Name: Davin Lee      : 1940      MRN: [id number]  Encounter Provider: Ligia Monzon MD  Encounter Date: 6/15/2023   Encounter department: [facility]     1  Acute cystitis with hematuria  Comments:  She feels these are her symptoms of UTI, has had many times in the past and states that these are her usual symptoms  She was unable to give a urine today so was given orders to go to the lab with her collection  I did order presumptive antibiotics but she will wait to start these until after her urine is collected  Orders:  -     Urine culture  -     UA (URINE) with reflex to Scope  -     ciprofloxacin (CIPRO) 250 mg tablet; Take 1 tablet (250 mg total) by mouth every 12 (twelve) hours for 3 days    2  Abnormal uterine bleeding  Comments:  Due to brownish discharge will check US to r/o endometrial abnormality  Orders:  -     US pelvis complete non OB; Future; Expected date: 06/15/2023           Subjective      She has right sided pelvic pressure going into groin for about 10 days  This is a usual symptom with her UTI's, she has had many  She has a brownish discharge on her pad for the past 10 days  Review of Systems   Constitutional: Negative  Respiratory: Negative  Cardiovascular: Negative  Genitourinary: Positive for pelvic pain and vaginal discharge         Current Outpatient Medications on File Prior to Visit   Medication Sig   • albuterol (Ventolin HFA) 90 mcg/act inhaler Inhale 2 puffs every 6 (six) hours as needed for wheezing   • aspirin 81 mg chewable tablet Chew 1 tablet   • B COMPLEX VITAMINS PO Take by mouth   • fluticasone (FLONASE) 50 mcg/act nasal spray 2 sprays into each nostril daily (Patient taking differently: 2 sprays into each nostril as needed)   • levalbuterol (Xopenex HFA) 45 mcg/act inhaler Inhale 1-2 puffs every 4 (four) hours as needed for wheezing   • nystatin-triamcinolone (MYCOLOG-II) cream Apply topically 2 (two) times a day   • zinc gluconate 50 mg tablet Take 50 mg by mouth if needed       Objective     /70   Pulse 70   Temp 98 °F (36 7 °C)   Resp 16   Wt 92 1 kg (203 lb)   BMI 38 36 kg/m²     Physical Exam  Constitutional:       Appearance: She is well-developed  Eyes:      Conjunctiva/sclera: Conjunctivae normal    Neck:      Thyroid: No thyromegaly  Vascular: No JVD  Cardiovascular:      Rate and Rhythm: Normal rate and regular rhythm  Heart sounds: Normal heart sounds  No murmur heard  No friction rub  No gallop  Pulmonary:      Effort: Pulmonary effort is normal       Breath sounds: Normal breath sounds  No wheezing or rales  Abdominal:      General: Bowel sounds are normal  There is no distension  Palpations: Abdomen is soft  Tenderness: There is abdominal tenderness in the suprapubic area  Musculoskeletal:      Cervical back: Neck supple         Sylvia Aguiar MD

## 2023-06-16 ENCOUNTER — TELEPHONE (OUTPATIENT)
Dept: FAMILY MEDICINE CLINIC | Facility: CLINIC | Age: 83
End: 2023-06-16

## 2023-06-16 NOTE — TELEPHONE ENCOUNTER
[facility] lab called stating that for the patients urine culture sent yesterday, they only received a sample in the tube with grey top  They stated that they cancelled the urinalysis that was ordered  Would you like to reorder the urinalysis and we can tell the patient to go to the lab for that?     Jose Quezada, REYNALDON

## 2023-06-18 LAB — BACTERIA UR CULT: ABNORMAL

## 2023-06-23 ENCOUNTER — TELEPHONE (OUTPATIENT)
Dept: FAMILY MEDICINE CLINIC | Facility: CLINIC | Age: 83
End: 2023-06-23

## 2023-06-23 ENCOUNTER — HOSPITAL ENCOUNTER (OUTPATIENT)
Dept: RADIOLOGY | Facility: HOSPITAL | Age: 83
Discharge: HOME | End: 2023-06-23
Attending: INTERNAL MEDICINE
Payer: MEDICARE

## 2023-06-23 DIAGNOSIS — N93.9 ABNORMAL UTERINE BLEEDING: ICD-10-CM

## 2023-06-23 DIAGNOSIS — N30.01 ACUTE CYSTITIS WITH HEMATURIA: Primary | ICD-10-CM

## 2023-06-23 PROCEDURE — 76830 TRANSVAGINAL US NON-OB: CPT

## 2023-06-23 PROCEDURE — 76856 US EXAM PELVIC COMPLETE: CPT

## 2023-06-23 NOTE — TELEPHONE ENCOUNTER
Would you please review this message, since Dr Sara Cooney won't see this until Monday?  Thanks [employer]

## 2023-06-23 NOTE — TELEPHONE ENCOUNTER
Attempted to call patient   No and answer and could not leave a voicemail, try again later     Stiven Ayala

## 2023-06-23 NOTE — TELEPHONE ENCOUNTER
She does have a UTI and the antibiotic Dr Sara Cooney gave her is good to treat it  If she continues to have symptoms, I can extend the course of the ciprofloxacin

## 2023-06-26 RX ORDER — CIPROFLOXACIN 250 MG/1
250 TABLET, FILM COATED ORAL EVERY 12 HOURS SCHEDULED
Qty: 10 TABLET | Refills: 0 | Status: SHIPPED | OUTPATIENT
Start: 2023-06-26 | End: 2023-07-01

## 2023-06-28 NOTE — TELEPHONE ENCOUNTER
Patient informed and wanted to let you know she is having a colonoscopy done this Friday @  d/t rectal bleeding with Dr Melania Schroeder/MICHELLE

## 2023-06-29 RX ORDER — LIDOCAINE HYDROCHLORIDE 10 MG/ML
0.5 INJECTION, SOLUTION EPIDURAL; INFILTRATION; INTRACAUDAL; PERINEURAL ONCE AS NEEDED
Status: CANCELLED | OUTPATIENT
Start: 2023-06-29

## 2023-06-29 RX ORDER — SODIUM CHLORIDE, SODIUM LACTATE, POTASSIUM CHLORIDE, CALCIUM CHLORIDE 600; 310; 30; 20 MG/100ML; MG/100ML; MG/100ML; MG/100ML
125 INJECTION, SOLUTION INTRAVENOUS CONTINUOUS
Status: CANCELLED | OUTPATIENT
Start: 2023-06-29

## 2023-06-30 ENCOUNTER — ANESTHESIA EVENT (OUTPATIENT)
Dept: GASTROENTEROLOGY | Facility: HOSPITAL | Age: 83
End: 2023-06-30

## 2023-06-30 ENCOUNTER — ANESTHESIA (OUTPATIENT)
Dept: GASTROENTEROLOGY | Facility: HOSPITAL | Age: 83
End: 2023-06-30

## 2023-06-30 ENCOUNTER — HOSPITAL ENCOUNTER (OUTPATIENT)
Dept: GASTROENTEROLOGY | Facility: HOSPITAL | Age: 83
Setting detail: OUTPATIENT SURGERY
Discharge: HOME | End: 2023-06-30
Attending: COLON & RECTAL SURGERY | Admitting: COLON & RECTAL SURGERY
Payer: MEDICARE

## 2023-06-30 VITALS
WEIGHT: 195.8 LBS | HEIGHT: 62 IN | OXYGEN SATURATION: 96 % | HEART RATE: 72 BPM | TEMPERATURE: 97.1 F | RESPIRATION RATE: 18 BRPM | BODY MASS INDEX: 36.03 KG/M2 | DIASTOLIC BLOOD PRESSURE: 61 MMHG | SYSTOLIC BLOOD PRESSURE: 137 MMHG

## 2023-06-30 DIAGNOSIS — K92.2 GASTROINTESTINAL HEMORRHAGE, UNSPECIFIED: ICD-10-CM

## 2023-06-30 RX ORDER — SODIUM CHLORIDE, SODIUM LACTATE, POTASSIUM CHLORIDE, CALCIUM CHLORIDE 600; 310; 30; 20 MG/100ML; MG/100ML; MG/100ML; MG/100ML
125 INJECTION, SOLUTION INTRAVENOUS CONTINUOUS
Status: DISCONTINUED | OUTPATIENT
Start: 2023-06-30 | End: 2023-07-04 | Stop reason: HOSPADM

## 2023-06-30 RX ORDER — PROPOFOL 10 MG/ML
INJECTION, EMULSION INTRAVENOUS AS NEEDED
Status: DISCONTINUED | OUTPATIENT
Start: 2023-06-30 | End: 2023-06-30

## 2023-06-30 RX ORDER — LIDOCAINE HYDROCHLORIDE 10 MG/ML
0.5 INJECTION, SOLUTION EPIDURAL; INFILTRATION; INTRACAUDAL; PERINEURAL ONCE AS NEEDED
Status: COMPLETED | OUTPATIENT
Start: 2023-06-30 | End: 2023-06-30

## 2023-06-30 RX ORDER — GLYCOPYRROLATE 0.2 MG/ML
INJECTION INTRAMUSCULAR; INTRAVENOUS AS NEEDED
Status: DISCONTINUED | OUTPATIENT
Start: 2023-06-30 | End: 2023-06-30

## 2023-06-30 RX ADMIN — PROPOFOL 30 MG: 10 INJECTION, EMULSION INTRAVENOUS at 08:54

## 2023-06-30 RX ADMIN — PROPOFOL 30 MG: 10 INJECTION, EMULSION INTRAVENOUS at 08:49

## 2023-06-30 RX ADMIN — PROPOFOL 30 MG: 10 INJECTION, EMULSION INTRAVENOUS at 08:45

## 2023-06-30 RX ADMIN — PROPOFOL 30 MG: 10 INJECTION, EMULSION INTRAVENOUS at 08:38

## 2023-06-30 RX ADMIN — GLYCOPYRROLATE 0.2 MCG: 0.2 INJECTION, SOLUTION INTRAMUSCULAR; INTRAVENOUS at 08:45

## 2023-06-30 RX ADMIN — PROPOFOL 30 MG: 10 INJECTION, EMULSION INTRAVENOUS at 08:41

## 2023-06-30 RX ADMIN — PROPOFOL 20 MG: 10 INJECTION, EMULSION INTRAVENOUS at 08:30

## 2023-06-30 RX ADMIN — SODIUM CHLORIDE, SODIUM LACTATE, POTASSIUM CHLORIDE, AND CALCIUM CHLORIDE: .6; .31; .03; .02 INJECTION, SOLUTION INTRAVENOUS at 07:40

## 2023-06-30 RX ADMIN — PROPOFOL 80 MG: 10 INJECTION, EMULSION INTRAVENOUS at 08:27

## 2023-06-30 RX ADMIN — PROPOFOL 30 MG: 10 INJECTION, EMULSION INTRAVENOUS at 08:34

## 2023-06-30 RX ADMIN — LIDOCAINE HYDROCHLORIDE 30 ML: 10 INJECTION, SOLUTION EPIDURAL; INFILTRATION; INTRACAUDAL at 08:27

## 2023-06-30 NOTE — ANESTHESIA POSTPROCEDURE EVALUATION
Post-Op Assessment Note    CV Status:  Stable  Pain Score: 0    Pain management: adequate     Mental Status:  Alert and awake   Hydration Status:  Stable   PONV Controlled:  None   Airway Patency:  Patent      Post Op Vitals Reviewed: Yes      Staff: CRNA         No notable events documented      BP   126/59   Temp  97 7   Pulse  80   Resp  18    SpO2   96

## 2023-06-30 NOTE — ANESTHESIA PREPROCEDURE EVALUATION
Procedure:  COLONOSCOPY    Relevant Problems   CARDIO   (+) Left-sided chest wall pain      MUSCULOSKELETAL   (+) Generalized osteoarthritis of multiple sites      NEURO/PSYCH   (+) Chronic neck pain      PULMONARY   (+) Mild intermittent asthma without complication   (+) Obstructive sleep apnea   (+) SOB (shortness of breath)        Physical Exam    Airway    Mallampati score: II  TM Distance: >3 FB  Neck ROM: full     Dental       Cardiovascular  Rhythm: regular, Rate: normal, Cardiovascular exam normal    Pulmonary  Pulmonary exam normal Breath sounds clear to auscultation,     Other Findings        Anesthesia Plan  ASA Score- 3     Anesthesia Type- IV sedation with anesthesia with ASA Monitors  Additional Monitors:   Airway Plan:     Comment: Discussed risks/benefits, including medication reactions, awareness, aspiration, and serious/life threatening complications  Plan to maintain native airway with IVGA, monitored with EtCO2  Plan Factors-Exercise tolerance (METS): >4 METS  Patient summary reviewed  Patient instructed to abstain from smoking on day of procedure  Patient did not smoke on day of surgery  Induction- intravenous  Postoperative Plan-     Informed Consent- Anesthetic plan and risks discussed with patient  I personally reviewed this patient with the CRNA  Discussed and agreed on the Anesthesia Plan with the CRNA  Boris Horan

## 2023-06-30 NOTE — H&P
History and Physical - [facility]  Clark Kelley 81 y.o. female MRN: [id number]                  HPI: Clark Kelley is a 81y.o. year old female who presents for colonoscopy      REVIEW OF SYSTEMS: Per the HPI, and otherwise unremarkable.     Historical Information   Past Medical History:   Diagnosis Date   • Anxiety    • Benign essential hypertension 5/8/2014   • Bleeding from varicose vein    • Environmental allergies    • Fracture of radius    • Obesity    • Scoliosis     mild    • Vaginitis    • Vulvovaginitis      Past Surgical History:   Procedure Laterality Date   • EYE SURGERY      cataract   • TUBAL LIGATION       Social History   Social History     Substance and Sexual Activity   Alcohol Use Yes    Comment: social      Social History     Substance and Sexual Activity   Drug Use No     Social History     Tobacco Use   Smoking Status Never   Smokeless Tobacco Never     Family History   Problem Relation Age of Onset   • Stroke Mother    • Colon cancer Maternal Aunt        Meds/Allergies       Current Outpatient Medications:   •  ciprofloxacin (CIPRO) 250 mg tablet  •  fluticasone (FLONASE) 50 mcg/act nasal spray  •  nystatin-triamcinolone (MYCOLOG-II) cream  •  zinc gluconate 50 mg tablet  •  albuterol (Ventolin HFA) 90 mcg/act inhaler  •  aspirin 81 mg chewable tablet  •  B COMPLEX VITAMINS PO  •  levalbuterol (Xopenex HFA) 45 mcg/act inhaler    Current Facility-Administered Medications:   •  lactated ringers infusion, 125 mL/hr, Intravenous, Continuous  •  lidocaine (PF) (XYLOCAINE-MPF) 1 % injection 0.5 mL, 0.5 mL, Infiltration, Once PRN    Allergies   Allergen Reactions   • Penicillins    • Sulfa Antibiotics        Objective     /75   Pulse 63   Temp (!) 97.4 °F (36.3 °C) (Temporal)   Resp 18   Ht 5' 2" (1.575 m)   Wt 88.8 kg (195 lb 12.8 oz)   SpO2 94%   BMI 35.81 kg/m²       PHYSICAL EXAM    Gen: NAD  Head: NCAT  CV: RRR  CHEST: Clear  ABD: soft, NT/ND  EXT: no edema      ASSESSMENT/PLAN:  This is a 81y.o. year old female here for colonoscopy and she is stable and optimized for her procedure.

## 2023-07-05 ENCOUNTER — TELEPHONE (OUTPATIENT)
Dept: FAMILY MEDICINE CLINIC | Facility: CLINIC | Age: 83
End: 2023-07-05

## 2023-07-05 DIAGNOSIS — R93.89 ABNORMAL PELVIC ULTRASOUND: Primary | ICD-10-CM

## 2023-07-05 NOTE — TELEPHONE ENCOUNTER
I called and lMOM for patient to call back regarding ultrasound. She has some fluid in the uterus which is abnormal for age. She should see the gynecologist.  Please give information for Dr. Kev Toussaint.

## 2023-07-06 ENCOUNTER — TELEPHONE (OUTPATIENT)
Dept: OBGYN CLINIC | Facility: CLINIC | Age: 83
End: 2023-07-06

## 2023-07-06 NOTE — TELEPHONE ENCOUNTER
Left msg on machine for a call back.  Dr. Devries Drop [phone]; [address]  [place], [place]

## 2023-07-06 NOTE — TELEPHONE ENCOUNTER
Pt called back, gave her the message along with the name and address of Dr. Sigrid Maciel. Please place a referral in her chart.   SAMINA Castillo

## 2023-07-06 NOTE — TELEPHONE ENCOUNTER
Pt called and said she was referred by Dr. Imelda Luna to schedule an appt with Dr. Grace Kimball for Abnormal pelvic ultrasound. She would like a call back.

## 2023-07-10 ENCOUNTER — TELEPHONE (OUTPATIENT)
Dept: FAMILY MEDICINE CLINIC | Facility: CLINIC | Age: 83
End: 2023-07-10

## 2023-07-10 NOTE — TELEPHONE ENCOUNTER
T/c from patient was referred by Dr. Alfonso Cervantes for Abnormal Uterine bleeding , offered her to see another provider other than Dr. Roxana Shaikh would prefer not only Dr. Roxana Shaikh as that is who Dr. José Bravo, please review US imaging , has been having the abnormal uterine since 06/15/2023, I have offered appointments with other Providers in any one of our offices , patient states traveling is not an options .  CLINTON CMA

## 2023-07-10 NOTE — TELEPHONE ENCOUNTER
Pt would like Dr. Casandra Whittington to call her back concerning her health she stated, would not give anymore information.

## 2023-07-13 ENCOUNTER — OFFICE VISIT (OUTPATIENT)
Dept: OBGYN CLINIC | Facility: CLINIC | Age: 83
End: 2023-07-13
Payer: MEDICARE

## 2023-07-13 VITALS
WEIGHT: 199.8 LBS | BODY MASS INDEX: 36.77 KG/M2 | DIASTOLIC BLOOD PRESSURE: 66 MMHG | HEIGHT: 62 IN | SYSTOLIC BLOOD PRESSURE: 142 MMHG

## 2023-07-13 DIAGNOSIS — N95.0 POSTMENOPAUSAL BLEEDING: ICD-10-CM

## 2023-07-13 DIAGNOSIS — R93.89 ABNORMAL PELVIC ULTRASOUND: ICD-10-CM

## 2023-07-13 DIAGNOSIS — N88.2 CERVICAL STENOSIS (UTERINE CERVIX): Primary | ICD-10-CM

## 2023-07-13 PROCEDURE — 99203 OFFICE O/P NEW LOW 30 MIN: CPT | Performed by: OBSTETRICS & GYNECOLOGY

## 2023-07-13 RX ORDER — MISOPROSTOL 200 UG/1
200 TABLET ORAL DAILY
Qty: 1 TABLET | Refills: 0 | Status: SHIPPED | OUTPATIENT
Start: 2023-07-13

## 2023-07-13 NOTE — PROGRESS NOTES
Assessment/Plan:     There are no diagnoses linked to this encounter. 56-year-old female  Postmenopausal bleeding/vaginal discharge  History of diverticulitis  Post menopause  Chronic hypertension  History of tubal ligation  Ultrasound showed fluid within the endometrial cavity  Stenotic cervix unable to do office EMB  Plan  Finding reviewed and discussed with patient as patient was having brown vaginal discharge for more than 18 months new onset of postmenopausal bleeding fluid within the endometrial cavity recommend evaluation of the endometrium cavity recommend to proceed with hysteroscopy D&C procedure explained and discussed with patient risk of bleeding infection trauma perforation need another surgery risk of anesthesia and risk of blood transfusion reviewed and discussed with patient all patient questions answered and patient was satisfied  Cytotec vaginally the night prior to the procedure          Subjective:      Patient ID: Valentina Diaz is a 81 y.o. female. HPI  80 yo female presents to the office today secondary to new onset postmenopausal bleeding  Patient also was having brown vaginal discharge on and off for the last 18 months  Was seen in 6/15 for UTI told she has hematuria   Then was c/o brown vaginal discharge   Had U/S order then had large blood clots noted   Had also h/o bloody stool had diverticulits   And mild stenosis of the bowel   Still c/o vaginal spotting  N/A    The following portions of the patient's history were reviewed and updated as appropriate: allergies, current medications, past family history, past medical history, past social history, past surgical history and problem list.    Review of Systems      Objective:      /66 (BP Location: Left arm, Patient Position: Sitting, Cuff Size: Adult)   Ht 5' 2" (1.575 m)   Wt 90.6 kg (199 lb 12.8 oz)   BMI 36.54 kg/m²     UTERUS:  The uterus is anteverted in position, measuring 6.7 x 2.2 x 4.6 cm.   The uterus has a normal contour and echotexture. Complex appearing fluid in the endocervical canal with echogenic areas.     ENDOMETRIUM:  The endometrial echo complex has an AP caliber of 1.7 mm. Trace fluid.     OVARIES/ADNEXA:  Right ovary:  2.0 x 1.7 x 1.1 cm. 2.0 mL. Left ovary:  1.8 x 1.2 x 1.2 cm. 1.3 mL. Ovarian Doppler flow is within normal limits. No suspicious ovarian or adnexal abnormality.     OTHER:  No free fluid or loculated fluid collections.        IMPRESSION:  Complex appearing fluid in the endocervical canal with echogenic areas. Trace fluid in the endometrial canal.. This may correlate with history of brownish discharge. No solid mass appreciated. GYN consult with biopsy correlation if indicated clinically   given the patient's age. Physical Exam  Vitals and nursing note reviewed. Constitutional:       Appearance: Normal appearance. She is well-developed. Neck:      Thyroid: No thyroid mass or thyromegaly. Cardiovascular:      Rate and Rhythm: Regular rhythm. Heart sounds: Normal heart sounds. Pulmonary:      Breath sounds: Normal breath sounds. Abdominal:      Palpations: Abdomen is soft. Hernia: No hernia is present. Genitourinary:     Vagina: Normal. No vaginal discharge, erythema, tenderness or bleeding. Cervix: No cervical motion tenderness, discharge or friability. Uterus: Not enlarged and not tender. Adnexa:         Right: No mass or tenderness. Left: No mass or tenderness. Comments: Trial of EMB  Attempted today at the office secondary to her persistent brown vaginal discharge fluid in the endometrial cavity an episode of postmenopausal bleeding unable to proceed with procedure secondary to stenotic cervix patient desired procedure to be done under anesthesia  Skin:     General: Skin is warm and dry. Neurological:      Mental Status: She is alert and oriented to person, place, and time.

## 2023-08-15 ENCOUNTER — ANESTHESIA EVENT (OUTPATIENT)
Dept: PERIOP | Facility: HOSPITAL | Age: 83
End: 2023-08-15
Payer: MEDICARE

## 2023-08-16 RX ORDER — ACETAMINOPHEN 500 MG
500 TABLET ORAL EVERY 6 HOURS PRN
COMMUNITY

## 2023-08-16 NOTE — PRE-PROCEDURE INSTRUCTIONS
Pre-Surgery Instructions:   Medication Instructions   • acetaminophen (TYLENOL) 500 mg tablet Uses PRN- OK to take day of surgery   • albuterol (Ventolin HFA) 90 mcg/act inhaler Uses PRN- OK to take day of surgery   • B COMPLEX VITAMINS PO Stop taking 5 days prior to surgery. • fluticasone (FLONASE) 50 mcg/act nasal spray Uses PRN- OK to take day of surgery   • levalbuterol (Xopenex HFA) 45 mcg/act inhaler Uses PRN- OK to take day of surgery   • miSOPROStol (Cytotec) 200 mcg tablet Instructions provided by MD   • Naproxen Sodium (ALEVE PO) Stop taking 5 days prior to surgery. • zinc gluconate 50 mg tablet Stop taking 5 days prior to surgery. Medication instructions for day surgery reviewed. Please use only a sip of water to take your instructed medications. Avoid all over the counter vitamins, supplements and NSAIDS for one week prior to surgery per anesthesia guidelines. Tylenol is ok to take as needed. You will receive a call one business day prior to surgery with an arrival time and hospital directions. If your surgery is scheduled on a Monday, the hospital will be calling you on the Friday prior to your surgery. If you have not heard from anyone by 8pm, please call the hospital supervisor through the hospital  at [phone]. Jeff Mckeon [phone]). Do not eat or drink anything after midnight the night before your surgery, including candy, mints, lifesavers, or chewing gum. Do not drink alcohol 24hrs before your surgery. Try not to smoke at least 24hrs before your surgery. Follow the pre surgery showering instructions as listed in the [facility] Surgical Experience Booklet” or otherwise provided by your surgeon's office. Do not shave the surgical area 24 hours before surgery. Do not apply any lotions, creams, including makeup, cologne, deodorant, or perfumes after showering on the day of your surgery. No contact lenses, eye make-up, or artificial eyelashes.  Remove nail polish, including gel polish, and any artificial, gel, or acrylic nails if possible. Remove all jewelry including rings and body piercing jewelry. Wear causal clothing that is easy to take on and off. Consider your type of surgery. Keep any valuables, jewelry, piercings at home. Please bring any specially ordered equipment (sling, braces) if indicated. Arrange for a responsible person to drive you to and from the hospital on the day of your surgery. Visitor Guidelines discussed. Call the surgeon's office with any new illnesses, exposures, or additional questions prior to surgery. Please reference your [facility] Surgical Experience Booklet” for additional information to prepare for your upcoming surgery.

## 2023-08-21 ENCOUNTER — ANESTHESIA (OUTPATIENT)
Dept: PERIOP | Facility: HOSPITAL | Age: 83
End: 2023-08-21
Payer: MEDICARE

## 2023-08-21 ENCOUNTER — HOSPITAL ENCOUNTER (OUTPATIENT)
Facility: HOSPITAL | Age: 83
Setting detail: OUTPATIENT SURGERY
Discharge: HOME | End: 2023-08-21
Attending: OBSTETRICS & GYNECOLOGY | Admitting: OBSTETRICS & GYNECOLOGY
Payer: MEDICARE

## 2023-08-21 VITALS
SYSTOLIC BLOOD PRESSURE: 173 MMHG | WEIGHT: 195.6 LBS | RESPIRATION RATE: 16 BRPM | OXYGEN SATURATION: 100 % | TEMPERATURE: 97.1 F | HEART RATE: 58 BPM | BODY MASS INDEX: 35.99 KG/M2 | HEIGHT: 62 IN | DIASTOLIC BLOOD PRESSURE: 81 MMHG

## 2023-08-21 DIAGNOSIS — N95.0 PMB (POSTMENOPAUSAL BLEEDING): ICD-10-CM

## 2023-08-21 PROCEDURE — 58558 HYSTEROSCOPY BIOPSY: CPT | Performed by: OBSTETRICS & GYNECOLOGY

## 2023-08-21 PROCEDURE — 88305 TISSUE EXAM BY PATHOLOGIST: CPT | Performed by: PATHOLOGY

## 2023-08-21 PROCEDURE — NC001 PR NO CHARGE: Performed by: OBSTETRICS & GYNECOLOGY

## 2023-08-21 PROCEDURE — 88341 IMHCHEM/IMCYTCHM EA ADD ANTB: CPT | Performed by: PATHOLOGY

## 2023-08-21 PROCEDURE — 88342 IMHCHEM/IMCYTCHM 1ST ANTB: CPT | Performed by: PATHOLOGY

## 2023-08-21 RX ORDER — ALBUTEROL SULFATE 2.5 MG/3ML
2.5 SOLUTION RESPIRATORY (INHALATION) ONCE AS NEEDED
Status: DISCONTINUED | OUTPATIENT
Start: 2023-08-21 | End: 2023-08-21 | Stop reason: HOSPADM

## 2023-08-21 RX ORDER — ONDANSETRON 2 MG/ML
4 INJECTION INTRAMUSCULAR; INTRAVENOUS ONCE AS NEEDED
Status: DISCONTINUED | OUTPATIENT
Start: 2023-08-21 | End: 2023-08-21 | Stop reason: HOSPADM

## 2023-08-21 RX ORDER — FENTANYL CITRATE/PF 50 MCG/ML
25 SYRINGE (ML) INJECTION
Status: DISCONTINUED | OUTPATIENT
Start: 2023-08-21 | End: 2023-08-21 | Stop reason: HOSPADM

## 2023-08-21 RX ORDER — DEXAMETHASONE SODIUM PHOSPHATE 10 MG/ML
INJECTION, SOLUTION INTRAMUSCULAR; INTRAVENOUS AS NEEDED
Status: DISCONTINUED | OUTPATIENT
Start: 2023-08-21 | End: 2023-08-21

## 2023-08-21 RX ORDER — FENTANYL CITRATE 50 UG/ML
INJECTION, SOLUTION INTRAMUSCULAR; INTRAVENOUS AS NEEDED
Status: DISCONTINUED | OUTPATIENT
Start: 2023-08-21 | End: 2023-08-21

## 2023-08-21 RX ORDER — ONDANSETRON 2 MG/ML
INJECTION INTRAMUSCULAR; INTRAVENOUS AS NEEDED
Status: DISCONTINUED | OUTPATIENT
Start: 2023-08-21 | End: 2023-08-21

## 2023-08-21 RX ORDER — PROPOFOL 10 MG/ML
INJECTION, EMULSION INTRAVENOUS AS NEEDED
Status: DISCONTINUED | OUTPATIENT
Start: 2023-08-21 | End: 2023-08-21

## 2023-08-21 RX ORDER — SODIUM CHLORIDE, SODIUM LACTATE, POTASSIUM CHLORIDE, CALCIUM CHLORIDE 600; 310; 30; 20 MG/100ML; MG/100ML; MG/100ML; MG/100ML
INJECTION, SOLUTION INTRAVENOUS CONTINUOUS PRN
Status: DISCONTINUED | OUTPATIENT
Start: 2023-08-21 | End: 2023-08-21

## 2023-08-21 RX ORDER — LIDOCAINE HYDROCHLORIDE 10 MG/ML
INJECTION, SOLUTION EPIDURAL; INFILTRATION; INTRACAUDAL; PERINEURAL AS NEEDED
Status: DISCONTINUED | OUTPATIENT
Start: 2023-08-21 | End: 2023-08-21

## 2023-08-21 RX ADMIN — LIDOCAINE HYDROCHLORIDE 50 MG: 10 INJECTION, SOLUTION EPIDURAL; INFILTRATION; INTRACAUDAL; PERINEURAL at 09:08

## 2023-08-21 RX ADMIN — FENTANYL CITRATE 50 MCG: 50 INJECTION, SOLUTION INTRAMUSCULAR; INTRAVENOUS at 09:15

## 2023-08-21 RX ADMIN — PROPOFOL 150 MG: 10 INJECTION, EMULSION INTRAVENOUS at 09:08

## 2023-08-21 RX ADMIN — DEXAMETHASONE SODIUM PHOSPHATE 8 MG: 10 INJECTION, SOLUTION INTRAMUSCULAR; INTRAVENOUS at 09:09

## 2023-08-21 RX ADMIN — SODIUM CHLORIDE, SODIUM LACTATE, POTASSIUM CHLORIDE, AND CALCIUM CHLORIDE: .6; .31; .03; .02 INJECTION, SOLUTION INTRAVENOUS at 09:05

## 2023-08-21 RX ADMIN — FENTANYL CITRATE 50 MCG: 50 INJECTION, SOLUTION INTRAMUSCULAR; INTRAVENOUS at 09:06

## 2023-08-21 RX ADMIN — ONDANSETRON 4 MG: 2 INJECTION INTRAMUSCULAR; INTRAVENOUS at 09:09

## 2023-08-21 NOTE — ANESTHESIA PREPROCEDURE EVALUATION
Procedure:  DILATATION AND CURETTAGE (D&C) WITH HYSTEROSCOPY, POSSIBLE POLYPECTOMY (Uterus)    Relevant Problems   CARDIO   (+) Left-sided chest wall pain      MUSCULOSKELETAL   (+) Generalized osteoarthritis of multiple sites      NEURO/PSYCH   (+) Chronic neck pain      PULMONARY   (+) Mild intermittent asthma without complication   (+) Obstructive sleep apnea   (+) SOB (shortness of breath)      NM myocardial perfusion testing 12/27/2017  IMPRESSIONS: Equivocal study after pharmacologic vasodilation. There is very mild focal apical defect seen in stress images, worse in stress images. Due to focality of nature of artifact, can not rule out it as artifactual vs minimal focal  ischemia. Normal EF of 77%. Left ventricular systolic function was normal.    Bilateral carotid vascular study 2017  Impression  RIGHT:  There is <50% stenosis noted in the internal carotid artery. Plaque is  heterogenous and smooth. Vertebral artery flow is antegrade. There is no significant subclavian artery  disease. LEFT:  There is no evidence of significant stenosis noted. Vertebral artery flow is antegrade. There is no significant subclavian artery  disease. Physical Exam    Airway    Mallampati score: II  TM Distance: >3 FB  Neck ROM: full     Dental       Cardiovascular      Pulmonary      Other Findings        Anesthesia Plan  ASA Score- 3     Anesthesia Type- general with ASA Monitors. Additional Monitors:   Airway Plan: LMA. Plan Factors-    Chart reviewed. Patient summary reviewed. Patient is not a current smoker. Obstructive sleep apnea risk education given perioperatively. Induction- intravenous. Postoperative Plan-     Informed Consent- Anesthetic plan and risks discussed with patient. I personally reviewed this patient with the CRNA. Discussed and agreed on the Anesthesia Plan with the CRNA. Larissa Barnes

## 2023-08-21 NOTE — OP NOTE
OPERATIVE REPORT  PATIENT NAME: Bibiana Seymour    :  1940  MRN: [id number]  Pt Location:  OR ROOM 02    SURGERY DATE: 2023    Surgeon(s) and Role:     * Mark Garcia MD - Primary    Preop Diagnosis:  PMB (postmenopausal bleeding) [N95.0]    Post-Op Diagnosis Codes:     * PMB (postmenopausal bleeding) [N95.0]    Procedure(s):  DILATATION AND CURETTAGE (D&C) WITH HYSTEROSCOPY    Specimen(s):  ID Type Source Tests Collected by Time Destination   1 : ENDOMETRIAL CURRETTINGS Tissue Endometrium TISSUE EXAM Mark Garcia MD 2023  8:43 AM        Estimated Blood Loss:   Minimal    Drains:  * No LDAs found *    Anesthesia Type:   General    Operative Indications:  PMB (postmenopausal bleeding) [N95.0]      Operative Findings:\  Moderate cystocele noted midline  Cervix noted to be stenotic patient used Cytotec last night dilation was performed using Harley dilator very atrophic endometrium cavity with both fallopian tube ostia were visualized     Complications:   None    Procedure and Technique:  Brief History    All risks, benefits, and alternatives to the procedure were discussed with the patient and she had the opportunity to ask questions. Informed consent was obtained. Description of Procedure    Patient was taken to the operating room were a time out was performed to confirm correct patient and correct procedure. General LMA anesthesia (LMA) was administered and the patient was positioned on the OR table in the dorsal lithotomy position. All pressure points were padded and a rob hugger was placed to maintain control of core body temperature. A bimanual exam was performed and the uterus was noted to be anteverted, normal in size and consistency with no palpable adnexal masses or fullness. The patient was prepped and draped in the usual sterile fashion. Operative Technique    Patient voided prior going to the OR.  A Mehta retractor was inserted into the vagina and [place] retractor was used to visualize the anterior lip of the cervix, which was then grasped with an Allis clamp. Moderate cystocele noted  Cervix noted to be stenotic cervical dilation was performed using Ramos dilators mucousy discharge noted from the cervix   The uterus was sounded to 6cm. The cervix was serially dilated  using ramos dilators for introduction of the hysteroscope. Hysteroscope was introduced under direct visualization using normal saline solution as the distention media. Hysteroscope was advanced to the uterine fundus and the entire uterine cavity was inspected in a systematic manner. There was noted to be very atrophic cavity with both fallopian tube ostia were visualized . Hysteroscope was withdrawn and sharp curetting was performed, starting at the 12'oclock position and rotating a total of 360 degrees to cover all surfaces. Scant endometrial tissue was obtained and sent for pathology. The Allis clamp was removed from the anterior lip of the cervix. Good hemostasis was confirmed, retractor removed from the vagina. Patient moved to the dorsal supine position. At the conclusion of the procedure, all needle, sponge, and instrument counts were noted to be correct x2. Patient tolerated the procedure well and was transferred to PACU in stable condition prior to discharge with follow up in 1-2 weeks   I was present for the entire procedure.     Patient Disposition:  PACU         SIGNATURE: Faviola Carlton MD  DATE: August 21, 2023  TIME: 9:31 AM

## 2023-08-21 NOTE — H&P
78-year-old female  Postmenopausal bleeding/vaginal discharge  History of diverticulitis  Post menopause  Chronic hypertension  History of tubal ligation  Ultrasound showed fluid within the endometrial cavity  Stenotic cervix unable to do office EMB  Plan  Finding reviewed and discussed with patient as patient was having brown vaginal discharge for more than 18 months new onset of postmenopausal bleeding fluid within the endometrial cavity recommend evaluation of the endometrium cavity recommend to proceed with hysteroscopy D&C procedure explained and discussed with patient risk of bleeding infection trauma perforation need another surgery risk of anesthesia and risk of blood transfusion reviewed and discussed with patient all patient questions answered and patient was satisfied  Cytotec vaginally the night prior to the procedure              Subjective:       Patient ID: Gopi Hutchinson is a 81 y.o. female.     HPI  82 yo female presents to the office today secondary to new onset postmenopausal bleeding  Patient also was having brown vaginal discharge on and off for the last 18 months  Was seen in 6/15 for UTI told she has hematuria   Then was c/o brown vaginal discharge   Had U/S order then had large blood clots noted   Had also h/o bloody stool had diverticulits   And mild stenosis of the bowel   Still c/o vaginal spotting  N/A     The following portions of the patient's history were reviewed and updated as appropriate: allergies, current medications, past family history, past medical history, past social history, past surgical history and problem list.     Review of Systems       Objective:        /66 (BP Location: Left arm, Patient Position: Sitting, Cuff Size: Adult)   Ht 5' 2" (1.575 m)   Wt 90.6 kg (199 lb 12.8 oz)   BMI 36.54 kg/m²      UTERUS:  The uterus is anteverted in position, measuring 6.7 x 2.2 x 4.6 cm. The uterus has a normal contour and echotexture.   Complex appearing fluid in the endocervical canal with echogenic areas.     ENDOMETRIUM:  The endometrial echo complex has an AP caliber of 1.7 mm. Trace fluid.     OVARIES/ADNEXA:  Right ovary:  2.0 x 1.7 x 1.1 cm. 2.0 mL. Left ovary:  1.8 x 1.2 x 1.2 cm. 1.3 mL. Ovarian Doppler flow is within normal limits. No suspicious ovarian or adnexal abnormality.     OTHER:  No free fluid or loculated fluid collections.        IMPRESSION:  Complex appearing fluid in the endocervical canal with echogenic areas. Trace fluid in the endometrial canal.. This may correlate with history of brownish discharge. No solid mass appreciated. GYN consult with biopsy correlation if indicated clinically   given the patient's age.      Physical Exam  Vitals and nursing note reviewed. Constitutional:       Appearance: Normal appearance. She is well-developed. Neck:      Thyroid: No thyroid mass or thyromegaly. Cardiovascular:      Rate and Rhythm: Regular rhythm. Heart sounds: Normal heart sounds. Pulmonary:      Breath sounds: Normal breath sounds. Abdominal:      Palpations: Abdomen is soft. Hernia: No hernia is present. Genitourinary:     Vagina: Normal. No vaginal discharge, erythema, tenderness or bleeding. Cervix: No cervical motion tenderness, discharge or friability. Uterus: Not enlarged and not tender. Adnexa:         Right: No mass or tenderness. Left: No mass or tenderness. Comments: Trial of EMB  Attempted today at the office secondary to her persistent brown vaginal discharge fluid in the endometrial cavity an episode of postmenopausal bleeding unable to proceed with procedure secondary to stenotic cervix patient desired procedure to be done under anesthesia  Skin:     General: Skin is warm and dry. Neurological:      Mental Status: She is alert and oriented to person, place, and time.

## 2023-08-21 NOTE — ANESTHESIA POSTPROCEDURE EVALUATION
Post-Op Assessment Note    CV Status:  Stable  Pain Score: 0    Pain management: adequate     Mental Status:  Arousable   Hydration Status:  Euvolemic and stable   PONV Controlled:  Controlled   Airway Patency:  Patent      Post Op Vitals Reviewed: Yes      Staff: CRNA         No notable events documented.     BP   157/72   Temp 97.2   Pulse 67   Resp 16   SpO2 98

## 2023-08-25 DIAGNOSIS — B37.9 CANDIDIASIS: ICD-10-CM

## 2023-08-25 PROCEDURE — 88341 IMHCHEM/IMCYTCHM EA ADD ANTB: CPT | Performed by: PATHOLOGY

## 2023-08-25 PROCEDURE — 88342 IMHCHEM/IMCYTCHM 1ST ANTB: CPT | Performed by: PATHOLOGY

## 2023-08-25 PROCEDURE — 88305 TISSUE EXAM BY PATHOLOGIST: CPT | Performed by: PATHOLOGY

## 2023-08-30 ENCOUNTER — TELEPHONE (OUTPATIENT)
Dept: PULMONOLOGY | Facility: MEDICAL CENTER | Age: 83
End: 2023-08-30

## 2023-08-30 NOTE — TELEPHONE ENCOUNTER
LM with patients  to have patient call office back to schedule 8m f/u, schedule next available. Appointment reminder mailed.

## 2023-09-05 ENCOUNTER — OFFICE VISIT (OUTPATIENT)
Dept: OBGYN CLINIC | Facility: CLINIC | Age: 83
End: 2023-09-05

## 2023-09-05 VITALS
SYSTOLIC BLOOD PRESSURE: 122 MMHG | HEIGHT: 62 IN | DIASTOLIC BLOOD PRESSURE: 60 MMHG | WEIGHT: 199 LBS | BODY MASS INDEX: 36.62 KG/M2

## 2023-09-05 DIAGNOSIS — N39.3 SUI (STRESS URINARY INCONTINENCE, FEMALE): ICD-10-CM

## 2023-09-05 DIAGNOSIS — Z98.890 STATUS POST D&C: ICD-10-CM

## 2023-09-05 DIAGNOSIS — N81.11 CYSTOCELE, MIDLINE: Primary | ICD-10-CM

## 2023-09-05 NOTE — PROGRESS NOTES
Assessment/Plan:     Diagnoses and all orders for this visit:    Cystocele, midline    ANTONIO (stress urinary incontinence, female)    Status post D&C      81-year-old female  Postmenopausal bleeding/vaginal discharge  Status post hysteroscopy D&C  History of diverticulitis  Post menopause  Chronic hypertension  History of tubal ligation  Cystocele/stress urinary incontinence  Plan  Pessary ring with support #3 inserted  Frequent voiding and Kegel exercise discussed with patient  Pathology results reviewed and discussed with patient recommend repeat ultrasound in 6 months  Return to office in 3 to 4 weeks follow-up    Subjective:      Patient ID: Ila Harry is a 81 y.o. female. HPI  Patient seen evaluated present to the office today status post hysteroscopy D&C pathology  Results reviewed and discussed with patient    Patient also desires to discuss management options for her cystocele/stress urinary incontinence  Option given for expectant management with Kegel exercises and frequent urination, trial of pessary, or surgical management  Patient desired combination of pessary with lifestyle modification      The following portions of the patient's history were reviewed and updated as appropriate: allergies, current medications, past family history, past medical history, past social history, past surgical history and problem list.    Review of Systems      Objective:      /60 (BP Location: Left arm, Patient Position: Sitting, Cuff Size: Adult)   Ht 5' 2" (1.575 m)   Wt 90.3 kg (199 lb)   BMI 36.40 kg/m²     A. Endometrium (curettage):  - Fibrovascular tissue with hemorrhage, fibrin, degenerative changes   - Scant viable epithelial cells      Comment:  - p53 shows wild-type staining in scant epithelial cells. P16 is negative. P40 and p63 highlight few squamous cells. Endometrial epithelium is inconspicuous.     - Given the number of vessels this may represent a polyp however given the amount of degenerative changes a definitive diagnosis cannot be made. Suggest clinical and radiologic correlation, appropriate follow-up. Physical Exam      Pessary    Date/Time: 9/5/2023 2:45 PM    Performed by: Darvin Syed MD  Authorized by: Darvin Syed MD  Universal Protocol:  Consent: Verbal consent obtained. Risks and benefits: risks, benefits and alternatives were discussed  Consent given by: patient  Time out: Immediately prior to procedure a "time out" was called to verify the correct patient, procedure, equipment, support staff and site/side marked as required. Patient understanding: patient states understanding of the procedure being performed  Patient consent: the patient's understanding of the procedure matches consent given  Procedure consent: procedure consent matches procedure scheduled  Relevant documents: relevant documents present and verified  Patient identity confirmed: verbally with patient      Indication:     Indication for pessary: cystocele    Pre-procedure:     Pessary procedure type:  Fitting  Procedure:     Pessary type:  Ring w/ support    Patient tolerance of procedure:   Tolerated well, no immediate complications

## 2023-09-18 ENCOUNTER — OFFICE VISIT (OUTPATIENT)
Dept: FAMILY MEDICINE CLINIC | Facility: CLINIC | Age: 83
End: 2023-09-18
Payer: MEDICARE

## 2023-09-18 VITALS
WEIGHT: 196.8 LBS | RESPIRATION RATE: 17 BRPM | HEIGHT: 62 IN | DIASTOLIC BLOOD PRESSURE: 74 MMHG | SYSTOLIC BLOOD PRESSURE: 138 MMHG | BODY MASS INDEX: 36.22 KG/M2 | HEART RATE: 66 BPM | TEMPERATURE: 96.1 F

## 2023-09-18 DIAGNOSIS — Z00.00 MEDICARE ANNUAL WELLNESS VISIT, SUBSEQUENT: Primary | ICD-10-CM

## 2023-09-18 PROBLEM — R06.02 SOB (SHORTNESS OF BREATH): Status: RESOLVED | Noted: 2018-06-06 | Resolved: 2023-09-18

## 2023-09-18 PROBLEM — G89.29 CHRONIC NECK PAIN: Status: RESOLVED | Noted: 2022-05-09 | Resolved: 2023-09-18

## 2023-09-18 PROBLEM — R07.89 LEFT-SIDED CHEST WALL PAIN: Status: RESOLVED | Noted: 2020-08-15 | Resolved: 2023-09-18

## 2023-09-18 PROBLEM — Z86.16 HISTORY OF COVID-19: Status: RESOLVED | Noted: 2023-01-01 | Resolved: 2023-09-18

## 2023-09-18 PROBLEM — U07.1 COVID-19: Status: RESOLVED | Noted: 2022-10-01 | Resolved: 2023-09-18

## 2023-09-18 PROBLEM — E66.9 OBESITY (BMI 30-39.9): Status: RESOLVED | Noted: 2022-07-11 | Resolved: 2023-09-18

## 2023-09-18 PROBLEM — M54.2 CHRONIC NECK PAIN: Status: RESOLVED | Noted: 2022-05-09 | Resolved: 2023-09-18

## 2023-09-18 PROBLEM — E07.89 THYROID FULLNESS: Status: RESOLVED | Noted: 2022-01-10 | Resolved: 2023-09-18

## 2023-09-18 PROBLEM — R26.89 IMBALANCE: Status: RESOLVED | Noted: 2022-01-10 | Resolved: 2023-09-18

## 2023-09-18 PROBLEM — R49.0 HOARSENESS OF VOICE: Status: RESOLVED | Noted: 2022-01-10 | Resolved: 2023-09-18

## 2023-09-18 PROCEDURE — G0439 PPPS, SUBSEQ VISIT: HCPCS | Performed by: INTERNAL MEDICINE

## 2023-09-18 NOTE — PATIENT INSTRUCTIONS
Medicare Preventive Visit Patient Instructions  Thank you for completing your Welcome to Medicare Visit or Medicare Annual Wellness Visit today. Your next wellness visit will be due in one year (9/18/2024). The screening/preventive services that you may require over the next 5-10 years are detailed below. Some tests may not apply to you based off risk factors and/or age. Screening tests ordered at today's visit but not completed yet may show as past due. Also, please note that scanned in results may not display below. Preventive Screenings:  Service Recommendations Previous Testing/Comments   Colorectal Cancer Screening  * Colonoscopy    * Fecal Occult Blood Test (FOBT)/Fecal Immunochemical Test (FIT)  * Fecal DNA/Cologuard Test  * Flexible Sigmoidoscopy Age: 43-73 years old   Colonoscopy: every 10 years (may be performed more frequently if at higher risk)  OR  FOBT/FIT: every 1 year  OR  Cologuard: every 3 years  OR  Sigmoidoscopy: every 5 years  Screening may be recommended earlier than age 39 if at higher risk for colorectal cancer. Also, an individualized decision between you and your healthcare provider will decide whether screening between the ages of 77-80 would be appropriate. Colonoscopy: 06/30/2023  FOBT/FIT: Not on file  Cologuard: Not on file  Sigmoidoscopy: Not on file    Screening Current     Breast Cancer Screening Age: 36 years old  Frequency: every 1-2 years  Not required if history of left and right mastectomy Mammogram: 10/18/2022    Screening Current   Cervical Cancer Screening Between the ages of 21-29, pap smear recommended once every 3 years. Between the ages of 32-69, can perform pap smear with HPV co-testing every 5 years.    Recommendations may differ for women with a history of total hysterectomy, cervical cancer, or abnormal pap smears in past. Pap Smear: Not on file    Screening Not Indicated   Hepatitis C Screening Once for adults born between 1945 and 1965  More frequently in patients at high risk for Hepatitis C Hep C Antibody: Not on file        Diabetes Screening 1-2 times per year if you're at risk for diabetes or have pre-diabetes Fasting glucose: 98 mg/dL (10/10/2022)  A1C: No results in last 5 years (No results in last 5 years)  Screening Current   Cholesterol Screening Once every 5 years if you don't have a lipid disorder. May order more often based on risk factors. Lipid panel: 10/10/2022    Screening Current     Other Preventive Screenings Covered by Medicare:  1. Abdominal Aortic Aneurysm (AAA) Screening: covered once if your at risk. You're considered to be at risk if you have a family history of AAA. 2. Lung Cancer Screening: covers low dose CT scan once per year if you meet all of the following conditions: (1) Age 48-67; (2) No signs or symptoms of lung cancer; (3) Current smoker or have quit smoking within the last 15 years; (4) You have a tobacco smoking history of at least 20 pack years (packs per day multiplied by number of years you smoked); (5) You get a written order from a healthcare provider. 3. Glaucoma Screening: covered annually if you're considered high risk: (1) You have diabetes OR (2) Family history of glaucoma OR (3)  aged 48 and older OR (3)  American aged 72 and older  3. Osteoporosis Screening: covered every 2 years if you meet one of the following conditions: (1) You're estrogen deficient and at risk for osteoporosis based off medical history and other findings; (2) Have a vertebral abnormality; (3) On glucocorticoid therapy for more than 3 months; (4) Have primary hyperparathyroidism; (5) On osteoporosis medications and need to assess response to drug therapy. · Last bone density test (DXA Scan): 10/18/2022.  5. HIV Screening: covered annually if you're between the age of 15-65. Also covered annually if you are younger than 13 and older than 72 with risk factors for HIV infection.  For pregnant patients, it is covered up to 3 times per pregnancy. Immunizations:  Immunization Recommendations   Influenza Vaccine Annual influenza vaccination during flu season is recommended for all persons aged >= 6 months who do not have contraindications   Pneumococcal Vaccine   * Pneumococcal conjugate vaccine = PCV13 (Prevnar 13), PCV15 (Vaxneuvance), PCV20 (Prevnar 20)  * Pneumococcal polysaccharide vaccine = PPSV23 (Pneumovax) Adults 20-63 years old: 1-3 doses may be recommended based on certain risk factors  Adults 72 years old: 1-2 doses may be recommended based off what pneumonia vaccine you previously received   Hepatitis B Vaccine 3 dose series if at intermediate or high risk (ex: diabetes, end stage renal disease, liver disease)   Tetanus (Td) Vaccine - COST NOT COVERED BY MEDICARE PART B Following completion of primary series, a booster dose should be given every 10 years to maintain immunity against tetanus. Td may also be given as tetanus wound prophylaxis. Tdap Vaccine - COST NOT COVERED BY MEDICARE PART B Recommended at least once for all adults. For pregnant patients, recommended with each pregnancy. Shingles Vaccine (Shingrix) - COST NOT COVERED BY MEDICARE PART B  2 shot series recommended in those aged 48 and above     Health Maintenance Due:      Topic Date Due   • Colorectal Cancer Screening  06/27/2033     Immunizations Due:      Topic Date Due   • COVID-19 Vaccine (5 - Pfizer series) 06/11/2022   • Influenza Vaccine (1) 09/01/2023     Advance Directives   What are advance directives? Advance directives are legal documents that state your wishes and plans for medical care. These plans are made ahead of time in case you lose your ability to make decisions for yourself. Advance directives can apply to any medical decision, such as the treatments you want, and if you want to donate organs. What are the types of advance directives? There are many types of advance directives, and each state has rules about how to use them.  You may choose a combination of any of the following:  · Living will: This is a written record of the treatment you want. You can also choose which treatments you do not want, which to limit, and which to stop at a certain time. This includes surgery, medicine, IV fluid, and tube feedings. · Durable power of  for healthcare [facility]): This is a written record that states who you want to make healthcare choices for you when you are unable to make them for yourself. This person, called a proxy, is usually a family member or a friend. You may choose more than 1 proxy. · Do not resuscitate (DNR) order:  A DNR order is used in case your heart stops beating or you stop breathing. It is a request not to have certain forms of treatment, such as CPR. A DNR order may be included in other types of advance directives. · Medical directive: This covers the care that you want if you are in a coma, near death, or unable to make decisions for yourself. You can list the treatments you want for each condition. Treatment may include pain medicine, surgery, blood transfusions, dialysis, IV or tube feedings, and a ventilator (breathing machine). · Values history: This document has questions about your views, beliefs, and how you feel and think about life. This information can help others choose the care that you would choose. Why are advance directives important? An advance directive helps you control your care. Although spoken wishes may be used, it is better to have your wishes written down. Spoken wishes can be misunderstood, or not followed. Treatments may be given even if you do not want them. An advance directive may make it easier for your family to make difficult choices about your care. Urinary Incontinence   Urinary incontinence (UI)  is when you lose control of your bladder. UI develops because your bladder cannot store or empty urine properly.  The 3 most common types of UI are stress incontinence, urge incontinence, or both. Medicines:   · May be given to help strengthen your bladder control. Report any side effects of medication to your healthcare provider. Do pelvic muscle exercises often:  Your pelvic muscles help you stop urinating. Squeeze these muscles tight for 5 seconds, then relax for 5 seconds. Gradually work up to squeezing for 10 seconds. Do 3 sets of 15 repetitions a day, or as directed. This will help strengthen your pelvic muscles and improve bladder control. Train your bladder:  Go to the bathroom at set times, such as every 2 hours, even if you do not feel the urge to go. You can also try to hold your urine when you feel the urge to go. For example, hold your urine for 5 minutes when you feel the urge to go. As that becomes easier, hold your urine for 10 minutes. Self-care:   · Keep a UI record. Write down how often you leak urine and how much you leak. Make a note of what you were doing when you leaked urine. · Drink liquids as directed. You may need to limit the amount of liquid you drink to help control your urine leakage. Do not drink any liquid right before you go to bed. Limit or do not have drinks that contain caffeine or alcohol. · Prevent constipation. Eat a variety of high-fiber foods. Good examples are high-fiber cereals, beans, vegetables, and whole-grain breads. Walking is the best way to trigger your intestines to have a bowel movement. · Exercise regularly and maintain a healthy weight. Weight loss and exercise will decrease pressure on your bladder and help you control your leakage. · Use a catheter as directed  to help empty your bladder. A catheter is a tiny, plastic tube that is put into your bladder to drain your urine. · Go to behavior therapy as directed. Behavior therapy may be used to help you learn to control your urge to urinate.     Weight Management   Why it is important to manage your weight:  Being overweight increases your risk of health conditions such as heart disease, high blood pressure, type 2 diabetes, and certain types of cancer. It can also increase your risk for osteoarthritis, sleep apnea, and other respiratory problems. Aim for a slow, steady weight loss. Even a small amount of weight loss can lower your risk of health problems. How to lose weight safely:  A safe and healthy way to lose weight is to eat fewer calories and get regular exercise. You can lose up about 1 pound a week by decreasing the number of calories you eat by 500 calories each day. Healthy meal plan for weight management:  A healthy meal plan includes a variety of foods, contains fewer calories, and helps you stay healthy. A healthy meal plan includes the following:  · Eat whole-grain foods more often. A healthy meal plan should contain fiber. Fiber is the part of grains, fruits, and vegetables that is not broken down by your body. Whole-grain foods are healthy and provide extra fiber in your diet. Some examples of whole-grain foods are whole-wheat breads and pastas, oatmeal, brown rice, and bulgur. · Eat a variety of vegetables every day. Include dark, leafy greens such as spinach, kale, kofi greens, and mustard greens. Eat yellow and orange vegetables such as carrots, sweet potatoes, and winter squash. · Eat a variety of fruits every day. Choose fresh or canned fruit (canned in its own juice or light syrup) instead of juice. Fruit juice has very little or no fiber. · Eat low-fat dairy foods. Drink fat-free (skim) milk or 1% milk. Eat fat-free yogurt and low-fat cottage cheese. Try low-fat cheeses such as mozzarella and other reduced-fat cheeses. · Choose meat and other protein foods that are low in fat. Choose beans or other legumes such as split peas or lentils. Choose fish, skinless poultry (chicken or turkey), or lean cuts of red meat (beef or pork). Before you cook meat or poultry, cut off any visible fat. · Use less fat and oil.   Try baking foods instead of frying them. Add less fat, such as margarine, sour cream, regular salad dressing and mayonnaise to foods. Eat fewer high-fat foods. Some examples of high-fat foods include french fries, doughnuts, ice cream, and cakes. · Eat fewer sweets. Limit foods and drinks that are high in sugar. This includes candy, cookies, regular soda, and sweetened drinks. Exercise:  Exercise at least 30 minutes per day on most days of the week. Some examples of exercise include walking, biking, dancing, and swimming. You can also fit in more physical activity by taking the stairs instead of the elevator or parking farther away from stores. Ask your healthcare provider about the best exercise plan for you. © Copyright [employer] 2018 Information is for End User's use only and may not be sold, redistributed or otherwise used for commercial purposes.  All illustrations and images included in CareNotes® are the copyrighted property of [employer] Inc[employer] or [address] [place] [address]

## 2023-09-18 NOTE — PROGRESS NOTES
Assessment and Plan:     Problem List Items Addressed This Visit    None  Visit Diagnoses     Medicare annual wellness visit, subsequent    -  Primary           Preventive health issues were discussed with patient, and age appropriate screening tests were ordered as noted in patient's After Visit Summary. Personalized health advice and appropriate referrals for health education or preventive services given if needed, as noted in patient's After Visit Summary. History of Present Illness:     Patient presents for a Medicare Wellness Visit    Here for AWV. Had been having brownish discharge, had issues trying to get in with Dr. Tito Sosa so went to another gyn and had a D and C. Pathology was benign and has f/u scheduled. Patient Care Team:  Shital Hoover MD as PCP - General (Internal Medicine)  Sandy Taylor MD     Review of Systems:     Review of Systems   Constitutional: Negative. HENT: Negative. Eyes: Negative. Respiratory: Negative. Cardiovascular: Negative. Gastrointestinal: Negative. Endocrine: Negative. Genitourinary: Negative. Musculoskeletal: Negative. Skin: Negative. Allergic/Immunologic: Negative. Neurological: Negative. Hematological: Negative. Psychiatric/Behavioral: Negative.          Problem List:     Patient Active Problem List   Diagnosis   • Abnormal stress ECG with treadmill   • Moeller esophagus   • Bilateral carotid artery stenosis   • Colon, diverticulosis   • Costochondritis   • Generalized osteoarthritis of multiple sites   • Obstructive sleep apnea   • Seasonal allergic rhinitis due to pollen   • Nocturnal hypoxemia   • Mild intermittent asthma without complication   • ETD (Eustachian tube dysfunction), bilateral   • Sensorineural hearing loss (SNHL), bilateral   • Vitamin D deficiency      Past Medical and Surgical History:     Past Medical History:   Diagnosis Date   • Anxiety    • Benign essential hypertension 05/08/2014   • Bleeding from varicose vein    • Environmental allergies    • Obesity    • Scoliosis     mild      Past Surgical History:   Procedure Laterality Date   • COLONOSCOPY     • EYE SURGERY      cataract   • VT HYSTEROSCOPY BX ENDOMETRIUM&/POLYPC W/WO D&C N/A 8/21/2023    Procedure: DILATATION AND CURETTAGE (D&C) WITH HYSTEROSCOPY;  Surgeon: Eduarda Jenkins MD;  Location:  MAIN OR;  Service: Gynecology   • TUBAL LIGATION        Family History:     Family History   Problem Relation Age of Onset   • Stroke Mother    • Colon cancer Maternal Aunt       Social History:     Social History     Socioeconomic History   • Marital status: /Civil Union     Spouse name: None   • Number of children: None   • Years of education: None   • Highest education level: None   Occupational History   • None   Tobacco Use   • Smoking status: Never   • Smokeless tobacco: Never   Vaping Use   • Vaping Use: Never used   Substance and Sexual Activity   • Alcohol use: Yes     Comment: social    • Drug use: No   • Sexual activity: Not Currently   Other Topics Concern   • None   Social History Narrative   • None     Social Determinants of Health     Financial Resource Strain: Low Risk  (9/18/2023)    Overall Financial Resource Strain (CARDIA)    • Difficulty of Paying Living Expenses: Not hard at all   Food Insecurity: Not on file   Transportation Needs: No Transportation Needs (9/18/2023)    PRAPARE - Transportation    • Lack of Transportation (Medical): No    • Lack of Transportation (Non-Medical):  No   Physical Activity: Not on file   Stress: Not on file   Social Connections: Not on file   Intimate Partner Violence: Not on file   Housing Stability: Not on file      Medications and Allergies:     Current Outpatient Medications   Medication Sig Dispense Refill   • acetaminophen (TYLENOL) 500 mg tablet Take 500 mg by mouth every 6 (six) hours as needed for mild pain     • albuterol (Ventolin HFA) 90 mcg/act inhaler Inhale 2 puffs every 6 (six) hours as needed for wheezing 18 g 6   • B COMPLEX VITAMINS PO Take by mouth     • levalbuterol (Xopenex HFA) 45 mcg/act inhaler Inhale 1-2 puffs every 4 (four) hours as needed for wheezing 15 g 6   • Naproxen Sodium (ALEVE PO) Take by mouth     • nystatin-triamcinolone (MYCOLOG-II) cream apply to affected area twice a day 60 g 1   • zinc gluconate 50 mg tablet Take 50 mg by mouth if needed       No current facility-administered medications for this visit. Allergies   Allergen Reactions   • Penicillins Other (See Comments)     "Just doesn't work."   • Sulfa Antibiotics Itching      Immunizations:     Immunization History   Administered Date(s) Administered   • COVID-19 PFIZER VACCINE 0.3 ML IM 01/18/2021, 02/06/2021, 10/09/2021   • COVID-19 Pfizer vac (Chirag-sucrose, gray cap) 12 yr+ IM 04/16/2022   • Pneumococcal Conjugate 13-Valent 02/25/2019   • Pneumococcal Polysaccharide PPV23 10/01/2006      Health Maintenance:         Topic Date Due   • Colorectal Cancer Screening  06/27/2033         Topic Date Due   • COVID-19 Vaccine (5 - Pfizer series) 06/11/2022   • Influenza Vaccine (1) 09/01/2023      Medicare Screening Tests and Risk Assessments:     Alisa Khan is here for her Subsequent Wellness visit. Health Risk Assessment:   Patient rates overall health as good. Patient feels that their physical health rating is slightly worse. Patient is very satisfied with their life. Eyesight was rated as same. Hearing was rated as same. Patient feels that their emotional and mental health rating is much better. Patients states they are sometimes angry. Patient states they are often unusually tired/fatigued. Pain experienced in the last 7 days has been some. Patient's pain rating has been 3/10. Patient states that she has experienced no weight loss or gain in last 6 months. Depression Screening:   PHQ-2 Score: 0      Fall Risk Screening:    In the past year, patient has experienced: no history of falling in past year      Urinary Incontinence Screening:   Patient has leaked urine accidently in the last six months. Seeing gynecology for this. Home Safety:  Patient does not have trouble with stairs inside or outside of their home. Patient has working smoke alarms and has working carbon monoxide detector. Home safety hazards include: none. Nutrition:   Current diet is Regular. Medications:   Patient is currently taking over-the-counter supplements. OTC medications include: see medication list. Patient is able to manage medications. Activities of Daily Living (ADLs)/Instrumental Activities of Daily Living (IADLs):   Walk and transfer into and out of bed and chair?: Yes  Dress and groom yourself?: Yes    Bathe or shower yourself?: Yes    Feed yourself? Yes  Do your laundry/housekeeping?: Yes  Manage your money, pay your bills and track your expenses?: Yes  Make your own meals?: Yes    Do your own shopping?: Yes    Previous Hospitalizations:   Any hospitalizations or ED visits within the last 12 months?: Yes    How many hospitalizations have you had in the last year?: 3-4    Advance Care Planning:   Living will: Yes    Durable POA for healthcare:  Yes    Advanced directive: Yes    End of Life Decisions reviewed with patient: Yes      Cognitive Screening:   Provider or family/friend/caregiver concerned regarding cognition?: No    PREVENTIVE SCREENINGS      Cardiovascular Screening:    General: Screening Current      Diabetes Screening:     General: Screening Current      Colorectal Cancer Screening:     General: Screening Current      Breast Cancer Screening:     General: Screening Current      Cervical Cancer Screening:    General: Screening Not Indicated      Osteoporosis Screening:    General: Screening Current      Abdominal Aortic Aneurysm (AAA) Screening:        General: Screening Not Indicated      Lung Cancer Screening:     General: Screening Not Indicated      Hepatitis C Screening:    General: Screening Not Indicated    Screening, Brief Intervention, and Referral to Treatment (SBIRT)    Screening  Typical number of drinks in a day: 0  Typical number of drinks in a week: 0  Interpretation: Low risk drinking behavior. Single Item Drug Screening:  How often have you used an illegal drug (including marijuana) or a prescription medication for non-medical reasons in the past year? never    Single Item Drug Screen Score: 0  Interpretation: Negative screen for possible drug use disorder    Brief Intervention  Alcohol & drug use screenings were reviewed. No concerns regarding substance use disorder identified. Other Counseling Topics:   Car/seat belt/driving safety, skin self-exam, sunscreen and calcium and vitamin D intake and regular weightbearing exercise. No results found. Physical Exam:     /74   Pulse 66   Temp (!) 96.1 °F (35.6 °C)   Resp 17   Ht 5' 2" (1.575 m)   Wt 89.3 kg (196 lb 12.8 oz)   BMI 36.00 kg/m²     Physical Exam  Vitals and nursing note reviewed. Constitutional:       General: She is not in acute distress. Appearance: She is well-developed. HENT:      Head: Normocephalic and atraumatic. Eyes:      Conjunctiva/sclera: Conjunctivae normal.   Cardiovascular:      Rate and Rhythm: Normal rate and regular rhythm. Heart sounds: No murmur heard. Pulmonary:      Effort: Pulmonary effort is normal. No respiratory distress. Breath sounds: Normal breath sounds. Abdominal:      Palpations: Abdomen is soft. Tenderness: There is no abdominal tenderness. Musculoskeletal:         General: No swelling. Cervical back: Neck supple. Skin:     General: Skin is warm and dry. Capillary Refill: Capillary refill takes less than 2 seconds. Neurological:      Mental Status: She is alert.    Psychiatric:         Mood and Affect: Mood normal.          Papa Briceño MD

## 2023-09-25 ENCOUNTER — OFFICE VISIT (OUTPATIENT)
Dept: OBGYN CLINIC | Facility: CLINIC | Age: 83
End: 2023-09-25
Payer: MEDICARE

## 2023-09-25 VITALS
SYSTOLIC BLOOD PRESSURE: 126 MMHG | DIASTOLIC BLOOD PRESSURE: 64 MMHG | HEIGHT: 62 IN | WEIGHT: 198.8 LBS | BODY MASS INDEX: 36.58 KG/M2

## 2023-09-25 DIAGNOSIS — Z46.89 PESSARY MAINTENANCE: Primary | ICD-10-CM

## 2023-09-25 PROCEDURE — 99212 OFFICE O/P EST SF 10 MIN: CPT | Performed by: OBSTETRICS & GYNECOLOGY

## 2023-09-25 NOTE — PROGRESS NOTES
Assessment/Plan:     Diagnoses and all orders for this visit:    Pessary maintenance        60-year-old female  Postmenopausal bleeding/vaginal discharge  Status post hysteroscopy D&C  History of diverticulitis  Post menopause  Chronic hypertension  History of tubal ligation  Cystocele/stress urinary incontinence  Plan  Pessary ring with support #3 inserted  Frequent voiding and Kegel exercise discussed with patient  recommend repeat ultrasound in 6 months  Return to office in 6 months follow-up       Subjective:      Patient ID: Herman Stokes is a 81 y.o. female. HPI  Patient seen evaluated present to the office today follow-up on pessary patient has moderate cystocele patient has ring pessary with support patient said pessary was coming off and was having difficulty reinserting the pessary patient complaining of mild vaginal pressure that worse at the end of the day when she is doing too much work  Different management option for her cystocele reviewed and discussed with patient patient is considering trying the same pessary 1 more time  The following portions of the patient's history were reviewed and updated as appropriate: allergies, current medications, past family history, past medical history, past social history, past surgical history and problem list.    Review of Systems      Objective:      /64 (BP Location: Left arm, Patient Position: Sitting, Cuff Size: Adult)   Ht 5' 2" (1.575 m)   Wt 90.2 kg (198 lb 12.8 oz)   BMI 36.36 kg/m²          Physical Exam  Constitutional:       Appearance: She is well-developed. Abdominal:      General: There is no distension. Palpations: Abdomen is soft. Tenderness: There is no abdominal tenderness. Genitourinary:     Labia:         Right: No rash, tenderness or lesion. Left: No rash, tenderness or lesion. Vagina: No signs of injury. No vaginal discharge, erythema or tenderness.       Cervix: No cervical motion tenderness, discharge or friability. Adnexa:         Right: No mass, tenderness or fullness. Left: No mass, tenderness or fullness. Comments: Moderate cystocele her ring pessary #3 with support inserted patient taught how to remove and insert the pessary  To return to office in 6 months for follow-up or earlier if pessary keep coming off and patient desired different pessary   Neurological:      Mental Status: She is alert and oriented to person, place, and time.    Psychiatric:         Behavior: Behavior normal.

## 2024-03-10 ENCOUNTER — OFFICE VISIT (OUTPATIENT)
Dept: URGENT CARE | Facility: CLINIC | Age: 84
End: 2024-03-10
Payer: MEDICARE

## 2024-03-10 VITALS
BODY MASS INDEX: 34.2 KG/M2 | TEMPERATURE: 97.3 F | WEIGHT: 193 LBS | HEART RATE: 78 BPM | HEIGHT: 63 IN | DIASTOLIC BLOOD PRESSURE: 63 MMHG | OXYGEN SATURATION: 96 % | SYSTOLIC BLOOD PRESSURE: 134 MMHG | RESPIRATION RATE: 18 BRPM

## 2024-03-10 DIAGNOSIS — J06.9 ACUTE UPPER RESPIRATORY INFECTION: Primary | ICD-10-CM

## 2024-03-10 PROCEDURE — G2211 COMPLEX E/M VISIT ADD ON: HCPCS | Performed by: PHYSICIAN ASSISTANT

## 2024-03-10 PROCEDURE — 99213 OFFICE O/P EST LOW 20 MIN: CPT | Performed by: PHYSICIAN ASSISTANT

## 2024-03-10 RX ORDER — FLUTICASONE PROPIONATE 50 MCG
1 SPRAY, SUSPENSION (ML) NASAL DAILY
Qty: 11.1 ML | Refills: 0 | Status: SHIPPED | OUTPATIENT
Start: 2024-03-10

## 2024-03-10 RX ORDER — BENZONATATE 100 MG/1
100 CAPSULE ORAL 3 TIMES DAILY PRN
Qty: 20 CAPSULE | Refills: 0 | Status: SHIPPED | OUTPATIENT
Start: 2024-03-10

## 2024-03-10 NOTE — PATIENT INSTRUCTIONS
Acute Upper Respiratory Tract Infection:   -Based on the patients hx and presentation they are likely suffering from a Viral illness. No sign of bacterial infection at this time.   -Stay very well hydrated and push fluids  -Run a humidifier by your bed and take steam showers to clear mucus   -Zicam nasal AllClear can be soothing to the nasal passages   -You can use flonase once daily for two weeks   -Advil or Tylenol for fever or pain.  -Mucinex OTC or Zyrtec or Claritin. Drink plenty of water with the mucinex.   -Honey or throat lozenges for cough may be helpful  -Warm salt water gargles and tea with honey   -Sleep with a few pillows propping you up at night to aid with coughing   -Obtain a pulse ox device and check your O2 1-2 times a day. You want your oxygen levels to be >93%. If they go below 93% go to the ED immediately.   -Vitamin C 500mg, Vitamin D 2000IU daily. You can attempt to use zinc or Zicam up to 30mg per day.   -If your sx worsen or persist follow up with your PCP for recheck

## 2024-03-10 NOTE — PROGRESS NOTES
[facility]        NAME: Khadra Anderson is a 83 y.o. female  : 1940    MRN: [id number]  DATE: March 10, 2024  TIME: 11:38 AM    Assessment and Plan   Acute upper respiratory infection [J06.9]  1. Acute upper respiratory infection              Patient Instructions   Acute Upper Respiratory Tract Infection:   -Based on the patients hx and presentation they are likely suffering from a Viral illness. No sign of bacterial infection at this time.   -Stay very well hydrated and push fluids  -Run a humidifier by your bed and take steam showers to clear mucus   -Zicam nasal AllClear can be soothing to the nasal passages   -You can use flonase once daily for two weeks   -Advil or Tylenol for fever or pain.  -Mucinex OTC or Zyrtec or Claritin. Drink plenty of water with the mucinex.   -Honey or throat lozenges for cough may be helpful  -Warm salt water gargles and tea with honey   -Sleep with a few pillows propping you up at night to aid with coughing   -Obtain a pulse ox device and check your O2 1-2 times a day. You want your oxygen levels to be >93%. If they go below 93% go to the ED immediately.   -Vitamin C 500mg, Vitamin D 2000IU daily. You can attempt to use zinc or Zicam up to 30mg per day.   -If your sx worsen or persist follow up with your PCP for recheck        Follow up with PCP in 3-5 days.  Proceed to  ER if symptoms worsen.    If tests have been performed at [facility], our office will contact you with results if changes need to be made to the care plan discussed with you at the visit.  [facility]    Chief Complaint     Chief Complaint   Patient presents with    viral illness     Phlegm, cough, sinus pressure, yellow nasal discharge         History of Present Illness       The patient is an 83-year-old female with a hx of GEORGINA, asthma who presents today for a 2-3 day hx of coughing, sinus pressure, nasal congestion, yellow nasal drainage. She states that  her main concern is the copious mucus from the nose and post nasal drainage. No fever, chills, body aches. No dyspnea, wheezing, chest tightness, cp, palpitations. No dizziness or weakness. No GI sx. Good PO intake. No loss of taste or smell. No lower extremity edema. She has not needed to use her albuterol inhaler.  No known sick contacts or recent travel. No OTC measures.           Review of Systems   Review of Systems   Constitutional:  Positive for fatigue. Negative for activity change, appetite change, chills, diaphoresis and fever.   HENT:  Positive for congestion, postnasal drip, rhinorrhea and sinus pressure. Negative for ear discharge, ear pain, facial swelling, hearing loss, sinus pain, sore throat, tinnitus, trouble swallowing and voice change.    Eyes:  Negative for visual disturbance.   Respiratory:  Positive for cough. Negative for apnea, chest tightness, shortness of breath, wheezing and stridor.    Cardiovascular:  Negative for chest pain, palpitations and leg swelling.   Gastrointestinal:  Negative for abdominal distention and abdominal pain.   Genitourinary:  Negative for decreased urine volume.   Musculoskeletal:  Negative for arthralgias, joint swelling, myalgias, neck pain and neck stiffness.   Skin:  Negative for rash.   Allergic/Immunologic: Negative for immunocompromised state.   Neurological:  Negative for dizziness, weakness, light-headedness, numbness and headaches.   Hematological:  Negative for adenopathy.         Current Medications       Current Outpatient Medications:     acetaminophen (TYLENOL) 500 mg tablet, Take 500 mg by mouth every 6 (six) hours as needed for mild pain, Disp: , Rfl:     B COMPLEX VITAMINS PO, Take by mouth, Disp: , Rfl:     zinc gluconate 50 mg tablet, Take 50 mg by mouth if needed, Disp: , Rfl:     albuterol (Ventolin HFA) 90 mcg/act inhaler, Inhale 2 puffs every 6 (six) hours as needed for wheezing (Patient not taking: Reported on 3/10/2024), Disp: 18 g, Rfl:  "6    levalbuterol (Xopenex HFA) 45 mcg/act inhaler, Inhale 1-2 puffs every 4 (four) hours as needed for wheezing (Patient not taking: Reported on 3/10/2024), Disp: 15 g, Rfl: 6    Naproxen Sodium (ALEVE PO), Take by mouth (Patient not taking: Reported on 3/10/2024), Disp: , Rfl:     nystatin-triamcinolone (MYCOLOG-II) cream, apply to affected area twice a day (Patient not taking: Reported on 3/10/2024), Disp: 60 g, Rfl: 1    Current Allergies     Allergies as of 03/10/2024 - Reviewed 03/10/2024   Allergen Reaction Noted    Penicillins Other (See Comments) 09/11/2014    Sulfa antibiotics Itching 10/28/2013            The following portions of the patient's history were reviewed and updated as appropriate: allergies, current medications, past family history, past medical history, past social history, past surgical history and problem list.     Past Medical History:   Diagnosis Date    Anxiety     Benign essential hypertension 05/08/2014    Bleeding from varicose vein     Environmental allergies     Obesity     Scoliosis     mild        Past Surgical History:   Procedure Laterality Date    COLONOSCOPY      EYE SURGERY      cataract    TN HYSTEROSCOPY BX ENDOMETRIUM&/POLYPC W/WO D&C N/A 8/21/2023    Procedure: DILATATION AND CURETTAGE (D&C) WITH HYSTEROSCOPY;  Surgeon: Kb Rueda MD;  Location: [facility];  Service: Gynecology    TUBAL LIGATION         Family History   Problem Relation Age of Onset    Stroke Mother     Colon cancer Maternal Aunt          Medications have been verified.        Objective   /63   Pulse 78   Temp (!) 97.3 °F (36.3 °C)   Resp 18   Ht 5' 3\" (1.6 m)   Wt 87.5 kg (193 lb)   SpO2 96%   BMI 34.19 kg/m²   No LMP recorded. Patient is postmenopausal.       Physical Exam     Physical Exam  Vitals and nursing note reviewed.   Constitutional:       General: She is not in acute distress.     Appearance: She is well-developed. She is not ill-appearing, toxic-appearing or diaphoretic. "   HENT:      Head: Normocephalic and atraumatic.      Right Ear: Hearing, tympanic membrane, ear canal and external ear normal.      Left Ear: Hearing, tympanic membrane, ear canal and external ear normal.      Nose: Congestion present. No mucosal edema or rhinorrhea.      Right Sinus: No maxillary sinus tenderness or frontal sinus tenderness.      Left Sinus: No maxillary sinus tenderness or frontal sinus tenderness.      Mouth/Throat:      Lips: Pink.      Mouth: Mucous membranes are moist.      Pharynx: Uvula midline. No pharyngeal swelling, oropharyngeal exudate, posterior oropharyngeal erythema or uvula swelling.      Tonsils: No tonsillar exudate or tonsillar abscesses. 1+ on the right. 1+ on the left.   Cardiovascular:      Rate and Rhythm: Normal rate and regular rhythm.      Heart sounds: S1 normal and S2 normal. Heart sounds not distant. No murmur heard.     No friction rub. No gallop. No S3 or S4 sounds.   Pulmonary:      Effort: Pulmonary effort is normal. No tachypnea, bradypnea, accessory muscle usage or respiratory distress.      Breath sounds: Normal breath sounds and air entry. No stridor, decreased air movement or transmitted upper airway sounds. No decreased breath sounds, wheezing, rhonchi or rales.   Musculoskeletal:      Cervical back: Normal range of motion and neck supple.   Lymphadenopathy:      Cervical: No cervical adenopathy.      Right cervical: No superficial cervical adenopathy.     Left cervical: No superficial cervical adenopathy.   Neurological:      Mental Status: She is alert and oriented to person, place, and time.   Psychiatric:         Behavior: Behavior normal.

## 2024-03-25 ENCOUNTER — OFFICE VISIT (OUTPATIENT)
Dept: OBGYN CLINIC | Facility: CLINIC | Age: 84
End: 2024-03-25
Payer: MEDICARE

## 2024-03-25 VITALS
HEIGHT: 63 IN | SYSTOLIC BLOOD PRESSURE: 128 MMHG | WEIGHT: 197.6 LBS | DIASTOLIC BLOOD PRESSURE: 68 MMHG | BODY MASS INDEX: 35.01 KG/M2

## 2024-03-25 DIAGNOSIS — N81.4 CYSTOCELE WITH PROLAPSE: Primary | ICD-10-CM

## 2024-03-25 PROCEDURE — 99213 OFFICE O/P EST LOW 20 MIN: CPT | Performed by: OBSTETRICS & GYNECOLOGY

## 2024-03-25 NOTE — PROGRESS NOTES
Assessment/Plan:     Diagnoses and all orders for this visit:    Cystocele with prolapse        83-year-old female  Postmenopausal bleeding/vaginal discharge  Status post hysteroscopy D&C benign pathology  History of diverticulitis  Post menopause  Chronic hypertension  History of tubal ligation  Cystocele/stress urinary incontinence pessary ring with support keep falling off  Plan  Option given for conservative management, continue pessary ring with support, trial of gel horn pessary, surgical management with risk-benefit side effect reviewed discussed with patient  Patient desired expectant management for now she will return to office in 3 months for follow-up annual exam and discuss management option for her cystocele       Subjective:      Patient ID: Khadra Anderson is a 83 y.o. female.    HPI  83-year-old female present to the office today to discuss management option for her cystocele  Patient was trying ring pessary with support for her cystocele but it keeps falling off  Patient is complaining of urinary incontinence with a cystocele as well  Patient is doing Kegel exercise and bladder diet symptom mildly improved but not resolved  Patient desires management option for her cystocele  Different management option explained and discussed with patient option for conservative management bladder diet Kegel exercise will refer to physical therapy, continue current ring pessary with support patient know how to reinsert the pessary in case if it came off, trial of gel horn pessary, surgical management cystocele repair anterior colporrhaphy with addressing stress urinary incontinence at the time of the surgery  Patient desire to avoid surgical management she would like to think about her management option and she will return to office in 3 months for annual exam and discuss management option  All patient questions answered in details and patient was satisfied  The following portions of the patient's history were  "reviewed and updated as appropriate: allergies, current medications, past family history, past medical history, past social history, past surgical history and problem list.    Review of Systems      Objective:      /68 (BP Location: Left arm, Patient Position: Sitting, Cuff Size: Adult)   Ht 5' 3\" (1.6 m)   Wt 89.6 kg (197 lb 9.6 oz)   BMI 35.00 kg/m²          Physical Exam        Constitutional:       Appearance: She is well-developed.   Abdominal:      General: There is no distension.      Palpations: Abdomen is soft.      Tenderness: There is no abdominal tenderness.   Genitourinary:     Labia:         Right: No rash, tenderness or lesion.         Left: No rash, tenderness or lesion.       Vagina: No signs of injury. No vaginal discharge, erythema or tenderness.      Cervix: No cervical motion tenderness, discharge or friability.      Adnexa:         Right: No mass, tenderness or fullness.          Left: No mass, tenderness or fullness.            Comments: Moderate cystocele with stage II uterovaginal prolapse noted no rectocele or other abnormality noted  Neurological:      Mental Status: She is alert and oriented to person, place, and time.   Psychiatric:         Behavior: Behavior normal.     "

## 2024-05-01 PROBLEM — J06.9 ACUTE UPPER RESPIRATORY INFECTION: Status: RESOLVED | Noted: 2024-03-10 | Resolved: 2024-05-01

## 2024-06-24 ENCOUNTER — ANNUAL EXAM (OUTPATIENT)
Dept: OBGYN CLINIC | Facility: CLINIC | Age: 84
End: 2024-06-24
Payer: MEDICARE

## 2024-06-24 VITALS
HEIGHT: 63 IN | DIASTOLIC BLOOD PRESSURE: 70 MMHG | SYSTOLIC BLOOD PRESSURE: 140 MMHG | WEIGHT: 191.8 LBS | BODY MASS INDEX: 33.98 KG/M2

## 2024-06-24 DIAGNOSIS — Z01.419 WOMEN'S ANNUAL ROUTINE GYNECOLOGICAL EXAMINATION: Primary | ICD-10-CM

## 2024-06-24 DIAGNOSIS — Z78.0 MENOPAUSE: ICD-10-CM

## 2024-06-24 DIAGNOSIS — Z12.31 SCREENING MAMMOGRAM, ENCOUNTER FOR: ICD-10-CM

## 2024-06-24 PROCEDURE — G0101 CA SCREEN;PELVIC/BREAST EXAM: HCPCS | Performed by: OBSTETRICS & GYNECOLOGY

## 2024-06-24 NOTE — PROGRESS NOTES
"Subjective      Khadra Anderson is a 84 y.o. female who presents for annual well woman exam.     No PMB   Denies any hot flashes or night sweat    Family history of uterine or ovarian cancer: no  Family history of breast cancer:no     Menstrual History:  OB History          3    Para   3    Term   3            AB        Living   3         SAB        IAB        Ectopic        Multiple        Live Births                   No LMP recorded. Patient is postmenopausal.       The following portions of the patient's history were reviewed and updated as appropriate: allergies, current medications, past family history, past medical history, past social history, past surgical history, and problem list.    Review of Systems  Review of Systems   Constitutional:  Positive for fatigue. Negative for activity change, appetite change, chills and fever.   Respiratory:  Negative for apnea, cough, chest tightness and shortness of breath.    Cardiovascular:  Negative for chest pain, palpitations and leg swelling.   Gastrointestinal:  Negative for abdominal pain, constipation, diarrhea, nausea and vomiting.   Genitourinary:  Negative for difficulty urinating, dysuria, flank pain, frequency, hematuria and urgency.   Neurological:  Negative for dizziness, seizures, syncope, light-headedness, numbness and headaches.   Psychiatric/Behavioral:  Negative for agitation and confusion.           Objective      /70 (BP Location: Left arm, Patient Position: Sitting, Cuff Size: Adult)   Ht 5' 3\" (1.6 m)   Wt 87 kg (191 lb 12.8 oz)   BMI 33.98 kg/m²     Physical Exam  OBGyn Exam     General:   alert and oriented, in no acute distress, alert, appears stated age, and cooperative   Heart: regular rate and rhythm, S1, S2 normal, no murmur, click, rub or gallop   Lungs: clear to auscultation bilaterally   Abdomen: soft, non-tender, without masses or organomegaly   Vulva: normal   Vagina: Moderate cystocele   Cervix: no cervical motion " tenderness and no lesions   Uterus: Normal size/second to third-degree uterovaginal prolapse   Adnexa:  Breast Exam:  normal adnexa  breasts appear normal, no suspicious masses, no skin or nipple changes or axillary nodes.                Assessment      @well woman@ .   84-year-old female  Annual exam   Status post hysteroscopy D&C benign pathology  History of diverticulitis  Post menopause  Chronic hypertension  History of tubal ligation  Cystocele/stress urinary incontinence pessary ring with support keep falling off  Plan   No Pap needed  Diet/exercise  Calcium/vitamin D  Kegel exercise recommended patient is having vaginal laxity cystocele and uterovaginal prolapse patient desires to hold off on physical therapy for now  DEXA scan  Mammogram  Up-to-date with colonoscopy   All questions answered.     There are no Patient Instructions on file for this visit.

## 2024-08-21 ENCOUNTER — OFFICE VISIT (OUTPATIENT)
Dept: PULMONOLOGY | Facility: MEDICAL CENTER | Age: 84
End: 2024-08-21
Payer: MEDICARE

## 2024-08-21 VITALS
BODY MASS INDEX: 33.49 KG/M2 | DIASTOLIC BLOOD PRESSURE: 60 MMHG | RESPIRATION RATE: 16 BRPM | OXYGEN SATURATION: 97 % | SYSTOLIC BLOOD PRESSURE: 140 MMHG | WEIGHT: 189 LBS | HEIGHT: 63 IN | HEART RATE: 67 BPM | TEMPERATURE: 98.4 F

## 2024-08-21 DIAGNOSIS — R05.2 SUBACUTE COUGH: ICD-10-CM

## 2024-08-21 DIAGNOSIS — J45.20 MILD INTERMITTENT ASTHMA WITHOUT COMPLICATION: Primary | ICD-10-CM

## 2024-08-21 DIAGNOSIS — J30.1 SEASONAL ALLERGIC RHINITIS DUE TO POLLEN: ICD-10-CM

## 2024-08-21 DIAGNOSIS — Z86.16 HISTORY OF COVID-19: ICD-10-CM

## 2024-08-21 PROCEDURE — 99214 OFFICE O/P EST MOD 30 MIN: CPT | Performed by: INTERNAL MEDICINE

## 2024-08-21 RX ORDER — BUDESONIDE, GLYCOPYRROLATE, AND FORMOTEROL FUMARATE 160; 9; 4.8 UG/1; UG/1; UG/1
AEROSOL, METERED RESPIRATORY (INHALATION)
Qty: 10.7 G | Refills: 0 | Status: SHIPPED | OUTPATIENT
Start: 2024-08-21

## 2024-08-21 NOTE — PROGRESS NOTES
Assessment & Plan        Problem List Items Addressed This Visit          Respiratory    Seasonal allergic rhinitis due to pollen     I did recommend she try using Flonase nasal spray at night and maybe try over-the-counter antihistamine such as Claritin         Mild intermittent asthma without complication - Primary     She is having some increased shortness of breath and cough at nighttime.  I did give her sample of Breztri to try 2 puffs at bedtime and to rinse her mouth after using.  Does not help her I told her she can contact my office for a sample of Trelegy 100 mcg 1 puff daily to try         Relevant Medications    Budeson-Glycopyrrol-Formoterol (Breztri Aerosphere) 160-9-4.8 MCG/ACT AERO       Other    Subacute cough     Has been having some cough particularly at nighttime.  I did recommend she start using Flonase nasal spray and even try Claritin over-the-counter.    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  Sometimes will feel like she has some shortness of breath at night and cough.  Does get postnasal drainage.  Does have nasal congestion.  Sometimes will wake up and take liquid Airborne supplement which she says helps.  Right now not using her rescue levalbuterol inhaler or any other inhaler.  During the day she is doing okay without any shortness of breath.  She does have history of mild intermittent asthma she does have seasonal allergic rhinitis.  She still works part-time as a hairdresser and she does like working still.  It helps her keep involved with people she enjoys the job.  She says sometimes they hairsprays do bother her a little bit.   She does take Flonase sometimes    Does have history of seasonal allergic rhinitis and does get some postnasal drainage        Past Medical History:   Diagnosis Date    Anxiety     Benign essential hypertension 05/08/2014    Bleeding from varicose vein     Environmental allergies     Obesity     Scoliosis     mild        Past Surgical History:   Procedure Laterality Date    COLONOSCOPY      EYE SURGERY      cataract    CT HYSTEROSCOPY BX ENDOMETRIUM&/POLYPC W/WO D&C N/A 8/21/2023    Procedure: DILATATION AND CURETTAGE (D&C) WITH HYSTEROSCOPY;  Surgeon: Kb Rueda MD;  Location:  MAIN OR;  Service: Gynecology    TUBAL LIGATION           Current Outpatient Medications:     acetaminophen (TYLENOL) 500 mg tablet, Take 500 mg by mouth every 6 (six) hours as needed for mild pain, Disp: , Rfl:     albuterol (Ventolin HFA) 90 mcg/act inhaler, Inhale 2 puffs every 6 (six) hours as needed for wheezing, Disp: 18 g, Rfl: 6    B COMPLEX VITAMINS PO, Take by mouth, Disp: , Rfl:     Budeson-Glycopyrrol-Formoterol (Breztri Aerosphere) 160-9-4.8 MCG/ACT AERO, Take 2 puffs once a day at bedtime.  If have increased shortness of breath can even use up to 2 puffs twice a day, Disp: 10.7 g, Rfl: 0    fluticasone (FLONASE) 50 mcg/act nasal spray, 1 spray into each nostril daily, Disp: 11.1 mL, Rfl: 0    levalbuterol (Xopenex HFA) 45 mcg/act inhaler, Inhale 1-2 puffs every 4 (four) hours as needed for wheezing, Disp: 15 g, Rfl: 6    Naproxen Sodium (ALEVE PO), Take by mouth, Disp: , Rfl:     nystatin-triamcinolone (MYCOLOG-II) cream, apply to affected area twice a day, Disp: 60 g, Rfl: 1    zinc gluconate 50 mg tablet, Take 50 mg by mouth if needed, Disp: , Rfl:     benzonatate (TESSALON PERLES) 100 mg capsule, Take 1 capsule (100 mg total) by mouth 3 (three) times a day as needed for cough (Patient not taking: Reported on 8/21/2024), Disp: 20 capsule, Rfl: 0    Allergies   Allergen Reactions    Penicillins Other (See Comments)      "\"Just doesn't work.\"    Sulfa Antibiotics Itching       Social History     Tobacco Use    Smoking status: Never    Smokeless tobacco: Never   Substance Use Topics    Alcohol use: Yes     Comment: social          Family History   Problem Relation Age of Onset    Stroke Mother     Colon cancer Maternal Aunt        Review of Systems   Constitutional:  Negative for chills, fever and unexpected weight change.   HENT:  Positive for postnasal drip. Negative for congestion, rhinorrhea and sore throat.    Eyes:  Negative for discharge and redness.   Respiratory:  Positive for cough.    Cardiovascular:  Negative for chest pain, palpitations and leg swelling.   Gastrointestinal:  Negative for abdominal distention, abdominal pain and nausea.   Endocrine: Negative for polydipsia and polyphagia.   Genitourinary:  Negative for dysuria.   Musculoskeletal:  Negative for joint swelling and myalgias.   Skin:  Negative for rash.   Neurological:  Negative for light-headedness.   Psychiatric/Behavioral:  Negative for decreased concentration.            Vitals:    08/21/24 1023   BP: 140/60   Pulse: 67   Resp: 16   Temp: 98.4 °F (36.9 °C)   SpO2: 97%     Height: 5' 3\" (160 cm)  IBW (Ideal Body Weight): 52.4 kg  Body mass index is 33.48 kg/m².  Weight (last 2 days)       None                Physical Exam  Constitutional:       General: She is not in acute distress.     Appearance: Normal appearance. She is well-developed.   HENT:      Head: Normocephalic.      Right Ear: External ear normal.      Left Ear: External ear normal.      Nose: Nose normal.      Mouth/Throat:      Mouth: Oropharynx is clear and moist. Mucous membranes are moist.      Pharynx: Oropharynx is clear. No oropharyngeal exudate.   Eyes:      Conjunctiva/sclera: Conjunctivae normal.      Pupils: Pupils are equal, round, and reactive to light.   Cardiovascular:      Rate and Rhythm: Normal rate and regular rhythm.      Heart sounds: Normal heart sounds.   Pulmonary:      " Effort: Pulmonary effort is normal.      Comments: Lung sounds are clear.  No wheezes, crackles or rhonchi  Abdominal:      General: There is no distension.      Palpations: Abdomen is soft.      Tenderness: There is no abdominal tenderness.   Musculoskeletal:      Comments: No edema, cyanosis or clubbing   Skin:     General: Skin is warm and dry.   Neurological:      General: No focal deficit present.      Mental Status: She is alert and oriented to person, place, and time.   Psychiatric:         Mood and Affect: Mood and affect and mood normal.         Behavior: Behavior normal.         Thought Content: Thought content normal.

## 2024-08-21 NOTE — PATIENT INSTRUCTIONS
Use Breztri 2 puffs at bedtime and rinse mouth after using.  If this does not help you can stop by office and I can give you sample of Trelegy 100 mcg to use 1 puff daily    If you need a rescue lev albuterol inhaler me know and I will place prescription for    Use your Flonase at bedtime.  Also try using oral antihistamine such as Claritin or Allegra    Consort unscented hair spray

## 2024-08-24 PROBLEM — R05.2 SUBACUTE COUGH: Status: ACTIVE | Noted: 2018-04-23

## 2024-08-24 NOTE — ASSESSMENT & PLAN NOTE
I did recommend she try using Flonase nasal spray at night and maybe try over-the-counter antihistamine such as Claritin

## 2024-08-24 NOTE — ASSESSMENT & PLAN NOTE
She is having some increased shortness of breath and cough at nighttime.  I did give her sample of Breztri to try 2 puffs at bedtime and to rinse her mouth after using.  Does not help her I told her she can contact my office for a sample of Trelegy 100 mcg 1 puff daily to try

## 2024-08-24 NOTE — ASSESSMENT & PLAN NOTE
Has been having some cough particularly at nighttime.  I did recommend she start using Flonase nasal spray and even try Claritin over-the-counter.    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

## 2024-09-23 PROBLEM — R05.2 SUBACUTE COUGH: Status: RESOLVED | Noted: 2018-04-23 | Resolved: 2024-09-23

## 2025-01-21 ENCOUNTER — TELEPHONE (OUTPATIENT)
Dept: PULMONOLOGY | Facility: MEDICAL CENTER | Age: 85
End: 2025-01-21

## 2025-01-21 NOTE — TELEPHONE ENCOUNTER
LM for patient to call office back to schedule follow up appointment. Patient is due in February for a 6m follow up appointment.

## 2025-01-30 ENCOUNTER — HOSPITAL ENCOUNTER (OUTPATIENT)
Dept: RADIOLOGY | Facility: HOSPITAL | Age: 85
Discharge: HOME | End: 2025-01-30
Attending: OBSTETRICS & GYNECOLOGY
Payer: MEDICARE

## 2025-01-30 VITALS — BODY MASS INDEX: 33.49 KG/M2 | HEIGHT: 63 IN | WEIGHT: 189 LBS

## 2025-01-30 DIAGNOSIS — Z12.31 SCREENING MAMMOGRAM, ENCOUNTER FOR: ICD-10-CM

## 2025-01-30 DIAGNOSIS — Z78.0 MENOPAUSE: ICD-10-CM

## 2025-01-30 PROCEDURE — 77080 DXA BONE DENSITY AXIAL: CPT

## 2025-01-30 PROCEDURE — 77067 SCR MAMMO BI INCL CAD: CPT

## 2025-01-30 PROCEDURE — 77063 BREAST TOMOSYNTHESIS BI: CPT

## 2025-02-04 ENCOUNTER — RESULTS FOLLOW-UP (OUTPATIENT)
Dept: OBGYN CLINIC | Facility: CLINIC | Age: 85
End: 2025-02-04

## 2025-04-23 ENCOUNTER — VBI (OUTPATIENT)
Dept: ADMINISTRATIVE | Facility: OTHER | Age: 85
End: 2025-04-23

## 2025-04-23 NOTE — TELEPHONE ENCOUNTER
Patient contacted to schedule Annual Wellness Visit.   Patient agreed to schedule appointment.      Thank you.  Joanna Cook MA  [facility]

## 2025-05-06 ENCOUNTER — OFFICE VISIT (OUTPATIENT)
Dept: FAMILY MEDICINE CLINIC | Facility: CLINIC | Age: 85
End: 2025-05-06
Payer: MEDICARE

## 2025-05-06 VITALS
DIASTOLIC BLOOD PRESSURE: 68 MMHG | HEART RATE: 71 BPM | WEIGHT: 176 LBS | SYSTOLIC BLOOD PRESSURE: 116 MMHG | BODY MASS INDEX: 33.23 KG/M2 | RESPIRATION RATE: 14 BRPM | OXYGEN SATURATION: 98 % | HEIGHT: 61 IN | TEMPERATURE: 97.8 F

## 2025-05-06 DIAGNOSIS — E55.9 VITAMIN D DEFICIENCY: ICD-10-CM

## 2025-05-06 DIAGNOSIS — R53.83 OTHER FATIGUE: ICD-10-CM

## 2025-05-06 DIAGNOSIS — Z13.29 SCREENING FOR THYROID DISORDER: ICD-10-CM

## 2025-05-06 DIAGNOSIS — Z13.0 SCREENING FOR DEFICIENCY ANEMIA: ICD-10-CM

## 2025-05-06 DIAGNOSIS — Z13.1 SCREENING FOR DIABETES MELLITUS: ICD-10-CM

## 2025-05-06 DIAGNOSIS — J45.20 MILD INTERMITTENT ASTHMA WITHOUT COMPLICATION: ICD-10-CM

## 2025-05-06 DIAGNOSIS — Z11.59 NEED FOR HEPATITIS C SCREENING TEST: ICD-10-CM

## 2025-05-06 DIAGNOSIS — Z13.6 SCREENING FOR CARDIOVASCULAR CONDITION: ICD-10-CM

## 2025-05-06 DIAGNOSIS — E53.8 VITAMIN B12 DEFICIENCY: ICD-10-CM

## 2025-05-06 DIAGNOSIS — Z00.00 MEDICARE ANNUAL WELLNESS VISIT, SUBSEQUENT: Primary | ICD-10-CM

## 2025-05-06 PROCEDURE — G0439 PPPS, SUBSEQ VISIT: HCPCS | Performed by: NURSE PRACTITIONER

## 2025-05-06 NOTE — PROGRESS NOTES
Name: Khadra Anderson      : 1940      MRN: [id number]  Encounter Provider: MARIPOSA Diego  Encounter Date: 2025   Encounter department: [facility]  :  Assessment & Plan  Medicare annual wellness visit, subsequent         Screening for diabetes mellitus    Orders:  •  Comprehensive metabolic panel; Future    Screening for deficiency anemia    Orders:  •  CBC; Future    Screening for thyroid disorder    Orders:  •  TSH, 3rd generation with Free T4 reflex; Future    Screening for cardiovascular condition    Orders:  •  Lipid Panel with Direct LDL reflex; Future    Vitamin B12 deficiency    Orders:  •  Vitamin B12; Future  •  Fe+TIBC+Chalo; Future    Need for hepatitis C screening test    Orders:  •  Hepatitis C antibody; Future    Vitamin D deficiency    Orders:  •  Fe+TIBC+Chalo; Future  •  Vitamin D 25 hydroxy; Future    Mild intermittent asthma without complication    Orders:  •  Fe+TIBC+Chalo; Future    Other fatigue    Orders:  •  Fe+TIBC+Chalo; Future       Preventive health issues were discussed with patient, and age appropriate screening tests were ordered as noted in patient's After Visit Summary. Personalized health advice and appropriate referrals for health education or preventive services given if needed, as noted in patient's After Visit Summary.    History of Present Illness     Here for AWV.  Stated that been fatigue from a year.  Denies chest pain, sob, headache and dizziness         Patient Care Team:  Marialuisa Rasmussen MD as PCP - General (Internal Medicine)  Yo Saab MD    Review of Systems   Constitutional:  Positive for fatigue.   HENT: Negative.     Respiratory: Negative.     Cardiovascular: Negative.    Gastrointestinal: Negative.    Neurological: Negative.      Medical History Reviewed by provider this encounter:  Tobacco  Allergies  Meds  Problems  Med Hx  Surg Hx  Fam Hx       Annual Wellness Visit Questionnaire   Khadra is here for her Subsequent Wellness  visit. Last Medicare Wellness visit information reviewed, patient interviewed and updates made to the record today.      Health Risk Assessment:   Patient rates overall health as good. Patient feels that their physical health rating is slightly worse. Patient is very satisfied with their life. Eyesight was rated as same. Hearing was rated as same. Patient feels that their emotional and mental health rating is much better. Patients states they are never, rarely angry. Patient states they are never, rarely unusually tired/fatigued. Pain experienced in the last 7 days has been none. Patient states that she has experienced weight loss or gain in last 6 months.     Depression Screening:   PHQ-2 Score: 0      Fall Risk Screening:   In the past year, patient has experienced: no history of falling in past year      Urinary Incontinence Screening:   Patient has not leaked urine accidently in the last six months.     Home Safety:  Patient does not have trouble with stairs inside or outside of their home. Patient has working smoke alarms and has working carbon monoxide detector. Home safety hazards include: none.     Nutrition:   Current diet is Regular.     Medications:   Patient is currently taking over-the-counter supplements. OTC medications include: see medication list. Patient is able to manage medications.     Activities of Daily Living (ADLs)/Instrumental Activities of Daily Living (IADLs):   Walk and transfer into and out of bed and chair?: Yes  Dress and groom yourself?: Yes    Bathe or shower yourself?: Yes    Feed yourself? Yes  Do your laundry/housekeeping?: Yes  Manage your money, pay your bills and track your expenses?: Yes  Make your own meals?: Yes    Do your own shopping?: Yes    Previous Hospitalizations:   Any hospitalizations or ED visits within the last 12 months?: No      Advance Care Planning:   Living will: Yes    Durable POA for healthcare: Yes    Advanced directive: Yes    Advanced directive  counseling given: Yes    Five wishes given: No    Patient declined ACP directive: No      Cognitive Screening:   Provider or family/friend/caregiver concerned regarding cognition?: No    Preventive Screenings      Cardiovascular Screening:    General: Risks and Benefits Discussed    Due for: Lipid Panel      Diabetes Screening:     General: Risks and Benefits Discussed    Due for: Blood Glucose      Colorectal Cancer Screening:     General: Screening Current      Breast Cancer Screening:     General: Screening Current      Cervical Cancer Screening:    General: Screening Not Indicated      Osteoporosis Screening:    General: Screening Current      Abdominal Aortic Aneurysm (AAA) Screening:        General: Screening Not Indicated      Lung Cancer Screening:     General: Screening Not Indicated      Hepatitis C Screening:    General: Risks and Benefits Discussed    Hep C Screening Accepted: Yes      Immunizations:  - Immunizations due: Zoster (Shingrix)    Screening, Brief Intervention, and Referral to Treatment (SBIRT)     Screening  Typical number of drinks in a day: 0  Typical number of drinks in a week: 0  Interpretation: Low risk drinking behavior.    Single Item Drug Screening:  How often have you used an illegal drug (including marijuana) or a prescription medication for non-medical reasons in the past year? never    Single Item Drug Screen Score: 0  Interpretation: Negative screen for possible drug use disorder    Brief Intervention  Alcohol & drug use screenings were reviewed. No concerns regarding substance use disorder identified.     Other Counseling Topics:   Car/seat belt/driving safety, skin self-exam, sunscreen and calcium and vitamin D intake and regular weightbearing exercise.     Social Drivers of Health     Financial Resource Strain: Low Risk  (9/18/2023)    Overall Financial Resource Strain (CARDIA)    • Difficulty of Paying Living Expenses: Not hard at all   Food Insecurity: No Food Insecurity  "(5/6/2025)    Hunger Vital Sign    • Worried About Running Out of Food in the Last Year: Never true    • Ran Out of Food in the Last Year: Never true   Transportation Needs: No Transportation Needs (5/6/2025)    PRAPARE - Transportation    • Lack of Transportation (Medical): No    • Lack of Transportation (Non-Medical): No   Housing Stability: Low Risk  (5/6/2025)    Housing Stability Vital Sign    • Unable to Pay for Housing in the Last Year: No    • Number of Times Moved in the Last Year: 0    • Homeless in the Last Year: No   Utilities: Not At Risk (5/6/2025)    [facility] Utilities    • Threatened with loss of utilities: No     No results found.    Objective   /68   Pulse 71   Temp 97.8 °F (36.6 °C)   Resp 14   Ht 5' 1.42\" (1.56 m)   Wt 79.8 kg (176 lb)   SpO2 98%   BMI 32.80 kg/m²     Physical Exam  HENT:      Head: Normocephalic.   Eyes:      Conjunctiva/sclera: Conjunctivae normal.   Cardiovascular:      Rate and Rhythm: Normal rate and regular rhythm.      Pulses: Normal pulses.      Heart sounds: Normal heart sounds.   Pulmonary:      Effort: Pulmonary effort is normal.      Breath sounds: Normal breath sounds.   Skin:     General: Skin is warm and dry.   Neurological:      Mental Status: She is alert and oriented to person, place, and time.   Psychiatric:         Mood and Affect: Mood normal.         Behavior: Behavior normal.         Thought Content: Thought content normal.         Judgment: Judgment normal.         "

## 2025-05-06 NOTE — PATIENT INSTRUCTIONS
Medicare Preventive Visit Patient Instructions  Thank you for completing your Welcome to Medicare Visit or Medicare Annual Wellness Visit today. Your next wellness visit will be due in one year (5/7/2026).  The screening/preventive services that you may require over the next 5-10 years are detailed below. Some tests may not apply to you based off risk factors and/or age. Screening tests ordered at today's visit but not completed yet may show as past due. Also, please note that scanned in results may not display below.  Preventive Screenings:  Service Recommendations Previous Testing/Comments   Colorectal Cancer Screening  * Colonoscopy    * Fecal Occult Blood Test (FOBT)/Fecal Immunochemical Test (FIT)  * Fecal DNA/Cologuard Test  * Flexible Sigmoidoscopy Age: 45-75 years old   Colonoscopy: every 10 years (may be performed more frequently if at higher risk)  OR  FOBT/FIT: every 1 year  OR  Cologuard: every 3 years  OR  Sigmoidoscopy: every 5 years  Screening may be recommended earlier than age 45 if at higher risk for colorectal cancer. Also, an individualized decision between you and your healthcare provider will decide whether screening between the ages of 76-85 would be appropriate. Colonoscopy: 06/30/2023  FOBT/FIT: Not on file  Cologuard: Not on file  Sigmoidoscopy: Not on file    Screening Current     Breast Cancer Screening Age: 40+ years old  Frequency: every 1-2 years  Not required if history of left and right mastectomy Mammogram: 01/30/2025    Screening Current   Cervical Cancer Screening Between the ages of 21-29, pap smear recommended once every 3 years.   Between the ages of 30-65, can perform pap smear with HPV co-testing every 5 years.   Recommendations may differ for women with a history of total hysterectomy, cervical cancer, or abnormal pap smears in past. Pap Smear: 06/24/2024    Screening Not Indicated   Hepatitis C Screening Once for adults born between 1945 and 1965  More frequently in  patients at high risk for Hepatitis C Hep C Antibody: Not on file        Diabetes Screening 1-2 times per year if you're at risk for diabetes or have pre-diabetes Fasting glucose: 98 mg/dL (10/10/2022)  A1C: No results in last 5 years (No results in last 5 years)      Cholesterol Screening Once every 5 years if you don't have a lipid disorder. May order more often based on risk factors. Lipid panel: 10/10/2022    Screening Current     Other Preventive Screenings Covered by Medicare:  Abdominal Aortic Aneurysm (AAA) Screening: covered once if your at risk. You're considered to be at risk if you have a family history of AAA.  Lung Cancer Screening: covers low dose CT scan once per year if you meet all of the following conditions: (1) Age 55-77; (2) No signs or symptoms of lung cancer; (3) Current smoker or have quit smoking within the last 15 years; (4) You have a tobacco smoking history of at least 20 pack years (packs per day multiplied by number of years you smoked); (5) You get a written order from a healthcare provider.  Glaucoma Screening: covered annually if you're considered high risk: (1) You have diabetes OR (2) Family history of glaucoma OR (3)  aged 50 and older OR (4)  American aged 65 and older  Osteoporosis Screening: covered every 2 years if you meet one of the following conditions: (1) You're estrogen deficient and at risk for osteoporosis based off medical history and other findings; (2) Have a vertebral abnormality; (3) On glucocorticoid therapy for more than 3 months; (4) Have primary hyperparathyroidism; (5) On osteoporosis medications and need to assess response to drug therapy.   Last bone density test (DXA Scan): 01/30/2025.  HIV Screening: covered annually if you're between the age of 15-65. Also covered annually if you are younger than 15 and older than 65 with risk factors for HIV infection. For pregnant patients, it is covered up to 3 times per  pregnancy.    Immunizations:  Immunization Recommendations   Influenza Vaccine Annual influenza vaccination during flu season is recommended for all persons aged >= 6 months who do not have contraindications   Pneumococcal Vaccine   * Pneumococcal conjugate vaccine = PCV13 (Prevnar 13), PCV15 (Vaxneuvance), PCV20 (Prevnar 20)  * Pneumococcal polysaccharide vaccine = PPSV23 (Pneumovax) Adults 19-65 yo with certain risk factors or if 65+ yo  If never received any pneumonia vaccine: recommend Prevnar 20 (PCV20)  Give PCV20 if previously received 1 dose of PCV13 or PPSV23   Hepatitis B Vaccine 3 dose series if at intermediate or high risk (ex: diabetes, end stage renal disease, liver disease)   Respiratory syncytial virus (RSV) Vaccine - COVERED BY MEDICARE PART D  * RSVPreF3 (Arexvy) CDC recommends that adults 60 years of age and older may receive a single dose of RSV vaccine using shared clinical decision-making (SCDM)   Tetanus (Td) Vaccine - COST NOT COVERED BY MEDICARE PART B Following completion of primary series, a booster dose should be given every 10 years to maintain immunity against tetanus. Td may also be given as tetanus wound prophylaxis.   Tdap Vaccine - COST NOT COVERED BY MEDICARE PART B Recommended at least once for all adults. For pregnant patients, recommended with each pregnancy.   Shingles Vaccine (Shingrix) - COST NOT COVERED BY MEDICARE PART B  2 shot series recommended in those 19 years and older who have or will have weakened immune systems or those 50 years and older     Health Maintenance Due:      Topic Date Due   • Colorectal Cancer Screening  06/27/2033     Immunizations Due:      Topic Date Due   • COVID-19 Vaccine (5 - 2024-25 season) 09/01/2024     Advance Directives   What are advance directives?  Advance directives are legal documents that state your wishes and plans for medical care. These plans are made ahead of time in case you lose your ability to make decisions for yourself.  Advance directives can apply to any medical decision, such as the treatments you want, and if you want to donate organs.   What are the types of advance directives?  There are many types of advance directives, and each state has rules about how to use them. You may choose a combination of any of the following:  Living will:  This is a written record of the treatment you want. You can also choose which treatments you do not want, which to limit, and which to stop at a certain time. This includes surgery, medicine, IV fluid, and tube feedings.   Durable power of  for healthcare (DPAHC):  This is a written record that states who you want to make healthcare choices for you when you are unable to make them for yourself. This person, called a proxy, is usually a family member or a friend. You may choose more than 1 proxy.  Do not resuscitate (DNR) order:  A DNR order is used in case your heart stops beating or you stop breathing. It is a request not to have certain forms of treatment, such as CPR. A DNR order may be included in other types of advance directives.  Medical directive:  This covers the care that you want if you are in a coma, near death, or unable to make decisions for yourself. You can list the treatments you want for each condition. Treatment may include pain medicine, surgery, blood transfusions, dialysis, IV or tube feedings, and a ventilator (breathing machine).  Values history:  This document has questions about your views, beliefs, and how you feel and think about life. This information can help others choose the care that you would choose.  Why are advance directives important?  An advance directive helps you control your care. Although spoken wishes may be used, it is better to have your wishes written down. Spoken wishes can be misunderstood, or not followed. Treatments may be given even if you do not want them. An advance directive may make it easier for your family to make difficult  choices about your care.   Weight Management   Why it is important to manage your weight:  Being overweight increases your risk of health conditions such as heart disease, high blood pressure, type 2 diabetes, and certain types of cancer. It can also increase your risk for osteoarthritis, sleep apnea, and other respiratory problems. Aim for a slow, steady weight loss. Even a small amount of weight loss can lower your risk of health problems.  How to lose weight safely:  A safe and healthy way to lose weight is to eat fewer calories and get regular exercise. You can lose up about 1 pound a week by decreasing the number of calories you eat by 500 calories each day.   Healthy meal plan for weight management:  A healthy meal plan includes a variety of foods, contains fewer calories, and helps you stay healthy. A healthy meal plan includes the following:  Eat whole-grain foods more often.  A healthy meal plan should contain fiber. Fiber is the part of grains, fruits, and vegetables that is not broken down by your body. Whole-grain foods are healthy and provide extra fiber in your diet. Some examples of whole-grain foods are whole-wheat breads and pastas, oatmeal, brown rice, and bulgur.  Eat a variety of vegetables every day.  Include dark, leafy greens such as spinach, kale, kofi greens, and mustard greens. Eat yellow and orange vegetables such as carrots, sweet potatoes, and winter squash.   Eat a variety of fruits every day.  Choose fresh or canned fruit (canned in its own juice or light syrup) instead of juice. Fruit juice has very little or no fiber.  Eat low-fat dairy foods.  Drink fat-free (skim) milk or 1% milk. Eat fat-free yogurt and low-fat cottage cheese. Try low-fat cheeses such as mozzarella and other reduced-fat cheeses.  Choose meat and other protein foods that are low in fat.  Choose beans or other legumes such as split peas or lentils. Choose fish, skinless poultry (chicken or turkey), or lean cuts  of red meat (beef or pork). Before you cook meat or poultry, cut off any visible fat.   Use less fat and oil.  Try baking foods instead of frying them. Add less fat, such as margarine, sour cream, regular salad dressing and mayonnaise to foods. Eat fewer high-fat foods. Some examples of high-fat foods include french fries, doughnuts, ice cream, and cakes.  Eat fewer sweets.  Limit foods and drinks that are high in sugar. This includes candy, cookies, regular soda, and sweetened drinks.  Exercise:  Exercise at least 30 minutes per day on most days of the week. Some examples of exercise include walking, biking, dancing, and swimming. You can also fit in more physical activity by taking the stairs instead of the elevator or parking farther away from stores. Ask your healthcare provider about the best exercise plan for you.      © Copyright [employer] 2018 Information is for End User's use only and may not be sold, redistributed or otherwise used for commercial purposes. All illustrations and images included in CareNotes® are the copyrighted property of [employer] or [employer]

## 2025-05-08 ENCOUNTER — APPOINTMENT (OUTPATIENT)
Dept: LAB | Facility: HOSPITAL | Age: 85
End: 2025-05-08
Attending: NURSE PRACTITIONER
Payer: MEDICARE

## 2025-05-08 DIAGNOSIS — R53.83 OTHER FATIGUE: ICD-10-CM

## 2025-05-08 DIAGNOSIS — J45.20 MILD INTERMITTENT ASTHMA WITHOUT COMPLICATION: ICD-10-CM

## 2025-05-08 DIAGNOSIS — E55.9 VITAMIN D DEFICIENCY: ICD-10-CM

## 2025-05-08 DIAGNOSIS — Z13.29 SCREENING FOR THYROID DISORDER: ICD-10-CM

## 2025-05-08 DIAGNOSIS — Z13.0 SCREENING FOR DEFICIENCY ANEMIA: ICD-10-CM

## 2025-05-08 DIAGNOSIS — Z11.59 NEED FOR HEPATITIS C SCREENING TEST: ICD-10-CM

## 2025-05-08 DIAGNOSIS — Z13.1 SCREENING FOR DIABETES MELLITUS: ICD-10-CM

## 2025-05-08 DIAGNOSIS — E53.8 VITAMIN B12 DEFICIENCY: ICD-10-CM

## 2025-05-08 DIAGNOSIS — Z13.6 SCREENING FOR CARDIOVASCULAR CONDITION: ICD-10-CM

## 2025-05-08 LAB
25(OH)D3 SERPL-MCNC: 19.1 NG/ML (ref 30–100)
ALBUMIN SERPL BCG-MCNC: 3.8 G/DL (ref 3.5–5)
ALP SERPL-CCNC: 66 U/L (ref 34–104)
ALT SERPL W P-5'-P-CCNC: 7 U/L (ref 7–52)
ANION GAP SERPL CALCULATED.3IONS-SCNC: 8 MMOL/L (ref 4–13)
AST SERPL W P-5'-P-CCNC: 12 U/L (ref 13–39)
BILIRUB SERPL-MCNC: 0.58 MG/DL (ref 0.2–1)
BUN SERPL-MCNC: 22 MG/DL (ref 5–25)
CALCIUM SERPL-MCNC: 8.7 MG/DL (ref 8.4–10.2)
CHLORIDE SERPL-SCNC: 108 MMOL/L (ref 96–108)
CHOLEST SERPL-MCNC: 157 MG/DL (ref ?–200)
CO2 SERPL-SCNC: 24 MMOL/L (ref 21–32)
CREAT SERPL-MCNC: 0.61 MG/DL (ref 0.6–1.3)
ERYTHROCYTE [DISTWIDTH] IN BLOOD BY AUTOMATED COUNT: 15.5 % (ref 11.6–15.1)
FERRITIN SERPL-MCNC: 221 NG/ML (ref 30–307)
GFR SERPL CREATININE-BSD FRML MDRD: 83 ML/MIN/1.73SQ M
GLUCOSE P FAST SERPL-MCNC: 100 MG/DL (ref 65–99)
HCT VFR BLD AUTO: 38.7 % (ref 34.8–46.1)
HDLC SERPL-MCNC: 46 MG/DL
HGB BLD-MCNC: 11.7 G/DL (ref 11.5–15.4)
IRON SATN MFR SERPL: 29 % (ref 15–50)
IRON SERPL-MCNC: 97 UG/DL (ref 50–212)
LDLC SERPL CALC-MCNC: 96 MG/DL (ref 0–100)
MCH RBC QN AUTO: 21.3 PG (ref 26.8–34.3)
MCHC RBC AUTO-ENTMCNC: 30.2 G/DL (ref 31.4–37.4)
MCV RBC AUTO: 70 FL (ref 82–98)
PLATELET # BLD AUTO: 239 THOUSANDS/UL (ref 149–390)
PMV BLD AUTO: 9.2 FL (ref 8.9–12.7)
POTASSIUM SERPL-SCNC: 4.5 MMOL/L (ref 3.5–5.3)
PROT SERPL-MCNC: 6.9 G/DL (ref 6.4–8.4)
RBC # BLD AUTO: 5.5 MILLION/UL (ref 3.81–5.12)
SODIUM SERPL-SCNC: 140 MMOL/L (ref 135–147)
TIBC SERPL-MCNC: 336 UG/DL (ref 250–450)
TRANSFERRIN SERPL-MCNC: 240 MG/DL (ref 203–362)
TRIGL SERPL-MCNC: 75 MG/DL (ref ?–150)
TSH SERPL DL<=0.05 MIU/L-ACNC: 2.39 UIU/ML (ref 0.45–4.5)
UIBC SERPL-MCNC: 239 UG/DL (ref 155–355)
VIT B12 SERPL-MCNC: 397 PG/ML (ref 180–914)
WBC # BLD AUTO: 4.23 THOUSAND/UL (ref 4.31–10.16)

## 2025-05-08 PROCEDURE — 82607 VITAMIN B-12: CPT

## 2025-05-08 PROCEDURE — 84443 ASSAY THYROID STIM HORMONE: CPT

## 2025-05-08 PROCEDURE — 82306 VITAMIN D 25 HYDROXY: CPT

## 2025-05-08 PROCEDURE — 85027 COMPLETE CBC AUTOMATED: CPT

## 2025-05-08 PROCEDURE — 82728 ASSAY OF FERRITIN: CPT

## 2025-05-08 PROCEDURE — 83540 ASSAY OF IRON: CPT

## 2025-05-08 PROCEDURE — 83550 IRON BINDING TEST: CPT

## 2025-05-08 PROCEDURE — 36415 COLL VENOUS BLD VENIPUNCTURE: CPT

## 2025-05-08 PROCEDURE — 80053 COMPREHEN METABOLIC PANEL: CPT

## 2025-05-08 PROCEDURE — 86803 HEPATITIS C AB TEST: CPT

## 2025-05-08 PROCEDURE — 80061 LIPID PANEL: CPT

## 2025-05-09 ENCOUNTER — RESULTS FOLLOW-UP (OUTPATIENT)
Dept: FAMILY MEDICINE CLINIC | Facility: CLINIC | Age: 85
End: 2025-05-09

## 2025-05-09 DIAGNOSIS — E55.9 VITAMIN D DEFICIENCY: Primary | ICD-10-CM

## 2025-05-09 LAB — HCV AB SER QL: NORMAL

## 2025-05-09 RX ORDER — ERGOCALCIFEROL 1.25 MG/1
50000 CAPSULE, LIQUID FILLED ORAL WEEKLY
Qty: 12 CAPSULE | Refills: 0 | Status: SHIPPED | OUTPATIENT
Start: 2025-05-09 | End: 2025-07-26

## 2025-05-22 ENCOUNTER — OFFICE VISIT (OUTPATIENT)
Dept: PULMONOLOGY | Facility: MEDICAL CENTER | Age: 85
End: 2025-05-22
Payer: MEDICARE

## 2025-05-22 VITALS
HEIGHT: 63 IN | SYSTOLIC BLOOD PRESSURE: 128 MMHG | OXYGEN SATURATION: 98 % | WEIGHT: 182 LBS | BODY MASS INDEX: 32.25 KG/M2 | HEART RATE: 55 BPM | TEMPERATURE: 97.8 F | DIASTOLIC BLOOD PRESSURE: 78 MMHG | RESPIRATION RATE: 12 BRPM

## 2025-05-22 DIAGNOSIS — G47.33 OBSTRUCTIVE SLEEP APNEA: ICD-10-CM

## 2025-05-22 DIAGNOSIS — J45.20 MILD INTERMITTENT ASTHMA WITHOUT COMPLICATION: Primary | ICD-10-CM

## 2025-05-22 DIAGNOSIS — J30.1 SEASONAL ALLERGIC RHINITIS DUE TO POLLEN: ICD-10-CM

## 2025-05-22 PROCEDURE — 99214 OFFICE O/P EST MOD 30 MIN: CPT | Performed by: INTERNAL MEDICINE

## 2025-05-22 PROCEDURE — 95012 NITRIC OXIDE EXP GAS DETER: CPT | Performed by: INTERNAL MEDICINE

## 2025-05-22 RX ORDER — ALBUTEROL SULFATE 90 UG/1
2 INHALANT RESPIRATORY (INHALATION) EVERY 4 HOURS PRN
Qty: 8.5 G | Refills: 7 | Status: SHIPPED | OUTPATIENT
Start: 2025-05-22

## 2025-05-22 RX ORDER — FLUTICASONE FUROATE 100 UG/1
1 POWDER RESPIRATORY (INHALATION) DAILY
Qty: 30 BLISTER | Refills: 0 | Status: SHIPPED | OUTPATIENT
Start: 2025-05-22 | End: 2025-06-21

## 2025-05-22 RX ORDER — FLUTICASONE PROPIONATE 50 MCG
1 SPRAY, SUSPENSION (ML) NASAL DAILY
Qty: 16 G | Refills: 7 | Status: SHIPPED | OUTPATIENT
Start: 2025-05-22

## 2025-05-22 NOTE — PROGRESS NOTES
Follow-up  Visit - Pulmonary Medicine   Name: Khadra Anderson      : 1940      MRN: [id number]  Encounter Provider: Kobi Noel DO  Encounter Date: 2025   Encounter department: [facility]  :  Assessment & Plan  Mild intermittent asthma without complication  Exhaled nitric oxide level was mildly elevated at 29 ppb today.  I did give her prescription for Arnuity 100 mcg 1 puff daily that she will use intermittently as needed.  She will use it for few days on her own and when she is doing well she can try stopping it.  Will use as needed basis.  I did prescribe ProAir inhaler for her that she can use 2 puffs 4 times a day as needed.  Xopenex no longer covered by her insurance.    Orders:    POCT FeNO    fluticasone (Arnuity Ellipta) 100 MCG/ACT AEPB inhaler; Inhale 1 puff daily Rinse mouth after use.    albuterol (ProAir HFA) 90 mcg/act inhaler; Inhale 2 puffs every 4 (four) hours as needed for wheezing    Seasonal allergic rhinitis due to pollen  Does have seasonal allergic rhinitis.  Is having some nasal congestion postnasal drainage now.  I did recommend she take over-the-counter Claritin.  She needed refill forr prescription Flonase nasal spray  Orders:    fluticasone (FLONASE) 50 mcg/act nasal spray; 1 spray into each nostril daily    Obstructive sleep apnea  Has a history of mild GEORGINA but did not want pursue CPAP therapy.  Not having any excessive daytime somnolence or nocturnal dyspnea           Return in about 6 months (around 2025).    History of Present Illness   Khadra Anderson is a 85 y.o. female who presents for follow-up of her mild intermittent asthma.  She also has mild seasonal allergic rhinitis.  She does have mild GEORGINA but did not want to pursue CPAP therapy in the past.  Not having excessive daytime somnolence.  Spirometry back in  showed normal lung volumes.  Khadra states she is having some allergy symptoms and does get some nasal congestionand  " postnasal drainage.  Also gets some intermittent wheeze.  She does use Breztri 2 puffs once a day periodically which does help her but she does not like the way it makes her feel.  Feels slightly jittery.  Has some mild chronic exertional shortness of breath but this is stable.    Review of Systems   Constitutional:  Negative for chills, fever and unexpected weight change.   HENT:  Positive for postnasal drip. Negative for congestion, rhinorrhea and sore throat.    Eyes:  Negative for discharge and redness.   Respiratory:          Some intermittent wheezing.  Does get some shortness of breath with exertion.   Cardiovascular:  Negative for chest pain, palpitations and leg swelling.   Gastrointestinal:  Negative for abdominal distention, abdominal pain and nausea.   Endocrine: Negative for polydipsia and polyphagia.   Genitourinary:  Negative for dysuria.   Musculoskeletal:  Negative for joint swelling and myalgias.   Skin:  Negative for rash.   Neurological:  Negative for light-headedness.   Psychiatric/Behavioral:  Negative for decreased concentration.        Aside from what is mentioned in the HPI, ROS is otherwise negative         Medical History Reviewed by provider this encounter:     .    Objective   /78 (BP Location: Left arm, Patient Position: Sitting, Cuff Size: Standard)   Pulse 55   Temp 97.8 °F (36.6 °C) (Temporal)   Resp 12   Ht 5' 3\" (1.6 m)   Wt 82.6 kg (182 lb)   SpO2 98%   BMI 32.24 kg/m²     Physical Exam  Vitals reviewed.   Constitutional:       General: She is not in acute distress.     Appearance: Normal appearance. She is well-developed.   HENT:      Head: Normocephalic.      Right Ear: External ear normal.      Left Ear: External ear normal.      Nose: Nose normal.      Mouth/Throat:      Mouth: Mucous membranes are moist.      Pharynx: Oropharynx is clear. No oropharyngeal exudate.     Eyes:      Conjunctiva/sclera: Conjunctivae normal.      Pupils: Pupils are equal, round, and " reactive to light.       Cardiovascular:      Rate and Rhythm: Normal rate and regular rhythm.      Heart sounds: Normal heart sounds.   Pulmonary:      Effort: Pulmonary effort is normal.      Comments: Lung sounds are clear.  No wheezes, crackles or rhonchi  Abdominal:      General: There is no distension.      Palpations: Abdomen is soft.      Tenderness: There is no abdominal tenderness.     Musculoskeletal:      Cervical back: Neck supple.      Comments: No edema, cyanosis or clubbing   Lymphadenopathy:      Cervical: No cervical adenopathy.     Skin:     General: Skin is warm and dry.     Neurological:      General: No focal deficit present.      Mental Status: She is alert and oriented to person, place, and time.     Psychiatric:         Mood and Affect: Mood normal.         Behavior: Behavior normal.         Thought Content: Thought content normal.       Other studies:  Exhaled nitric oxide level  Fe NO - 29 ppb  Minimally elevated suggesting some mild airway inflammation    Peak flow rate   500 l/m            Kobi Noel DO

## 2025-05-22 NOTE — ASSESSMENT & PLAN NOTE
Exhaled nitric oxide level was mildly elevated at 29 ppb today.  I did give her prescription for Arnuity 100 mcg 1 puff daily that she will use intermittently as needed.  She will use it for few days on her own and when she is doing well she can try stopping it.  Will use as needed basis.  I did prescribe ProAir inhaler for her that she can use 2 puffs 4 times a day as needed.  Xopenex no longer covered by her insurance.    Orders:    POCT FeNO    fluticasone (Arnuity Ellipta) 100 MCG/ACT AEPB inhaler; Inhale 1 puff daily Rinse mouth after use.    albuterol (ProAir HFA) 90 mcg/act inhaler; Inhale 2 puffs every 4 (four) hours as needed for wheezing

## 2025-05-22 NOTE — ASSESSMENT & PLAN NOTE
Does have seasonal allergic rhinitis.  Is having some nasal congestion postnasal drainage now.  I did recommend she take over-the-counter Claritin.  She needed refill forr prescription Flonase nasal spray  Orders:    fluticasone (FLONASE) 50 mcg/act nasal spray; 1 spray into each nostril daily

## 2025-05-22 NOTE — ASSESSMENT & PLAN NOTE
Has a history of mild GEORGINA but did not want pursue CPAP therapy.  Not having any excessive daytime somnolence or nocturnal dyspnea

## 2025-05-22 NOTE — PATIENT INSTRUCTIONS
Try Arnuity 100 mcg 1 puff daily and to you feel better from your asthma then stop    Call if any problems.

## 2025-06-20 NOTE — PROGRESS NOTES
Pre-charting for Appointment      Reason for being seen today: consult for uterovaginal prolapse    Any current testing/imaging done prior to exam today:

## 2025-06-23 ENCOUNTER — OFFICE VISIT (OUTPATIENT)
Dept: OBGYN CLINIC | Facility: CLINIC | Age: 85
End: 2025-06-23
Payer: MEDICARE

## 2025-06-23 VITALS
WEIGHT: 178.8 LBS | HEIGHT: 63 IN | BODY MASS INDEX: 31.68 KG/M2 | DIASTOLIC BLOOD PRESSURE: 64 MMHG | SYSTOLIC BLOOD PRESSURE: 130 MMHG

## 2025-06-23 DIAGNOSIS — M81.0 OSTEOPOROSIS, UNSPECIFIED OSTEOPOROSIS TYPE, UNSPECIFIED PATHOLOGICAL FRACTURE PRESENCE: Primary | ICD-10-CM

## 2025-06-23 DIAGNOSIS — B37.9 CANDIDIASIS: ICD-10-CM

## 2025-06-23 PROCEDURE — 99213 OFFICE O/P EST LOW 20 MIN: CPT | Performed by: OBSTETRICS & GYNECOLOGY

## 2025-06-23 RX ORDER — NYSTATIN AND TRIAMCINOLONE ACETONIDE 100000; 1 [USP'U]/G; MG/G
1 CREAM TOPICAL 2 TIMES DAILY
Qty: 60 G | Refills: 1 | Status: SHIPPED | OUTPATIENT
Start: 2025-06-23

## 2025-06-23 RX ORDER — ALENDRONATE SODIUM 70 MG/1
70 TABLET ORAL
Qty: 4 TABLET | Refills: 12 | Status: SHIPPED | OUTPATIENT
Start: 2025-06-23

## 2025-06-23 NOTE — PROGRESS NOTES
"Assessment/Plan:     There are no diagnoses linked to this encounter.        84-year-old female  History of diverticulitis  Post menopause  Chronic hypertension  History of tubal ligation  Cystocele/stress urinary incontinence pessary ring with support keep falling off  Osteoporosis  Plan   No Pap needed  Diet/exercise  Calcium/vitamin D  Kegel exercise recommended patient is having vaginal laxity cystocele and uterovaginal prolapse patient desires to hold off on physical therapy for now  DEXA scan results reviewed discussed with patient  Start Fosamax  Mammogram  Up-to-date with colonoscopy  Subjective:      Patient ID: Khadra Anderson is a 85 y.o. female.    HPI  85-year-old female present to the office today for pelvic exam secondary to cystocele/uterovaginal prolapse as well as discuss results of DEXA scan    Denies any urgency frequency or dysuria  Denies any pelvic pain  Patient is doing Kegel exercises on a regular basis and following up hygiene reviewed and discussed with her at the time of the prior visit    DEXA scan results reviewed and discussed with patient finding of osteoporosis reviewed discussed with patient I would recommend calcium/vitamin D weightbearing exercise avoid risk of fall as well as I would recommend medical management management management options reviewed and discussed with patient in details patient desired to try Fosamax risk-benefit side effect reviewed discussed with patient I would recommend repeat bone scan in 2 years    The following portions of the patient's history were reviewed and updated as appropriate: allergies, current medications, past family history, past medical history, past social history, past surgical history and problem list.    Review of Systems      Objective:      /64 (BP Location: Left arm, Patient Position: Sitting, Cuff Size: Adult)   Ht 5' 3\" (1.6 m)   Wt 81.1 kg (178 lb 12.8 oz)   BMI 31.67 kg/m²   LUMBAR SPINE  Level: L2-L3  (L1, L4 vertebrae " excluded from analysis due to local structural abnormalities or artifact):  BMD: 0.933 gm/cm2  T-score: -2.3        LEFT TOTAL HIP:  BMD: 0.864 gm/cm2  T-score: -1.1     LEFT FEMORAL NECK:  BMD: 0.756 gm/cm2  T score: -2.0     RIGHT TOTAL HIP:  BMD: 0.885 gm/cm2  T-score: -1.0     RIGHT FEMORAL NECK:  BMD: 0.745 gm/cm2  T score: -2.1     LEFT  FOREARM:  33% RADIUS BMD: 0.652 gm/cm2  T-score: -2.6        IMPRESSION:     1. Osteoporosis. Based on the left radius        2.  Since a DXA study from 10/18/2022, there has been:  A  STATISTICALLY SIGNIFICANT DECREASE in bone mineral density of 0.067 g/cm2 (6.7%) in the lumbar spine.  A  STATISTICALLY SIGNIFICANT DECREASE in bone mineral density of 0.029 g/cm2 (3.2)% in the hips.             Physical Exam  Constitutional:       Appearance: She is well-developed.   Abdominal:      General: There is no distension.      Palpations: Abdomen is soft.      Tenderness: There is no abdominal tenderness.   Genitourinary:     Labia:         Right: No rash, tenderness or lesion.         Left: No rash, tenderness or lesion.       Vagina: No signs of injury. Prolapsed vaginal walls present. No vaginal discharge, erythema or tenderness.      Cervix: No cervical motion tenderness, discharge or friability.      Adnexa:         Right: No mass, tenderness or fullness.          Left: No mass, tenderness or fullness.        Comments: Moderate cystocele noted  Second-degree uterovaginal prolapse  Desire expectant management    Neurological:      Mental Status: She is alert and oriented to person, place, and time.     Psychiatric:         Behavior: Behavior normal.

## 2025-08-04 ENCOUNTER — OFFICE VISIT (OUTPATIENT)
Dept: OTOLARYNGOLOGY | Facility: CLINIC | Age: 85
End: 2025-08-04
Payer: MEDICARE

## 2025-08-04 ENCOUNTER — OFFICE VISIT (OUTPATIENT)
Dept: AUDIOLOGY | Facility: CLINIC | Age: 85
End: 2025-08-04
Payer: MEDICARE

## 2025-08-04 VITALS
BODY MASS INDEX: 31.54 KG/M2 | WEIGHT: 178 LBS | DIASTOLIC BLOOD PRESSURE: 76 MMHG | TEMPERATURE: 98.1 F | HEART RATE: 72 BPM | OXYGEN SATURATION: 96 % | SYSTOLIC BLOOD PRESSURE: 122 MMHG | HEIGHT: 63 IN

## 2025-08-04 DIAGNOSIS — H61.21 RIGHT EAR IMPACTED CERUMEN: ICD-10-CM

## 2025-08-04 DIAGNOSIS — H90.3 SENSORINEURAL HEARING LOSS (SNHL), BILATERAL: ICD-10-CM

## 2025-08-04 DIAGNOSIS — H90.3 SENSORY HEARING LOSS, BILATERAL: Primary | ICD-10-CM

## 2025-08-04 DIAGNOSIS — H93.8X2 EAR FULLNESS, LEFT: Primary | ICD-10-CM

## 2025-08-04 PROCEDURE — 92557 COMPREHENSIVE HEARING TEST: CPT

## 2025-08-04 PROCEDURE — 92567 TYMPANOMETRY: CPT

## 2025-08-04 PROCEDURE — 99204 OFFICE O/P NEW MOD 45 MIN: CPT | Performed by: NURSE PRACTITIONER

## 2025-08-04 PROCEDURE — 69210 REMOVE IMPACTED EAR WAX UNI: CPT | Performed by: NURSE PRACTITIONER

## (undated) DEVICE — PREMIUM DRY TRAY LF: Brand: MEDLINE INDUSTRIES, INC.

## (undated) DEVICE — GLOVE INDICATOR PI UNDERGLOVE SZ 6.5 BLUE

## (undated) DEVICE — LIGHT HANDLE COVER SLEEVE DISP BLUE STELLAR

## (undated) DEVICE — CHLORHEXIDINE 4PCT 4 OZ

## (undated) DEVICE — GLOVE PI ULTRA TOUCH SZ.6.5

## (undated) DEVICE — TISSUE REMOVAL SYSTEM FLUID MANAGEMENT ACCESSORIES: Brand: SYMPHION

## (undated) DEVICE — STRL ALLENTOWN HYSTEROSCOPY PK: Brand: CARDINAL HEALTH

## (undated) DEVICE — 3M™ STERI-DRAPE™ UNDER BUTTOCKS DRAPE WITH POUCH 1084: Brand: STERI-DRAPE™